# Patient Record
Sex: FEMALE | Race: WHITE | NOT HISPANIC OR LATINO | Employment: OTHER | URBAN - NONMETROPOLITAN AREA
[De-identification: names, ages, dates, MRNs, and addresses within clinical notes are randomized per-mention and may not be internally consistent; named-entity substitution may affect disease eponyms.]

---

## 2020-12-02 RX ORDER — LEVOTHYROXINE AND LIOTHYRONINE 9.5; 2.25 UG/1; UG/1
60 TABLET ORAL DAILY
COMMUNITY
End: 2021-04-06 | Stop reason: ALTCHOICE

## 2020-12-02 RX ORDER — FLUCONAZOLE 150 MG/1
150 TABLET ORAL ONCE
COMMUNITY
End: 2020-12-14

## 2020-12-02 RX ORDER — PREDNISONE 10 MG/1
10 TABLET ORAL TAKE AS DIRECTED
COMMUNITY
End: 2020-12-14

## 2020-12-02 RX ORDER — LEVOTHYROXINE AND LIOTHYRONINE 38; 9 UG/1; UG/1
60 TABLET ORAL DAILY
COMMUNITY

## 2020-12-02 RX ORDER — AMOXICILLIN 500 MG/1
1000 CAPSULE ORAL 3 TIMES DAILY
COMMUNITY
End: 2020-12-14

## 2020-12-02 RX ORDER — ESTRADIOL 0.5 MG/1
0.5 TABLET ORAL DAILY
COMMUNITY

## 2020-12-02 RX ORDER — CITALOPRAM 20 MG/1
20 TABLET ORAL DAILY
COMMUNITY
End: 2020-12-14

## 2020-12-02 RX ORDER — TRAZODONE HYDROCHLORIDE 100 MG/1
200 TABLET ORAL NIGHTLY
COMMUNITY
End: 2022-03-31

## 2020-12-02 RX ORDER — LORATADINE 10 MG/1
10 TABLET ORAL DAILY
COMMUNITY

## 2020-12-02 RX ORDER — NAPROXEN 500 MG/1
500 TABLET ORAL 2 TIMES DAILY WITH MEALS
COMMUNITY
End: 2020-12-14

## 2020-12-02 RX ORDER — BENZONATATE 100 MG/1
100 CAPSULE ORAL 3 TIMES DAILY PRN
COMMUNITY
End: 2020-12-14

## 2020-12-02 RX ORDER — ESOMEPRAZOLE MAGNESIUM 40 MG/1
40 CAPSULE, DELAYED RELEASE ORAL DAILY
COMMUNITY
End: 2020-12-14

## 2020-12-02 RX ORDER — CEPHALEXIN 500 MG/1
500 CAPSULE ORAL 3 TIMES DAILY
COMMUNITY
End: 2020-12-14

## 2020-12-02 RX ORDER — LOSARTAN POTASSIUM 100 MG/1
100 TABLET ORAL DAILY
COMMUNITY
End: 2020-12-14

## 2020-12-02 RX ORDER — FERROUS SULFATE 325(65) MG
325 TABLET ORAL
COMMUNITY
End: 2020-12-14

## 2020-12-02 RX ORDER — AZITHROMYCIN 250 MG/1
250 TABLET, FILM COATED ORAL TAKE AS DIRECTED
COMMUNITY
End: 2020-12-14

## 2020-12-02 RX ORDER — GLIPIZIDE 5 MG/1
5 TABLET, FILM COATED, EXTENDED RELEASE ORAL DAILY
COMMUNITY
End: 2020-12-14

## 2020-12-02 RX ORDER — GABAPENTIN 400 MG/1
400 CAPSULE ORAL 4 TIMES DAILY
COMMUNITY
End: 2023-01-13 | Stop reason: HOSPADM

## 2020-12-02 RX ORDER — VALACYCLOVIR HYDROCHLORIDE 1 G/1
1000 TABLET, FILM COATED ORAL 3 TIMES DAILY
COMMUNITY
End: 2020-12-14

## 2020-12-02 RX ORDER — MECLIZINE HYDROCHLORIDE 25 MG/1
TABLET ORAL
COMMUNITY
End: 2020-12-14

## 2020-12-02 RX ORDER — HYDROCHLOROTHIAZIDE 25 MG/1
25 TABLET ORAL DAILY
COMMUNITY

## 2020-12-02 RX ORDER — DOXYCYCLINE 100 MG/1
100 TABLET ORAL 2 TIMES DAILY
COMMUNITY
End: 2020-12-14

## 2020-12-14 ENCOUNTER — TELEPHONE (OUTPATIENT)
Dept: GASTROENTEROLOGY | Facility: CLINIC | Age: 70
End: 2020-12-14

## 2020-12-14 ENCOUNTER — RESULTS ENCOUNTER (OUTPATIENT)
Dept: GASTROENTEROLOGY | Facility: CLINIC | Age: 70
End: 2020-12-14

## 2020-12-14 ENCOUNTER — LAB (OUTPATIENT)
Dept: LAB | Facility: HOSPITAL | Age: 70
End: 2020-12-14

## 2020-12-14 ENCOUNTER — OFFICE VISIT (OUTPATIENT)
Dept: GASTROENTEROLOGY | Facility: CLINIC | Age: 70
End: 2020-12-14

## 2020-12-14 VITALS
DIASTOLIC BLOOD PRESSURE: 88 MMHG | RESPIRATION RATE: 18 BRPM | HEART RATE: 81 BPM | BODY MASS INDEX: 36.37 KG/M2 | WEIGHT: 213 LBS | OXYGEN SATURATION: 98 % | SYSTOLIC BLOOD PRESSURE: 130 MMHG | TEMPERATURE: 96.8 F | HEIGHT: 64 IN

## 2020-12-14 DIAGNOSIS — R19.7 DIARRHEA, UNSPECIFIED TYPE: Primary | ICD-10-CM

## 2020-12-14 DIAGNOSIS — R10.30 LOWER ABDOMINAL PAIN: Chronic | ICD-10-CM

## 2020-12-14 DIAGNOSIS — R13.14 PHARYNGOESOPHAGEAL DYSPHAGIA: Chronic | ICD-10-CM

## 2020-12-14 DIAGNOSIS — K21.9 GASTROESOPHAGEAL REFLUX DISEASE, UNSPECIFIED WHETHER ESOPHAGITIS PRESENT: Chronic | ICD-10-CM

## 2020-12-14 DIAGNOSIS — Z01.812 PRE-PROCEDURE LAB EXAM: Primary | ICD-10-CM

## 2020-12-14 DIAGNOSIS — R19.7 DIARRHEA, UNSPECIFIED TYPE: Chronic | ICD-10-CM

## 2020-12-14 DIAGNOSIS — R19.7 DIARRHEA, UNSPECIFIED TYPE: Primary | Chronic | ICD-10-CM

## 2020-12-14 DIAGNOSIS — Z86.010 PERSONAL HISTORY OF COLONIC POLYPS: Chronic | ICD-10-CM

## 2020-12-14 DIAGNOSIS — R63.4 WEIGHT LOSS: Chronic | ICD-10-CM

## 2020-12-14 PROBLEM — Z86.0100 PERSONAL HISTORY OF COLONIC POLYPS: Chronic | Status: ACTIVE | Noted: 2020-12-14

## 2020-12-14 PROBLEM — G47.33 OBSTRUCTIVE APNEA: Status: ACTIVE | Noted: 2020-12-14

## 2020-12-14 PROBLEM — E11.42 DIABETIC PERIPHERAL NEUROPATHY ASSOCIATED WITH TYPE 2 DIABETES MELLITUS (HCC): Status: ACTIVE | Noted: 2020-12-14

## 2020-12-14 PROBLEM — R41.3 AMNESIA: Status: ACTIVE | Noted: 2020-12-14

## 2020-12-14 LAB
ALBUMIN SERPL-MCNC: 4.6 G/DL (ref 3.5–5.2)
ALBUMIN/GLOB SERPL: 1.6 G/DL
ALP SERPL-CCNC: 54 U/L (ref 39–117)
ALT SERPL W P-5'-P-CCNC: 16 U/L (ref 1–33)
ANION GAP SERPL CALCULATED.3IONS-SCNC: 10 MMOL/L (ref 5–15)
AST SERPL-CCNC: 21 U/L (ref 1–32)
BILIRUB SERPL-MCNC: 0.2 MG/DL (ref 0–1.2)
BUN SERPL-MCNC: 12 MG/DL (ref 8–23)
BUN/CREAT SERPL: 14.6 (ref 7–25)
CALCIUM SPEC-SCNC: 10 MG/DL (ref 8.6–10.5)
CHLORIDE SERPL-SCNC: 98 MMOL/L (ref 98–107)
CO2 SERPL-SCNC: 30 MMOL/L (ref 22–29)
CREAT SERPL-MCNC: 0.82 MG/DL (ref 0.57–1)
DEPRECATED RDW RBC AUTO: 38.8 FL (ref 37–54)
ERYTHROCYTE [DISTWIDTH] IN BLOOD BY AUTOMATED COUNT: 12.5 % (ref 12.3–15.4)
GFR SERPL CREATININE-BSD FRML MDRD: 69 ML/MIN/1.73
GLOBULIN UR ELPH-MCNC: 2.8 GM/DL
GLUCOSE SERPL-MCNC: 107 MG/DL (ref 65–99)
HCT VFR BLD AUTO: 35.4 % (ref 34–46.6)
HGB BLD-MCNC: 12 G/DL (ref 12–15.9)
IGA1 MFR SER: 49 MG/DL (ref 70–400)
MCH RBC QN AUTO: 29.2 PG (ref 26.6–33)
MCHC RBC AUTO-ENTMCNC: 33.9 G/DL (ref 31.5–35.7)
MCV RBC AUTO: 86.1 FL (ref 79–97)
PLATELET # BLD AUTO: 338 10*3/MM3 (ref 140–450)
PMV BLD AUTO: 9.2 FL (ref 6–12)
POTASSIUM SERPL-SCNC: 4.1 MMOL/L (ref 3.5–5.2)
PROT SERPL-MCNC: 7.4 G/DL (ref 6–8.5)
RBC # BLD AUTO: 4.11 10*6/MM3 (ref 3.77–5.28)
SODIUM SERPL-SCNC: 138 MMOL/L (ref 136–145)
WBC # BLD AUTO: 5.62 10*3/MM3 (ref 3.4–10.8)

## 2020-12-14 PROCEDURE — 99204 OFFICE O/P NEW MOD 45 MIN: CPT | Performed by: NURSE PRACTITIONER

## 2020-12-14 PROCEDURE — 82784 ASSAY IGA/IGD/IGG/IGM EACH: CPT

## 2020-12-14 PROCEDURE — 36415 COLL VENOUS BLD VENIPUNCTURE: CPT

## 2020-12-14 PROCEDURE — 83516 IMMUNOASSAY NONANTIBODY: CPT

## 2020-12-14 PROCEDURE — 85027 COMPLETE CBC AUTOMATED: CPT

## 2020-12-14 PROCEDURE — 80053 COMPREHEN METABOLIC PANEL: CPT

## 2020-12-14 RX ORDER — DICYCLOMINE HCL 20 MG
20 TABLET ORAL 3 TIMES DAILY PRN
Qty: 30 TABLET | Refills: 1 | Status: SHIPPED | OUTPATIENT
Start: 2020-12-14 | End: 2021-04-06

## 2020-12-14 RX ORDER — SODIUM, POTASSIUM,MAG SULFATES 17.5-3.13G
SOLUTION, RECONSTITUTED, ORAL ORAL
Qty: 2 BOTTLE | Refills: 0 | Status: SHIPPED | OUTPATIENT
Start: 2020-12-14 | End: 2021-01-15 | Stop reason: HOSPADM

## 2020-12-14 RX ORDER — SODIUM CHLORIDE 9 MG/ML
70 INJECTION, SOLUTION INTRAVENOUS CONTINUOUS PRN
Status: CANCELLED | OUTPATIENT
Start: 2021-01-15

## 2020-12-14 RX ORDER — FAMOTIDINE 20 MG/1
20 TABLET, FILM COATED ORAL 2 TIMES DAILY
Qty: 60 TABLET | Refills: 5 | Status: SHIPPED | OUTPATIENT
Start: 2020-12-14 | End: 2022-03-31 | Stop reason: ALTCHOICE

## 2020-12-14 RX ORDER — FAMOTIDINE 20 MG/1
20 TABLET, FILM COATED ORAL NIGHTLY PRN
COMMUNITY
End: 2020-12-14 | Stop reason: DRUGHIGH

## 2020-12-14 RX ORDER — OMEPRAZOLE 20 MG/1
20 TABLET, DELAYED RELEASE ORAL DAILY
COMMUNITY
End: 2020-12-14 | Stop reason: ALTCHOICE

## 2020-12-14 NOTE — PATIENT INSTRUCTIONS
1. Antireflux measures: Avoid fried, fatty foods, alcohol, chocolate, coffee, tea,  soft drinks, peppermint and spearmint, spicy foods, tomatoes and tomato based foods, onion based foods, and smoking. Other antireflux measures include weight reduction if overweight, avoiding tight clothing around the abdomen, elevating the head of the bed 6 inches with blocks under the head board, and don't drink or eat before going to bed and avoid lying down immediately after meals.  2. Pepcid 20 mg 1 po twice a day.  3. Dietary instructions.  The patient should eat relatively soft diet, and should eat in upright position and chew well.  The patient should drink water after 2-3 bites and take medications with liberal amounts of water. Furthermore after eating and taking medications, the patient should remain in upright position for 5-10 minutes.  4. Labs  5. Stool studies  6. Low FODMAP diet  7. Bentyl 20 mg 1 po 3 times per day as needed for lower abdominal pain and diarrhea.   7. Possible EGD in the future if no improvement in swallowing.  8. Colonoscopy: Description of the procedure, risks, benefits, alternatives and options, including nonoperative options, were discussed with the patient in detail. The patient understands and wishes to proceed.  9. COVID-19 testing prior to procedure. The patient will need to self-quarantine after testing until the procedure. Instructions given to patient.

## 2020-12-14 NOTE — TELEPHONE ENCOUNTER
Patient called today. She states that the Suprep was going to be $47 and she can't afford that. She would like something different called in.

## 2020-12-14 NOTE — PROGRESS NOTES
"     New Patient Consult      Date: 2020   Patient Name: Harley Rodriges  MRN: [id number]  : 1950     Primary Care Provider: Bam German PA    Chief Complaint   Patient presents with   • Diarrhea     History of Present Illness: Harley Rodriges is a 70 y.o. female who is here today to establish care with Gastroenterology for diarrhea.     The patient has a history of diarrhea for many yers.  Since 2020, diarrhea worsened. She has 3-5 loose to watery bowel movements per day. The patient has not traveled, taken antibiotics or been around anyone ill. No rectal bleeding. She has lower abdominal pain associated with bowel movements. No history of nausea or vomiting.     She has lost about 12 pounds in the past 2 months unintentionally. She has changed her eating habits due to diarrhea. No loss of appetite. She has a history of heartburn that is well controlled with Pepcid at night. She has occasional difficulty swallowing solids and liquids. It feels food and drinks will become \"stuck\" in the back of her throat that do not want to go down about once per month.      The patient's last colonoscopy and EGD were in 2016 by Dr. Cason in [place], [place]. She had a few polyps removed, pathology unknown. There is no family history of GI malignancy or IBD.     Subjective      Past Medical History:   Diagnosis Date   • Colon polyp    • Diabetes mellitus (CMS/HCC)    • Diabetic polyneuropathy (CMS/HCC)    • Disease of thyroid gland    • Esophagitis    • Gastritis    • History of colon polyps    • Hyperlipidemia    • Hypertension    • Iron deficiency      Past Surgical History:   Procedure Laterality Date   • COLONOSCOPY  2016   • COLONOSCOPY W/ BIOPSIES  2016    Dr. Cason   • ENDOSCOPY  2016    Dr. Cason   • UPPER GASTROINTESTINAL ENDOSCOPY  2016     Family History   Problem Relation Age of Onset   • Heart disease Mother    • Skin cancer Mother    • Heart disease Father    • Arthritis Father    • " Polymyalgia rheumatica Father    • Skin cancer Father    • Colon cancer Neg Hx    • Inflammatory bowel disease Neg Hx      Social History     Socioeconomic History   • Marital status:      Spouse name: Not on file   • Number of children: Not on file   • Years of education: Not on file   • Highest education level: Not on file   Tobacco Use   • Smoking status: Never Smoker   • Smokeless tobacco: Never Used   Substance and Sexual Activity   • Alcohol use: Never     Frequency: Never   • Drug use: Never   • Sexual activity: Defer       Current Outpatient Medications:   •  estradiol (ESTRACE) 0.5 MG tablet, Take 0.5 mg by mouth Daily., Disp: , Rfl:   •  GABAPENTIN PO, Take 400 mg by mouth., Disp: , Rfl:   •  hydroCHLOROthiazide (HYDRODIURIL) 25 MG tablet, Take 25 mg by mouth Daily., Disp: , Rfl:   •  loratadine (CLARITIN) 10 MG tablet, Take 10 mg by mouth Daily., Disp: , Rfl:   •  metFORMIN (GLUCOPHAGE) 1000 MG tablet, Take 1,000 mg by mouth 2 (Two) Times a Day With Meals., Disp: , Rfl:   •  thyroid (ARMOUR) 15 MG tablet, Take 15 mg by mouth Daily. Total 60 mg per day, Disp: , Rfl:   •  Thyroid 60 MG PO tablet, Take 60 mg by mouth Daily. Total 75 mg per day, Disp: , Rfl:   •  traZODone (DESYREL) 100 MG tablet, Take 200 mg by mouth Every Night., Disp: , Rfl:   •  dicyclomine (Bentyl) 20 MG tablet, Take 1 tablet by mouth 3 (Three) Times a Day As Needed (lower abdominal pain and diarrhea)., Disp: 30 tablet, Rfl: 1  •  famotidine (PEPCID) 20 MG tablet, Take 1 tablet by mouth 2 (Two) Times a Day., Disp: 60 tablet, Rfl: 5  •  sodium-potassium-magnesium sulfates (Suprep Bowel Prep Kit) 17.5-3.13-1.6 GM/177ML solution oral solution, Use as directed for colonoscopy prep. Patient has instructions., Disp: 2 bottle, Rfl: 0    Allergies   Allergen Reactions   • Prednisone Irritability        Review of Systems   Constitutional: Positive for unexpected weight loss. Negative for appetite change.   HENT: Negative for trouble  swallowing.    Eyes: Negative for blurred vision.   Respiratory: Negative for choking and chest tightness.    Cardiovascular: Negative for leg swelling.   Gastrointestinal: Positive for abdominal pain, diarrhea and GERD. Negative for abdominal distention, anal bleeding, blood in stool, constipation, nausea, rectal pain, vomiting and indigestion.   Endocrine: Negative for polyphagia.   Genitourinary: Negative for hematuria.   Musculoskeletal: Negative for arthralgias and myalgias.   Skin: Negative for rash.   Allergic/Immunologic: Negative for food allergies.   Neurological: Negative for dizziness, syncope and confusion.   Hematological: Does not bruise/bleed easily.   Psychiatric/Behavioral: Negative for depressed mood.      The following portions of the patient's history were reviewed and updated as appropriate: allergies, current medications, past family history, past medical history, past social history, past surgical history and problem list.    Objective     Physical Exam  Vitals signs and nursing note reviewed.   Constitutional:       General: She is awake. She is not in acute distress.     Appearance: Normal appearance. She is well-developed. She is not diaphoretic.   HENT:      Head: Normocephalic and atraumatic.      Right Ear: Hearing and external ear normal.      Left Ear: Hearing and external ear normal.      Nose: Nose normal.      Mouth/Throat:      Lips: Pink.      Mouth: Mucous membranes are not pale, not dry and not cyanotic. No oral lesions.      Pharynx: No oropharyngeal exudate.   Eyes:      General: Lids are normal.         Right eye: No discharge.         Left eye: No discharge.      Conjunctiva/sclera: Conjunctivae normal.   Neck:      Musculoskeletal: Neck supple. No edema.      Thyroid: No thyroid mass or thyromegaly.      Vascular: No JVD.      Trachea: Trachea normal.   Cardiovascular:      Rate and Rhythm: Normal rate and regular rhythm.      Heart sounds: Normal heart sounds and S2  "normal. No murmur. No friction rub. No gallop. No S3 sounds.    Pulmonary:      Effort: Pulmonary effort is normal. No respiratory distress.      Breath sounds: Normal breath sounds.   Chest:      Chest wall: No tenderness.   Abdominal:      General: Bowel sounds are normal. There is no distension.      Palpations: Abdomen is not rigid. There is no hepatomegaly, splenomegaly or mass.      Tenderness: There is no abdominal tenderness. There is no guarding or rebound.      Hernia: No hernia is present.   Musculoskeletal: Normal range of motion.   Lymphadenopathy:      Cervical: No cervical adenopathy.      Upper Body:      Left upper body: No supraclavicular adenopathy.   Skin:     General: Skin is warm and dry.      Coloration: Skin is not pale.      Findings: No rash.      Nails: There is no clubbing.     Neurological:      Mental Status: She is alert and oriented to person, place, and time.      Cranial Nerves: No cranial nerve deficit.      Sensory: No sensory deficit.   Psychiatric:         Mood and Affect: Mood normal.         Speech: Speech normal.         Behavior: Behavior is cooperative.       Vital Signs:   Vitals:    12/14/20 0820   BP: 130/88   Pulse: 81   Resp: 18   Temp: 96.8 °F (36 °C)   TempSrc: Temporal   SpO2: 98%   Weight: 96.6 kg (213 lb)   Height: 162.6 cm (64\")     Results Review:   I have reviewed the patient's new clinical and imaging results.    No visits with results within 90 Day(s) from this visit.   Latest known visit with results is:   No results found for any previous visit.      No radiology results for the last 90 days.     Dated 10/13/2020 albumin 4.8 total bilirubin 0.2 alkaline phosphatase 54 AST 15 ALT 13 hemoglobin 12.7 hematocrit 40.1 platelet count 314 lipase 31, TSH 1.1, magnesium 1.36, SARS COV2 total antibody: negative    Assessment / Plan      1. Diarrhea, unspecified type  2. Lower abdominal pain  3. Weight loss  Longstanding history of diarrhea with lower abdominal pain " that has gradually worsened since October 2020.  The patient has 3-5 loose to watery bowel movements per day.  She has not traveled, taken antibiotics or been around anyone ill.  No history of rectal bleeding.  She has lost about 12 pounds in the past 2 months unintentionally.  Most recent labs with TSH normal.  Basic labs, celiac panel  Stool studies  Colonoscopy to rule out IBD.    - CBC (No Diff); Future  - Comprehensive Metabolic Panel; Future  - IgA; Future  - Tissue Transglutaminase, IgA; Future  - Gastrointestinal Panel, PCR - Stool, Per Rectum; Future  - Clostridium Difficile Toxin - Stool, Per Rectum; Future  - Calprotectin, Fecal - Stool, Per Rectum; Future  - Case Request; Standing  - Implement Anesthesia Orders Day of Procedure; Standing  - Obtain Informed Consent; Standing  - Verify Bowel Prep Was Successful; Standing  - Oxygen Therapy- Nasal Cannula; 2 LPM; Titrate for SPO2: equal to or greater than, 90%; Standing  - POC Glucose Once; Standing  - sodium chloride 0.9 % infusion  - Case Request  - dicyclomine (Bentyl) 20 MG tablet; Take 1 tablet by mouth 3 (Three) Times a Day As Needed (lower abdominal pain and diarrhea).  Dispense: 30 tablet; Refill: 1  - sodium-potassium-magnesium sulfates (Suprep Bowel Prep Kit) 17.5-3.13-1.6 GM/177ML solution oral solution; Use as directed for colonoscopy prep. Patient has instructions.  Dispense: 2 bottle; Refill: 0    4. Personal history of colonic polyps  Last colonoscopy was in 2016, pathology unknown.   Colonoscopy for surveillance.    5. Gastroesophageal reflux disease, unspecified whether esophagitis present  Longstanding history of reflux, reasonable control with Pepcid.  Anti-reflux measures.  Pepcid 20 mg twice a day.    - famotidine (PEPCID) 20 MG tablet; Take 1 tablet by mouth 2 (Two) Times a Day.  Dispense: 60 tablet; Refill: 5    6. Pharyngoesophageal dysphagia  Long standing history of difficulty swallowing solids and liquids that occurs about once per  month. Symptoms are not progressive. EGD in 2016 normal per patient.  Will monitor for now. EGD in the future if worsening of symptoms.    Patient Instructions   1. Antireflux measures: Avoid fried, fatty foods, alcohol, chocolate, coffee, tea,  soft drinks, peppermint and spearmint, spicy foods, tomatoes and tomato based foods, onion based foods, and smoking. Other antireflux measures include weight reduction if overweight, avoiding tight clothing around the abdomen, elevating the head of the bed 6 inches with blocks under the head board, and don't drink or eat before going to bed and avoid lying down immediately after meals.  2. Pepcid 20 mg 1 po twice a day.  3. Dietary instructions.  The patient should eat relatively soft diet, and should eat in upright position and chew well.  The patient should drink water after 2-3 bites and take medications with liberal amounts of water. Furthermore after eating and taking medications, the patient should remain in upright position for 5-10 minutes.  4. Labs  5. Stool studies  6. Low FODMAP diet  7. Bentyl 20 mg 1 po 3 times per day as needed for lower abdominal pain and diarrhea.   7. Possible EGD in the future if no improvement in swallowing.  8. Colonoscopy: Description of the procedure, risks, benefits, alternatives and options, including nonoperative options, were discussed with the patient in detail. The patient understands and wishes to proceed.  9. COVID-19 testing prior to procedure. The patient will need to self-quarantine after testing until the procedure. Instructions given to patient.     Tito Migule, APRN  12/14/2020    Please note that portions of this note may have been completed with a voice recognition program. Efforts were made to edit the dictations, but occasionally words are mistranscribed.

## 2020-12-14 NOTE — PROGRESS NOTES
New Patient Consult      Date: 2020   Patient Name: Harley Rodriges  MRN: [id number]  : 1950     Referring Physician: Bam German PA    Chief Complaint   Patient presents with   • Diarrhea     History of Present Illness: Harley Rodriges is a 70 y.o. female who is here today to establish care with Gastroenterology for diarrhea.             Subjective      Past Medical History:   Diagnosis Date   • Diabetes mellitus (CMS/HCC)    • Diabetic polyneuropathy (CMS/HCC)    • Disease of thyroid gland    • Esophagitis    • Gastritis    • History of colon polyps    • Hyperlipidemia    • Hypertension    • Iron deficiency      Past Surgical History:   Procedure Laterality Date   • COLONOSCOPY W/ BIOPSIES      Dr. Cason   • ENDOSCOPY      Dr. Cason     Family History   Problem Relation Age of Onset   • Heart disease Mother    • Skin cancer Mother    • Heart disease Father    • Arthritis Father    • Polymyalgia rheumatica Father    • Skin cancer Father    • Colon cancer Neg Hx      Social History     Socioeconomic History   • Marital status:      Spouse name: Not on file   • Number of children: Not on file   • Years of education: Not on file   • Highest education level: Not on file   Tobacco Use   • Smoking status: Never Smoker   • Smokeless tobacco: Never Used   Substance and Sexual Activity   • Alcohol use: Never     Frequency: Never   • Drug use: Never   • Sexual activity: Defer       Current Outpatient Medications:   •  estradiol (ESTRACE) 0.5 MG tablet, Take 0.5 mg by mouth Daily., Disp: , Rfl:   •  GABAPENTIN PO, Take 400 mg by mouth., Disp: , Rfl:   •  hydroCHLOROthiazide (HYDRODIURIL) 25 MG tablet, Take 25 mg by mouth Daily., Disp: , Rfl:   •  loratadine (CLARITIN) 10 MG tablet, Take 10 mg by mouth Daily., Disp: , Rfl:   •  metFORMIN (GLUCOPHAGE) 1000 MG tablet, Take 1,000 mg by mouth 2 (Two) Times a Day With Meals., Disp: , Rfl:   •  omeprazole OTC (Acid Reducer) 20 MG EC tablet,  "Take 20 mg by mouth Daily., Disp: , Rfl:   •  thyroid (ARMOUR) 15 MG tablet, Take 15 mg by mouth Daily. Total 60 mg per day, Disp: , Rfl:   •  Thyroid 60 MG PO tablet, Take 60 mg by mouth Daily. Total 75 mg per day, Disp: , Rfl:   •  traZODone (DESYREL) 100 MG tablet, Take 200 mg by mouth Every Night., Disp: , Rfl:     No Known Allergies    Review of Systems:   Review of Systems   Constitutional: Positive for unexpected weight loss. Negative for appetite change.   HENT: Positive for trouble swallowing.    Eyes: Negative for blurred vision.   Respiratory: Negative for choking and chest tightness.    Cardiovascular: Negative for leg swelling.   Gastrointestinal: Positive for diarrhea and GERD. Negative for abdominal distention, abdominal pain, anal bleeding, blood in stool, constipation, nausea, rectal pain, vomiting and indigestion.   Endocrine: Negative for polyphagia.   Genitourinary: Negative for hematuria.   Musculoskeletal: Negative for arthralgias and myalgias.   Skin: Negative for rash.   Allergic/Immunologic: Negative for food allergies.   Neurological: Negative for dizziness, syncope and confusion.   Hematological: Does not bruise/bleed easily.   Psychiatric/Behavioral: Negative for depressed mood.     The following portions of the patient's history were reviewed and updated as appropriate: allergies, current medications, past family history, past medical history, past social history, past surgical history and problem list.    Objective     Physical Exam:  Vital Signs:   Vitals:    12/14/20 0820   BP: 130/88   Pulse: 81   Resp: 18   Temp: 96.8 °F (36 °C)   TempSrc: Temporal   SpO2: 98%   Weight: 96.6 kg (213 lb)   Height: 162.6 cm (64\")     Physical Exam  Vitals signs and nursing note reviewed.   Constitutional:       General: She is awake. She is not in acute distress.     Appearance: Normal appearance. She is well-developed. She is not diaphoretic.   HENT:      Head: Normocephalic and atraumatic.      " Right Ear: Hearing and external ear normal.      Left Ear: Hearing and external ear normal.      Nose: Nose normal.      Mouth/Throat:      Lips: Pink.      Mouth: Mucous membranes are not pale, not dry and not cyanotic. No oral lesions.      Pharynx: No oropharyngeal exudate.   Eyes:      General: Lids are normal.         Right eye: No discharge.         Left eye: No discharge.      Conjunctiva/sclera: Conjunctivae normal.   Neck:      Musculoskeletal: Neck supple. No edema.      Thyroid: No thyroid mass or thyromegaly.      Vascular: No JVD.      Trachea: Trachea normal.   Cardiovascular:      Rate and Rhythm: Normal rate and regular rhythm.      Heart sounds: Normal heart sounds and S2 normal. No murmur. No friction rub. No gallop. No S3 sounds.    Pulmonary:      Effort: Pulmonary effort is normal. No respiratory distress.      Breath sounds: Normal breath sounds.   Chest:      Chest wall: No tenderness.   Abdominal:      General: Bowel sounds are normal. There is no distension.      Palpations: Abdomen is not rigid. There is no hepatomegaly, splenomegaly or mass.      Tenderness: There is no abdominal tenderness. There is no guarding or rebound.      Hernia: No hernia is present.   Musculoskeletal: Normal range of motion.   Lymphadenopathy:      Cervical: No cervical adenopathy.      Upper Body:      Left upper body: No supraclavicular adenopathy.   Skin:     General: Skin is warm and dry.      Coloration: Skin is not pale.      Findings: No rash.      Nails: There is no clubbing.     Neurological:      Mental Status: She is alert and oriented to person, place, and time.      Cranial Nerves: No cranial nerve deficit.      Sensory: No sensory deficit.   Psychiatric:         Mood and Affect: Mood normal.         Speech: Speech normal.         Behavior: Behavior is cooperative.       Results Review:   I have reviewed the patient's new clinical and imaging results and agree with the interpretation.     No visits  with results within 90 Day(s) from this visit.   Latest known visit with results is:   No results found for any previous visit.      No radiology results for the last 90 days.    Dated 10/13/2020 albumin 4.8 total bilirubin 0.2 alkaline phosphatase 54 AST 15 ALT 13 hemoglobin 12.7 hematocrit 40.1 platelet count 314 lipase 31, TSH 1.1, magnesium 1.36, SARS COV2 total antibody: negative    Assessment / Plan      Assessment & Plan:  1. Diarrhea, unspecified type  ***    2. Weight loss  ***    3. Gastroesophageal reflux disease, unspecified whether esophagitis present  ***          Yuridia Vinson MD  [facility]  12/14/2020  08:36 EST    Please note that portions of this note may have been completed with a voice recognition program.

## 2020-12-15 LAB — TTG IGA SER-ACNC: <2 U/ML (ref 0–3)

## 2020-12-15 RX ORDER — POLYETHYLENE GLYCOL 1450
POWDER (GRAM) MISCELLANEOUS
Qty: 238 G | Refills: 0 | Status: SHIPPED | OUTPATIENT
Start: 2020-12-15 | End: 2021-01-15 | Stop reason: HOSPADM

## 2020-12-15 RX ORDER — BISACODYL 5 MG/1
TABLET, DELAYED RELEASE ORAL
Qty: 4 TABLET | Refills: 0 | Status: SHIPPED | OUTPATIENT
Start: 2020-12-15 | End: 2021-01-15 | Stop reason: HOSPADM

## 2021-01-04 LAB
ADV 40+41 DNA STL QL NAA+NON-PROBE: NOT DETECTED
ASTRO TYP 1-8 RNA STL QL NAA+NON-PROBE: NOT DETECTED
C CAYETANENSIS DNA STL QL NAA+NON-PROBE: NOT DETECTED
CAMPY SP DNA.DIARRHEA STL QL NAA+PROBE: NOT DETECTED
CRYPTOSP STL CULT: NOT DETECTED
E HISTOLYT AG STL-ACNC: NOT DETECTED
EAEC PAA PLAS AGGR+AATA ST NAA+NON-PRB: NOT DETECTED
EC STX1 + STX2 GENES STL NAA+PROBE: NOT DETECTED
EPEC EAE GENE STL QL NAA+NON-PROBE: NOT DETECTED
ETEC LTA+ST1A+ST1B TOX ST NAA+NON-PROBE: NOT DETECTED
G LAMBLIA DNA SPEC QL NAA+PROBE: NOT DETECTED
NOROVIRUS GI+II RNA STL QL NAA+NON-PROBE: NOT DETECTED
P SHIGELLOIDES DNA STL QL NAA+PROBE: NOT DETECTED
RV RNA STL NAA+PROBE: NOT DETECTED
SALMONELLA DNA SPEC QL NAA+PROBE: NOT DETECTED
SAPO I+II+IV+V RNA STL QL NAA+NON-PROBE: NOT DETECTED
SHIGELLA SP+EIEC IPAH STL QL NAA+PROBE: NOT DETECTED
V CHOLERAE DNA SPEC QL NAA+PROBE: NOT DETECTED
VIBRIO DNA SPEC NAA+PROBE: NOT DETECTED
YERSINIA STL CULT: NOT DETECTED

## 2021-01-04 PROCEDURE — 0097U HC BIOFIRE FILMARRAY GI PANEL: CPT | Performed by: NURSE PRACTITIONER

## 2021-01-04 PROCEDURE — 83993 ASSAY FOR CALPROTECTIN FECAL: CPT | Performed by: NURSE PRACTITIONER

## 2021-01-06 LAB — CALPROTECTIN STL-MCNT: 32 UG/G (ref 0–120)

## 2021-01-12 NOTE — PRE-PROCEDURE INSTRUCTIONS

## 2021-01-13 ENCOUNTER — LAB (OUTPATIENT)
Dept: LAB | Facility: HOSPITAL | Age: 71
End: 2021-01-13

## 2021-01-13 DIAGNOSIS — Z01.812 PRE-PROCEDURE LAB EXAM: ICD-10-CM

## 2021-01-13 LAB — SARS-COV-2 RNA RESP QL NAA+PROBE: NOT DETECTED

## 2021-01-13 PROCEDURE — U0004 COV-19 TEST NON-CDC HGH THRU: HCPCS

## 2021-01-13 PROCEDURE — C9803 HOPD COVID-19 SPEC COLLECT: HCPCS

## 2021-01-15 ENCOUNTER — ANESTHESIA (OUTPATIENT)
Dept: GASTROENTEROLOGY | Facility: HOSPITAL | Age: 71
End: 2021-01-15

## 2021-01-15 ENCOUNTER — ANESTHESIA EVENT (OUTPATIENT)
Dept: GASTROENTEROLOGY | Facility: HOSPITAL | Age: 71
End: 2021-01-15

## 2021-01-15 ENCOUNTER — HOSPITAL ENCOUNTER (OUTPATIENT)
Facility: HOSPITAL | Age: 71
Setting detail: HOSPITAL OUTPATIENT SURGERY
Discharge: HOME | End: 2021-01-15
Attending: INTERNAL MEDICINE | Admitting: INTERNAL MEDICINE

## 2021-01-15 VITALS
WEIGHT: 203 LBS | BODY MASS INDEX: 34.66 KG/M2 | RESPIRATION RATE: 16 BRPM | DIASTOLIC BLOOD PRESSURE: 80 MMHG | SYSTOLIC BLOOD PRESSURE: 141 MMHG | TEMPERATURE: 97.2 F | HEART RATE: 55 BPM | HEIGHT: 64 IN | OXYGEN SATURATION: 99 %

## 2021-01-15 DIAGNOSIS — R19.7 DIARRHEA, UNSPECIFIED TYPE: ICD-10-CM

## 2021-01-15 LAB — GLUCOSE BLDC GLUCOMTR-MCNC: 130 MG/DL (ref 70–130)

## 2021-01-15 PROCEDURE — 25010000002 PROPOFOL 200 MG/20ML EMULSION: Performed by: NURSE ANESTHETIST, CERTIFIED REGISTERED

## 2021-01-15 PROCEDURE — 45385 COLONOSCOPY W/LESION REMOVAL: CPT | Performed by: INTERNAL MEDICINE

## 2021-01-15 PROCEDURE — 82962 GLUCOSE BLOOD TEST: CPT

## 2021-01-15 PROCEDURE — 45380 COLONOSCOPY AND BIOPSY: CPT | Performed by: INTERNAL MEDICINE

## 2021-01-15 RX ORDER — LIDOCAINE HYDROCHLORIDE 20 MG/ML
INJECTION, SOLUTION INTRAVENOUS AS NEEDED
Status: DISCONTINUED | OUTPATIENT
Start: 2021-01-15 | End: 2021-01-15 | Stop reason: SURG

## 2021-01-15 RX ORDER — PROPOFOL 10 MG/ML
INJECTION, EMULSION INTRAVENOUS AS NEEDED
Status: DISCONTINUED | OUTPATIENT
Start: 2021-01-15 | End: 2021-01-15 | Stop reason: SURG

## 2021-01-15 RX ORDER — SODIUM CHLORIDE 9 MG/ML
70 INJECTION, SOLUTION INTRAVENOUS CONTINUOUS PRN
Status: DISCONTINUED | OUTPATIENT
Start: 2021-01-15 | End: 2021-01-15 | Stop reason: HOSPADM

## 2021-01-15 RX ADMIN — PROPOFOL 50 MG: 10 INJECTION, EMULSION INTRAVENOUS at 12:45

## 2021-01-15 RX ADMIN — PROPOFOL 50 MG: 10 INJECTION, EMULSION INTRAVENOUS at 12:52

## 2021-01-15 RX ADMIN — PROPOFOL 50 MG: 10 INJECTION, EMULSION INTRAVENOUS at 12:37

## 2021-01-15 RX ADMIN — PROPOFOL 50 MG: 10 INJECTION, EMULSION INTRAVENOUS at 12:32

## 2021-01-15 RX ADMIN — SODIUM CHLORIDE 70 ML/HR: 9 INJECTION, SOLUTION INTRAVENOUS at 10:19

## 2021-01-15 RX ADMIN — LIDOCAINE HYDROCHLORIDE 60 MG: 20 INJECTION, SOLUTION INTRAVENOUS at 12:32

## 2021-01-15 RX ADMIN — PROPOFOL 50 MG: 10 INJECTION, EMULSION INTRAVENOUS at 12:55

## 2021-01-15 RX ADMIN — PROPOFOL 50 MG: 10 INJECTION, EMULSION INTRAVENOUS at 12:49

## 2021-01-15 RX ADMIN — PROPOFOL 50 MG: 10 INJECTION, EMULSION INTRAVENOUS at 12:40

## 2021-01-15 NOTE — H&P
[facility]  HISTORY AND PHYSICAL    Patient Name: Harley Rodriges  : 1950  MRN: [id number]    Chief Complaint:   For surveillance colonoscopy    History Of Presenting Illness:    History of colon polyp 2016  Diarrhea,   Wt loss  Lower abdominal pain    Past Medical History:   Diagnosis Date   • Colon polyp 2016   • Diabetes mellitus (CMS/HCC)    • Diabetic polyneuropathy (CMS/HCC)    • Disease of thyroid gland    • Esophagitis    • Fibromyalgia    • Gastritis    • History of colon polyps    • Hyperlipidemia    • Hypertension    • Iron deficiency    • Lyme disease        Past Surgical History:   Procedure Laterality Date   • APPENDECTOMY         • BREAST LUMPECTOMY WITH SENTINEL NODE BIOPSY AND AXILLARY NODE DISSECTION         • CARDIAC CATHETERIZATION         • COLONOSCOPY     • COLONOSCOPY W/ BIOPSIES      Dr. Cason   • ENDOSCOPY      Dr. Cason   • HYSTERECTOMY         • TONSILLECTOMY       at age 10   • UPPER GASTROINTESTINAL ENDOSCOPY         Social History     Socioeconomic History   • Marital status:      Spouse name: Not on file   • Number of children: Not on file   • Years of education: Not on file   • Highest education level: Not on file   Tobacco Use   • Smoking status: Never Smoker   • Smokeless tobacco: Never Used   Substance and Sexual Activity   • Alcohol use: Never     Frequency: Never   • Drug use: Never   • Sexual activity: Defer       Family History   Problem Relation Age of Onset   • Heart disease Mother    • Skin cancer Mother    • Heart disease Father    • Arthritis Father    • Polymyalgia rheumatica Father    • Skin cancer Father    • Colon cancer Neg Hx    • Inflammatory bowel disease Neg Hx        Prior to Admission Medications:  Medications Prior to Admission   Medication Sig Dispense Refill Last Dose   • bisacodyl (DULCOLAX) 5 MG EC tablet Take as directed for colon prep 4 tablet 0 2021 at Unknown time   • dicyclomine  (Bentyl) 20 MG tablet Take 1 tablet by mouth 3 (Three) Times a Day As Needed (lower abdominal pain and diarrhea). (Patient taking differently: Take 20 mg by mouth 3 (Three) Times a Day As Needed (lower abdominal pain and diarrhea). Takes as needed for abdominal pain) 30 tablet 1 1/14/2021 at Unknown time   • estradiol (ESTRACE) 0.5 MG tablet Take 0.5 mg by mouth Daily.   1/14/2021 at Unknown time   • famotidine (PEPCID) 20 MG tablet Take 1 tablet by mouth 2 (Two) Times a Day. 60 tablet 5 1/15/2021 at Unknown time   • GABAPENTIN PO Take 400 mg by mouth 4 (Four) Times a Day.   1/15/2021 at Unknown time   • hydroCHLOROthiazide (HYDRODIURIL) 25 MG tablet Take 25 mg by mouth Daily.   1/15/2021 at Unknown time   • loratadine (CLARITIN) 10 MG tablet Take 10 mg by mouth Daily.   1/14/2021 at Unknown time   • metFORMIN (GLUCOPHAGE) 1000 MG tablet Take 1,000 mg by mouth 2 (Two) Times a Day With Meals.   1/14/2021 at Unknown time   • Polyethylene Glycol powder Take as directed for colon prep. 238 g 0 1/14/2021 at Unknown time   • sodium-potassium-magnesium sulfates (Suprep Bowel Prep Kit) 17.5-3.13-1.6 GM/177ML solution oral solution Use as directed for colonoscopy prep. Patient has instructions. 2 bottle 0 1/14/2021 at Unknown time   • thyroid (ARMOUR) 15 MG tablet Take 60 mg by mouth Daily. Total 60 mg per day   1/15/2021 at Unknown time   • traZODone (DESYREL) 100 MG tablet Take 200 mg by mouth Every Night.   1/14/2021 at Unknown time   • Thyroid 60 MG PO tablet Take 60 mg by mouth Daily. Total 75 mg per day          Allergies:  Allergies   Allergen Reactions   • Prednisone Irritability        Vitals: Temp:  [97.8 °F (36.6 °C)] 97.8 °F (36.6 °C)  Heart Rate:  [70] 70  Resp:  [18] 18  BP: (155)/(97) 155/97    Review Of Systems:  Constitutional:  Negative for chills, fever, and unexpected weight change.  Respiratory:  Negative for cough, chest tightness, shortness of breath, and wheezing.  Cardiovascular:  Negative for chest  pain, palpitations, and leg swelling.  Gastrointestinal:  Negative for abdominal distention, abdominal pain, Nausea, vomiting.  Neurological:  Negative for Weakness, numbness, and headaches.     Physical Exam:    General Appearance:  Alert, cooperative, in no acute distress.   Lungs:   Clear to auscultation, respirations regular, even and                 unlabored.   Heart:  Regular rhythm and normal rate.   Abdomen:   Normal bowel sounds, no masses, no organomegaly. Soft, non-tender, non-distended   Neurologic: Alert and oriented x 3. Moves all four limbs equally       Plan: COLONOSCOPY (N/A)     Yuridia Vinson MD  1/15/2021

## 2021-01-15 NOTE — ANESTHESIA PREPROCEDURE EVALUATION
Anesthesia Evaluation     Patient summary reviewed and Nursing notes reviewed   no history of anesthetic complications:  NPO Solid Status: > 8 hours  NPO Liquid Status: > 8 hours           Airway   Mallampati: II  TM distance: >3 FB  Neck ROM: full  no difficulty expected  Dental - normal exam     Pulmonary - normal exam   (+) sleep apnea,   Cardiovascular - normal exam    Rhythm: regular  Rate: normal    (+) hypertension less than 2 medications, hyperlipidemia,       Neuro/Psych  (+) numbness,     GI/Hepatic/Renal/Endo    (+)  GERD,  diabetes mellitus type 2,     Musculoskeletal (-) negative ROS    Abdominal    Substance History - negative use     OB/GYN negative ob/gyn ROS         Other - negative ROS                       Anesthesia Plan    ASA 3     MAC   (Pt told that intravenous sedation will be used as the primary anesthetic along with local anesthesia if necessary. Every effort will be made to make sure the patient is comfortable.     The patient was told they may or may not have recall for the procedure. It was further explained that if the MAC was not adequate that a general anesthetic with either an LMA or endotracheal tube would be required.     Will proceed with the plan of care.)  intravenous induction     Anesthetic plan, all risks, benefits, and alternatives have been provided, discussed and informed consent has been obtained with: patient.

## 2021-01-15 NOTE — DISCHARGE INSTRUCTIONS
- Discharge patient to home (ambulatory).   - High fiber diet.   - Continue present medications.   - Avoid NSAID/ASA for 3 days  - Await pathology results.   - Repeat colonoscopy in 7 years for surveillance pending biopsy report.   - Return to GI office in 8 weeks.

## 2021-01-15 NOTE — ANESTHESIA POSTPROCEDURE EVALUATION
Patient: Harley Rodriges    Procedure Summary     Date: 01/15/21 Room / Location: [facility] ENDOSCOPY 2 / [facility] ENDOSCOPY    Anesthesia Start: 1225 Anesthesia Stop: 1303    Procedure: COLONOSCOPY WITH BIOSPIES AND POLYPECTOMY (N/A Anus) Diagnosis:       Diarrhea, unspecified type      (Diarrhea, unspecified type [R19.7])    Surgeon: Yuridia Vinson MD Provider: Tee Mantilla CRNA    Anesthesia Type: MAC ASA Status: 3          Anesthesia Type: MAC    Vitals  No vitals data found for the desired time range.          Post Anesthesia Care and Evaluation    Patient location during evaluation: bedside  Patient participation: complete - patient participated  Level of consciousness: awake and alert  Pain score: 0  Pain management: adequate  Airway patency: patent  Anesthetic complications: No anesthetic complications  PONV Status: none  Cardiovascular status: acceptable  Respiratory status: acceptable  Hydration status: acceptable

## 2021-01-21 LAB
LAB AP CASE REPORT: NORMAL
PATH REPORT.FINAL DX SPEC: NORMAL

## 2021-04-06 ENCOUNTER — OFFICE VISIT (OUTPATIENT)
Dept: GASTROENTEROLOGY | Facility: CLINIC | Age: 71
End: 2021-04-06

## 2021-04-06 VITALS
HEIGHT: 64 IN | DIASTOLIC BLOOD PRESSURE: 84 MMHG | HEART RATE: 68 BPM | TEMPERATURE: 98.4 F | SYSTOLIC BLOOD PRESSURE: 150 MMHG | WEIGHT: 204 LBS | RESPIRATION RATE: 16 BRPM | BODY MASS INDEX: 34.83 KG/M2

## 2021-04-06 DIAGNOSIS — K58.0 IRRITABLE BOWEL SYNDROME WITH DIARRHEA: Primary | Chronic | ICD-10-CM

## 2021-04-06 DIAGNOSIS — K21.9 GASTROESOPHAGEAL REFLUX DISEASE, UNSPECIFIED WHETHER ESOPHAGITIS PRESENT: Chronic | ICD-10-CM

## 2021-04-06 DIAGNOSIS — R63.4 WEIGHT LOSS: ICD-10-CM

## 2021-04-06 DIAGNOSIS — R13.14 PHARYNGOESOPHAGEAL DYSPHAGIA: Chronic | ICD-10-CM

## 2021-04-06 DIAGNOSIS — D12.6 ADENOMATOUS POLYP OF COLON, UNSPECIFIED PART OF COLON: ICD-10-CM

## 2021-04-06 PROBLEM — R19.7 DIARRHEA: Chronic | Status: RESOLVED | Noted: 2020-12-14 | Resolved: 2021-04-06

## 2021-04-06 PROBLEM — R10.30 LOWER ABDOMINAL PAIN: Chronic | Status: RESOLVED | Noted: 2020-12-14 | Resolved: 2021-04-06

## 2021-04-06 PROCEDURE — 99214 OFFICE O/P EST MOD 30 MIN: CPT | Performed by: NURSE PRACTITIONER

## 2021-04-06 RX ORDER — DICYCLOMINE HCL 20 MG
20 TABLET ORAL 3 TIMES DAILY PRN
COMMUNITY
End: 2022-03-31

## 2021-04-06 NOTE — PATIENT INSTRUCTIONS
Antireflux measures: Avoid fried, fatty foods, alcohol, chocolate, coffee, tea,  soft drinks, peppermint and spearmint, spicy foods, tomatoes and tomato based foods, onion based foods, and smoking. Other antireflux measures include weight reduction if overweight, avoiding tight clothing around the abdomen, elevating the head of the bed 6 inches with blocks under the head board, and don't drink or eat before going to bed and avoid lying down immediately after meals.  Pepcid 20 mg 1 po twice a day.  Bentyl 20 mg 1 po 3 times per day as needed for diarrhea.  Low FODMAP diet   Colonoscopy for surveillance in 7 years, 2028.  Follow up: The patient will call back    Low-FODMAP Eating Plan    FODMAPs (fermentable oligosaccharides, disaccharides, monosaccharides, and polyols) are sugars that are hard for some people to digest. A low-FODMAP eating plan may help some people who have bowel (intestinal) diseases to manage their symptoms.  This meal plan can be complicated to follow. Work with a diet and nutrition specialist (dietitian) to make a low-FODMAP eating plan that is right for you. A dietitian can make sure that you get enough nutrition from this diet.  What are tips for following this plan?  Reading food labels  · Check labels for hidden FODMAPs such as:  ? High-fructose syrup.  ? Honey.  ? Agave.  ? Natural fruit flavors.  ? Onion or garlic powder.  · Choose low-FODMAP foods that contain 3-4 grams of fiber per serving.  · Check food labels for serving sizes. Eat only one serving at a time to make sure FODMAP levels stay low.  Meal planning  · Follow a low-FODMAP eating plan for up to 6 weeks, or as told by your health care provider or dietitian.  · To follow the eating plan:  1. Eliminate high-FODMAP foods from your diet completely.  2. Gradually reintroduce high-FODMAP foods into your diet one at a time. Most people should wait a few days after introducing one high-FODMAP food before they introduce the next  high-FODMAP food. Your dietitian can recommend how quickly you may reintroduce foods.  3. Keep a daily record of what you eat and drink, and make note of any symptoms that you have after eating.  4. Review your daily record with a dietitian regularly. Your dietitian can help you identify which foods you can eat and which foods you should avoid.  General tips  · Drink enough fluid each day to keep your urine pale yellow.  · Avoid processed foods. These often have added sugar and may be high in FODMAPs.  · Avoid most dairy products, whole grains, and sweeteners.  · Work with a dietitian to make sure you get enough fiber in your diet.  Recommended foods  Grains  · Gluten-free grains, such as rice, oats, buckwheat, quinoa, corn, polenta, and millet. Gluten-free pasta, bread, or cereal. Rice noodles. Corn tortillas.  Vegetables  · Eggplant, zucchini, cucumber, peppers, green beans, [place] sprouts, bean sprouts, lettuce, arugula, kale, Swiss chard, spinach, zeferino greens, bok ramon, summer squash, potato, and tomato. Limited amounts of corn, carrot, and sweet potato. Green parts of scallions.  Fruits  · Bananas, oranges, vladimir, limes, blueberries, raspberries, strawberries, grapes, cantaloupe, honeydew melon, kiwi, papaya, passion fruit, and pineapple. Limited amounts of dried cranberries, banana chips, and shredded coconut.  Dairy  · Lactose-free milk, yogurt, and kefir. Lactose-free cottage cheese and ice cream. Non-dairy milks, such as almond, coconut, hemp, and rice milk. Yogurts made of non-dairy milks. Limited amounts of goat cheese, brie, mozzarella, parmesan, swiss, and other hard cheeses.  Meats and other protein foods  · Unseasoned beef, pork, poultry, or fish. Eggs. Porras. Tofu (firm) and tempeh. Limited amounts of nuts and seeds, such as almonds, walnuts, brazil nuts, pecans, peanuts, pumpkin seeds, bertin seeds, and sunflower seeds.  Fats and oils  · Butter-free spreads. Vegetable oils, such as olive,  "canola, and sunflower oil.  Seasoning and other foods  · Artificial sweeteners with names that do not end in \"ol\" such as aspartame, saccharine, and stevia. Maple syrup, white table sugar, raw sugar, brown sugar, and molasses. Fresh basil, coriander, parsley, rosemary, and thyme.  Beverages  · Water and mineral water. Sugar-sweetened soft drinks. Small amounts of orange juice or cranberry juice. Black and green tea. Most dry marcus. Coffee.  This may not be a complete list of low-FODMAP foods. Talk with your dietitian for more information.  Foods to avoid  Grains  · Wheat, including kamut, durum, and semolina. Barley and bulgur. Couscous. Wheat-based cereals. Wheat noodles, bread, crackers, and pastries.  Vegetables  · Chicory root, artichoke, asparagus, cabbage, snow peas, sugar snap peas, mushrooms, and cauliflower. Onions, garlic, leeks, and the white part of scallions.  Fruits  · Fresh, dried, and juiced forms of apple, pear, watermelon, peach, plum, cherries, apricots, blackberries, boysenberries, figs, nectarines, and lorie. Avocado.  Dairy  · Milk, yogurt, ice cream, and soft cheese. Cream and sour cream. Milk-based sauces. Custard.  Meats and other protein foods  · Fried or fatty meat. Sausage. Cashews and pistachios. Soybeans, baked beans, black beans, chickpeas, kidney beans, luana beans, navy beans, lentils, and split peas.  Seasoning and other foods  · Any sugar-free gum or candy. Foods that contain artificial sweeteners such as sorbitol, mannitol, isomalt, or xylitol. Foods that contain honey, high-fructose corn syrup, or agave. Bouillon, vegetable stock, beef stock, and chicken stock. Garlic and onion powder. Condiments made with onion, such as hummus, chutney, pickles, relish, salad dressing, and salsa. Tomato paste.  Beverages  · Chicory-based drinks. Coffee substitutes. Chamomile tea. Fennel tea. Sweet or fortified marcus such as port or jose. Diet soft drinks made with isomalt, mannitol, maltitol, " sorbitol, or xylitol. Apple, pear, and lorie juice. Juices with high-fructose corn syrup.  This may not be a complete list of high-FODMAP foods. Talk with your dietitian to discuss what dietary choices are best for you.   Summary  · A low-FODMAP eating plan is a short-term diet that eliminates FODMAPs from your diet to help ease symptoms of certain bowel diseases.  · The eating plan usually lasts up to 6 weeks. After that, high-FODMAP foods are restarted gradually, one at a time, so you can find out which may be causing symptoms.  · A low-FODMAP eating plan can be complicated. It is best to work with a dietitian who has experience with this type of plan.  This information is not intended to replace advice given to you by your health care provider. Make sure you discuss any questions you have with your health care provider.  Document Revised: 11/30/2018 Document Reviewed: 08/14/2018  [employer] Patient Education © 2021 [employer]

## 2021-04-06 NOTE — PROGRESS NOTES
"     Follow Up Note     Date: 2021   Patient Name: Harley Rodriges  MRN: [id number]  : 1950     Primary Care Provider: Bam German PA     Chief Complaint:    Chief Complaint   Patient presents with   • Follow-up     History of present illness:   2021  Harley Rodriges is a 70 y.o. female who is here today for follow up after colonoscopy.     She has been feeling better. Diarrhea and lower abdominal pain have improved. She has changed her diet and avoided sugars, which has helped. No rectal bleeding. She is intentionally trying to lose weight at this time and has lost 9 pounds since 2020 by changing her diet. Heartburn is well controlled.  She has not had any further episodes of difficulty swallowing.    Interval History:  2020  The patient has a history of diarrhea for many yers.  Since 2020, diarrhea worsened. She has 3-5 loose to watery bowel movements per day. The patient has not traveled, taken antibiotics or been around anyone ill. No rectal bleeding. She has lower abdominal pain associated with bowel movements. No history of nausea or vomiting.      She has lost about 12 pounds in the past 2 months unintentionally. She has changed her eating habits due to diarrhea. No loss of appetite. She has a history of heartburn that is well controlled with Pepcid at night. She has occasional difficulty swallowing solids and liquids. It feels food and drinks will become \"stuck\" in the back of her throat that do not want to go down about once per month.       The patient's last colonoscopy and EGD were in 2016 by Dr. Cason in [place], [place]. She had a few polyps removed, pathology unknown. There is no family history of GI malignancy or IBD.     Subjective      Past Medical History:   Diagnosis Date   • Colon polyp    • Diabetes mellitus (CMS/HCC)    • Diabetic polyneuropathy (CMS/HCC)    • Disease of thyroid gland    • Esophagitis    • Fibromyalgia    • Gastritis    • History " of colon polyps    • Hyperlipidemia    • Hypertension    • Iron deficiency    • Lyme disease      Past Surgical History:   Procedure Laterality Date   • APPENDECTOMY      1980   • BREAST LUMPECTOMY WITH SENTINEL NODE BIOPSY AND AXILLARY NODE DISSECTION      1981   • CARDIAC CATHETERIZATION      2015   • COLONOSCOPY  2016   • COLONOSCOPY N/A 1/15/2021    Procedure: COLONOSCOPY WITH BIOSPIES AND POLYPECTOMY;  Surgeon: Yuridia Vinson MD;  Location: [facility] ENDOSCOPY;  Service: Gastroenterology;  Laterality: N/A;   • COLONOSCOPY W/ BIOPSIES  2016    Dr. Cason   • ENDOSCOPY  2016    Dr. Cason   • HYSTERECTOMY      1980   • TONSILLECTOMY      1956 at age 10   • UPPER GASTROINTESTINAL ENDOSCOPY  2016     Family History   Problem Relation Age of Onset   • Heart disease Mother    • Skin cancer Mother    • Heart disease Father    • Arthritis Father    • Polymyalgia rheumatica Father    • Skin cancer Father    • Colon cancer Neg Hx    • Inflammatory bowel disease Neg Hx      Social History     Socioeconomic History   • Marital status:      Spouse name: Not on file   • Number of children: Not on file   • Years of education: Not on file   • Highest education level: Not on file   Tobacco Use   • Smoking status: Never Smoker   • Smokeless tobacco: Never Used   Vaping Use   • Vaping Use: Never used   Substance and Sexual Activity   • Alcohol use: Never   • Drug use: Never   • Sexual activity: Defer       Current Outpatient Medications:   •  dicyclomine (BENTYL) 20 MG tablet, Take 20 mg by mouth 3 (Three) Times a Day As Needed., Disp: , Rfl:   •  estradiol (ESTRACE) 0.5 MG tablet, Take 0.5 mg by mouth Daily., Disp: , Rfl:   •  famotidine (PEPCID) 20 MG tablet, Take 1 tablet by mouth 2 (Two) Times a Day., Disp: 60 tablet, Rfl: 5  •  GABAPENTIN PO, Take 400 mg by mouth 4 (Four) Times a Day., Disp: , Rfl:   •  hydroCHLOROthiazide (HYDRODIURIL) 25 MG tablet, Take 25 mg by mouth Daily., Disp: , Rfl:   •  loratadine  (CLARITIN) 10 MG tablet, Take 10 mg by mouth Daily., Disp: , Rfl:   •  metFORMIN (GLUCOPHAGE) 1000 MG tablet, Take 1,000 mg by mouth 2 (Two) Times a Day With Meals., Disp: , Rfl:   •  Thyroid 60 MG PO tablet, Take 60 mg by mouth Daily. Total 75 mg per day, Disp: , Rfl:   •  traZODone (DESYREL) 100 MG tablet, Take 200 mg by mouth Every Night., Disp: , Rfl:      Allergies   Allergen Reactions   • Prednisone Irritability     The following portions of the patient's history were reviewed and updated as appropriate: allergies, current medications, past family history, past medical history, past social history, past surgical history and problem list.  Objective     Physical Exam  Vitals and nursing note reviewed.   Constitutional:       General: She is awake. She is not in acute distress.     Appearance: Normal appearance. She is well-developed. She is not diaphoretic.   HENT:      Head: Normocephalic and atraumatic.      Right Ear: Hearing and external ear normal.      Left Ear: Hearing and external ear normal.      Nose: Nose normal.      Mouth/Throat:      Mouth: Mucous membranes are not pale, not dry and not cyanotic.   Eyes:      General: Lids are normal.         Right eye: No discharge.         Left eye: No discharge.      Conjunctiva/sclera: Conjunctivae normal.   Neck:      Thyroid: No thyroid mass or thyromegaly.      Vascular: No JVD.      Trachea: Trachea normal.   Cardiovascular:      Rate and Rhythm: Normal rate and regular rhythm.      Heart sounds: Normal heart sounds and S2 normal. No murmur heard.   No friction rub. No gallop. No S3 sounds.    Pulmonary:      Effort: Pulmonary effort is normal. No respiratory distress.      Breath sounds: Normal breath sounds.   Chest:      Chest wall: No tenderness.   Abdominal:      General: Bowel sounds are normal. There is no distension.      Palpations: Abdomen is not rigid. There is no hepatomegaly, splenomegaly or mass.      Tenderness: There is no abdominal  "tenderness. There is no guarding or rebound.      Hernia: No hernia is present.   Musculoskeletal:         General: Normal range of motion.      Cervical back: Neck supple. No edema.      Right lower leg: Edema present.      Left lower leg: Edema present.   Lymphadenopathy:      Cervical: No cervical adenopathy.      Upper Body:      Left upper body: No supraclavicular adenopathy.   Skin:     General: Skin is warm and dry.      Coloration: Skin is not pale.      Findings: No rash.      Nails: There is no clubbing.   Neurological:      Mental Status: She is alert and oriented to person, place, and time.      Cranial Nerves: No cranial nerve deficit.      Sensory: No sensory deficit.   Psychiatric:         Mood and Affect: Mood normal.         Speech: Speech normal.         Behavior: Behavior is cooperative.       Vitals:    04/06/21 1636   BP: 150/84   Pulse: 68   Resp: 16   Temp: 98.4 °F (36.9 °C)   Weight: 92.5 kg (204 lb)   Height: 162.6 cm (64\")     Results Review:   I reviewed the patient's new clinical results.    Admission on 01/15/2021, Discharged on 01/15/2021   Component Date Value Ref Range Status   • Glucose 01/15/2021 130  70 - 130 mg/dL Final    Serial Number: [id number]:  [id number]   • Case Report 01/15/2021    Final                    Value:Surgical Pathology Report                         Case: [id number]                                  Authorizing Provider:  Yuridia Vinson MD  Collected:           01/15/2021 12:43 PM          Ordering Location:     [facility]    Received:            01/15/2021 02:14 PM                                 [facility]                                                                    Pathologist:           Dann Holloway MD                                                            Specimens:   1) - Small Intestine, Ileum, terminal ileum biopsy                                                  2) - Large Intestine, random biopsy: right, transverse " and left colon                               3) - Large Intestine, Right / Ascending Colon                                                       4) - Large Intestine, Left / Descending Colon                                                       5) - Large Intestine, Rectum                                                              • Final Diagnosis 01/15/2021    Final                    Value:This result contains rich text formatting which cannot be displayed here.   Lab on 01/13/2021   Component Date Value Ref Range Status   • SARS-CoV-2 BEATA 01/13/2021 Not Detected  Not Detected Final      No radiology results for the last 90 days.     Colonoscopy dated 1/15/2021 3 polyps removed.  Diverticulosis in sigmoid colon.  Nonbleeding internal hemorrhoids.  Minimal rectal mucosal prolapse noted.  Examined portion of the ileum normal.  Biopsies taken.  Terminal ileum biopsy unremarkable.  Random colon biopsy unremarkable.  Ascending and descending colon polyp biopsies tubular adenoma, no dysplasia.  Rectal polyp biopsy with mucosal tag, no dysplasia.    Assessment / Plan      1. Irritable bowel syndrome with diarrhea  2. Weight loss  Diarrhea has significantly improved.  She has not had further episodes of diarrhea or lower abdominal pain.  She has changed her diet and has been avoiding sugars. She has lost about 9 pounds since her last visit in December 2020 intentionally by changing her diet.  Stool studies negative.  TSH normal.  Celiac panel negative.  Colonoscopy with random colon biopsies unremarkable, no evidence of ileitis or colitis.  Low FODMAP diet.  Bentyl 20 mg 1 po 3 times per day as needed for lower abdominal pain and diarrhea.   May take Imodium 2 mg 1/2-1 caplet 1-2 times per day as needed for diarrhea.  CTAP in the future if abdominal pain returns to rule out solid organ pathology.    12/14/2020  Longstanding history of diarrhea with lower abdominal pain that has gradually worsened since October 2020.   The patient has 3-5 loose to watery bowel movements per day.  She has not traveled, taken antibiotics or been around anyone ill.  No history of rectal bleeding.  She has lost about 12 pounds in the past 2 months unintentionally.  Most recent labs with TSH normal.  Basic labs, celiac panel  Stool studies  Colonoscopy to rule out IBD.    3. Gastroesophageal reflux disease, unspecified whether esophagitis present  4. Pharyngoesophageal dysphagia  Reflux well controlled with Pepcid. Continue same. She has not had further episodes of difficulty swallowing.  Anti-reflux measures.  Will monitor for now as she is not having further episodes of difficulty swallowing. May need EGD in the future if symptoms return to rule out esophageal stricture.    5. Adenomatous polyp of colon, unspecified part of colon  Polyps removed, tubular adenoma without dysplasia.  Colonoscopy for surveillance in 7 years, 2028.     Patient Instructions   Antireflux measures: Avoid fried, fatty foods, alcohol, chocolate, coffee, tea,  soft drinks, peppermint and spearmint, spicy foods, tomatoes and tomato based foods, onion based foods, and smoking. Other antireflux measures include weight reduction if overweight, avoiding tight clothing around the abdomen, elevating the head of the bed 6 inches with blocks under the head board, and don't drink or eat before going to bed and avoid lying down immediately after meals.  Pepcid 20 mg 1 po twice a day.  Bentyl 20 mg 1 po 3 times per day as needed for diarrhea.  Low FODMAP diet   Colonoscopy for surveillance in 7 years, 2028.  Follow up: The patient will call back    Low-FODMAP Eating Plan    FODMAPs (fermentable oligosaccharides, disaccharides, monosaccharides, and polyols) are sugars that are hard for some people to digest. A low-FODMAP eating plan may help some people who have bowel (intestinal) diseases to manage their symptoms.  This meal plan can be complicated to follow. Work with a diet and  nutrition specialist (dietitian) to make a low-FODMAP eating plan that is right for you. A dietitian can make sure that you get enough nutrition from this diet.  What are tips for following this plan?  Reading food labels  · Check labels for hidden FODMAPs such as:  ? High-fructose syrup.  ? Honey.  ? Agave.  ? Natural fruit flavors.  ? Onion or garlic powder.  · Choose low-FODMAP foods that contain 3-4 grams of fiber per serving.  · Check food labels for serving sizes. Eat only one serving at a time to make sure FODMAP levels stay low.  Meal planning  · Follow a low-FODMAP eating plan for up to 6 weeks, or as told by your health care provider or dietitian.  · To follow the eating plan:  1. Eliminate high-FODMAP foods from your diet completely.  2. Gradually reintroduce high-FODMAP foods into your diet one at a time. Most people should wait a few days after introducing one high-FODMAP food before they introduce the next high-FODMAP food. Your dietitian can recommend how quickly you may reintroduce foods.  3. Keep a daily record of what you eat and drink, and make note of any symptoms that you have after eating.  4. Review your daily record with a dietitian regularly. Your dietitian can help you identify which foods you can eat and which foods you should avoid.  General tips  · Drink enough fluid each day to keep your urine pale yellow.  · Avoid processed foods. These often have added sugar and may be high in FODMAPs.  · Avoid most dairy products, whole grains, and sweeteners.  · Work with a dietitian to make sure you get enough fiber in your diet.  Recommended foods  Grains  · Gluten-free grains, such as rice, oats, buckwheat, quinoa, corn, polenta, and millet. Gluten-free pasta, bread, or cereal. Rice noodles. Corn tortillas.  Vegetables  · Eggplant, zucchini, cucumber, peppers, green beans, Tilden sprouts, bean sprouts, lettuce, arugula, kale, Swiss chard, spinach, zeferino greens, bok ramon, summer squash,  "potato, and tomato. Limited amounts of corn, carrot, and sweet potato. Green parts of scallions.  Fruits  · Bananas, oranges, vladimir, limes, blueberries, raspberries, strawberries, grapes, cantaloupe, honeydew melon, kiwi, papaya, passion fruit, and pineapple. Limited amounts of dried cranberries, banana chips, and shredded coconut.  Dairy  · Lactose-free milk, yogurt, and kefir. Lactose-free cottage cheese and ice cream. Non-dairy milks, such as almond, coconut, hemp, and rice milk. Yogurts made of non-dairy milks. Limited amounts of goat cheese, brie, mozzarella, parmesan, swiss, and other hard cheeses.  Meats and other protein foods  · Unseasoned beef, pork, poultry, or fish. Eggs. Porras. Tofu (firm) and tempeh. Limited amounts of nuts and seeds, such as almonds, walnuts, brazil nuts, pecans, peanuts, pumpkin seeds, bertin seeds, and sunflower seeds.  Fats and oils  · Butter-free spreads. Vegetable oils, such as olive, canola, and sunflower oil.  Seasoning and other foods  · Artificial sweeteners with names that do not end in \"ol\" such as aspartame, saccharine, and stevia. Maple syrup, white table sugar, raw sugar, brown sugar, and molasses. Fresh basil, coriander, parsley, rosemary, and thyme.  Beverages  · Water and mineral water. Sugar-sweetened soft drinks. Small amounts of orange juice or cranberry juice. Black and green tea. Most dry marcus. Coffee.  This may not be a complete list of low-FODMAP foods. Talk with your dietitian for more information.  Foods to avoid  Grains  · Wheat, including kamut, durum, and semolina. Barley and bulgur. Couscous. Wheat-based cereals. Wheat noodles, bread, crackers, and pastries.  Vegetables  · Chicory root, artichoke, asparagus, cabbage, snow peas, sugar snap peas, mushrooms, and cauliflower. Onions, garlic, leeks, and the white part of scallions.  Fruits  · Fresh, dried, and juiced forms of apple, pear, watermelon, peach, plum, cherries, apricots, blackberries, " boysenberries, figs, nectarines, and lorie. Avocado.  Dairy  · Milk, yogurt, ice cream, and soft cheese. Cream and sour cream. Milk-based sauces. Custard.  Meats and other protein foods  · Fried or fatty meat. Sausage. Cashews and pistachios. Soybeans, baked beans, black beans, chickpeas, kidney beans, luana beans, navy beans, lentils, and split peas.  Seasoning and other foods  · Any sugar-free gum or candy. Foods that contain artificial sweeteners such as sorbitol, mannitol, isomalt, or xylitol. Foods that contain honey, high-fructose corn syrup, or agave. Bouillon, vegetable stock, beef stock, and chicken stock. Garlic and onion powder. Condiments made with onion, such as hummus, chutney, pickles, relish, salad dressing, and salsa. Tomato paste.  Beverages  · Chicory-based drinks. Coffee substitutes. Chamomile tea. Fennel tea. Sweet or fortified marcus such as port or jose. Diet soft drinks made with isomalt, mannitol, maltitol, sorbitol, or xylitol. Apple, pear, and lorie juice. Juices with high-fructose corn syrup.  This may not be a complete list of high-FODMAP foods. Talk with your dietitian to discuss what dietary choices are best for you.   Summary  · A low-FODMAP eating plan is a short-term diet that eliminates FODMAPs from your diet to help ease symptoms of certain bowel diseases.  · The eating plan usually lasts up to 6 weeks. After that, high-FODMAP foods are restarted gradually, one at a time, so you can find out which may be causing symptoms.  · A low-FODMAP eating plan can be complicated. It is best to work with a dietitian who has experience with this type of plan.  This information is not intended to replace advice given to you by your health care provider. Make sure you discuss any questions you have with your health care provider.  Document Revised: 11/30/2018 Document Reviewed: 08/14/2018  [employer] Patient Education © 2021 [employer]    Tito Miguel, [place]  4/6/2021    Please note that  portions of this note may have been completed with a voice recognition program. Efforts were made to edit the dictations, but occasionally words are mistranscribed.

## 2022-03-31 ENCOUNTER — LAB (OUTPATIENT)
Dept: LAB | Facility: HOSPITAL | Age: 72
End: 2022-03-31

## 2022-03-31 ENCOUNTER — OFFICE VISIT (OUTPATIENT)
Dept: GASTROENTEROLOGY | Facility: CLINIC | Age: 72
End: 2022-03-31

## 2022-03-31 VITALS
RESPIRATION RATE: 18 BRPM | HEART RATE: 85 BPM | OXYGEN SATURATION: 98 % | WEIGHT: 191 LBS | HEIGHT: 64 IN | BODY MASS INDEX: 32.61 KG/M2 | SYSTOLIC BLOOD PRESSURE: 124 MMHG | DIASTOLIC BLOOD PRESSURE: 72 MMHG | TEMPERATURE: 97.6 F

## 2022-03-31 DIAGNOSIS — R19.7 DIARRHEA, UNSPECIFIED TYPE: ICD-10-CM

## 2022-03-31 DIAGNOSIS — K21.9 GASTROESOPHAGEAL REFLUX DISEASE, UNSPECIFIED WHETHER ESOPHAGITIS PRESENT: Chronic | ICD-10-CM

## 2022-03-31 DIAGNOSIS — E11.42 DIABETIC PERIPHERAL NEUROPATHY ASSOCIATED WITH TYPE 2 DIABETES MELLITUS: ICD-10-CM

## 2022-03-31 DIAGNOSIS — D64.9 ANEMIA, UNSPECIFIED TYPE: Chronic | ICD-10-CM

## 2022-03-31 DIAGNOSIS — R10.30 LOWER ABDOMINAL PAIN: ICD-10-CM

## 2022-03-31 DIAGNOSIS — K58.0 IRRITABLE BOWEL SYNDROME WITH DIARRHEA: Primary | Chronic | ICD-10-CM

## 2022-03-31 DIAGNOSIS — D64.9 ANEMIA, UNSPECIFIED TYPE: ICD-10-CM

## 2022-03-31 DIAGNOSIS — R13.19 ESOPHAGEAL DYSPHAGIA: ICD-10-CM

## 2022-03-31 DIAGNOSIS — R63.4 WEIGHT LOSS: ICD-10-CM

## 2022-03-31 DIAGNOSIS — Z86.010 PERSONAL HISTORY OF COLONIC POLYPS: ICD-10-CM

## 2022-03-31 LAB
DEPRECATED RDW RBC AUTO: 42.5 FL (ref 37–54)
ERYTHROCYTE [DISTWIDTH] IN BLOOD BY AUTOMATED COUNT: 13.2 % (ref 12.3–15.4)
HCT VFR BLD AUTO: 37.5 % (ref 34–46.6)
HGB BLD-MCNC: 12.4 G/DL (ref 12–15.9)
MCH RBC QN AUTO: 29.2 PG (ref 26.6–33)
MCHC RBC AUTO-ENTMCNC: 33.1 G/DL (ref 31.5–35.7)
MCV RBC AUTO: 88.2 FL (ref 79–97)
PLATELET # BLD AUTO: 313 10*3/MM3 (ref 140–450)
PMV BLD AUTO: 8.8 FL (ref 6–12)
RBC # BLD AUTO: 4.25 10*6/MM3 (ref 3.77–5.28)
VIT B12 BLD-MCNC: 1218 PG/ML (ref 211–946)
WBC NRBC COR # BLD: 6.49 10*3/MM3 (ref 3.4–10.8)

## 2022-03-31 PROCEDURE — 85027 COMPLETE CBC AUTOMATED: CPT

## 2022-03-31 PROCEDURE — 82607 VITAMIN B-12: CPT

## 2022-03-31 PROCEDURE — 80053 COMPREHEN METABOLIC PANEL: CPT

## 2022-03-31 PROCEDURE — 99214 OFFICE O/P EST MOD 30 MIN: CPT | Performed by: NURSE PRACTITIONER

## 2022-03-31 PROCEDURE — 36415 COLL VENOUS BLD VENIPUNCTURE: CPT

## 2022-03-31 PROCEDURE — 83540 ASSAY OF IRON: CPT

## 2022-03-31 RX ORDER — SODIUM CHLORIDE 9 MG/ML
70 INJECTION, SOLUTION INTRAVENOUS CONTINUOUS PRN
Status: CANCELLED | OUTPATIENT
Start: 2022-03-31

## 2022-03-31 RX ORDER — OMEPRAZOLE 40 MG/1
CAPSULE, DELAYED RELEASE ORAL
Qty: 30 CAPSULE | Refills: 3 | Status: SHIPPED | OUTPATIENT
Start: 2022-03-31 | End: 2022-07-07 | Stop reason: SDUPTHER

## 2022-03-31 RX ORDER — DICYCLOMINE HCL 20 MG
20 TABLET ORAL 2 TIMES DAILY
COMMUNITY
End: 2022-03-31 | Stop reason: SDUPTHER

## 2022-03-31 RX ORDER — LOSARTAN POTASSIUM 25 MG/1
25 TABLET ORAL DAILY
COMMUNITY

## 2022-03-31 RX ORDER — ZINC GLUCONATE 50 MG
1 TABLET ORAL DAILY
COMMUNITY

## 2022-03-31 RX ORDER — DICYCLOMINE HCL 20 MG
20 TABLET ORAL 2 TIMES DAILY
Qty: 30 TABLET | Refills: 1 | Status: SHIPPED | OUTPATIENT
Start: 2022-03-31 | End: 2022-07-07 | Stop reason: SDUPTHER

## 2022-03-31 RX ORDER — NIACIN 500 MG/1
500 TABLET ORAL DAILY
COMMUNITY

## 2022-03-31 RX ORDER — ACETAMINOPHEN 500 MG
500 TABLET ORAL EVERY 6 HOURS PRN
COMMUNITY

## 2022-03-31 RX ORDER — MULTIVIT,TX WITH IRON,MINERALS
27 TABLET, EXTENDED RELEASE ORAL 2 TIMES DAILY
COMMUNITY
End: 2022-07-07

## 2022-03-31 RX ORDER — MULTIPLE VITAMINS W/ MINERALS TAB 9MG-400MCG
1 TAB ORAL DAILY
COMMUNITY
End: 2022-07-07

## 2022-03-31 NOTE — PROGRESS NOTES
Follow Up Note     Date: 2022   Patient Name: Harley Rodriges  MRN: [id number]  : 1950     Primary Care Provider: Bam German PA     Chief Complaint   Patient presents with   • Follow-up   • Diarrhea     History of present illness:   3/31/2022  Harley Rodriges is a 71 y.o. female who is here today for follow up for diarrhea.    She has a history of diarrhea for many years, but it worsened over the past 3-4 months. She is having 4-5 episodes per day. Stools are loose to watery. She is taking magnesium, but not sure if diarrhea was occurring prior to diarrhea worsening. She has not traveled or been around anyone ill. She has not taken any antibiotics. She has abdominal pain associated with bowel movements. She does drink green tea every morning.    She has been having worsening of acid reflux. She is taking Pepcid, TUMs and Pepto but it is not helping. She continues to have difficulty swallowing that has not improved.     Interval History:  2021  Harley Rodriges is a 70 y.o. female who is here today for follow up after colonoscopy.  She has been feeling better. Diarrhea and lower abdominal pain have improved. She has changed her diet and avoided sugars, which has helped. No rectal bleeding. She is intentionally trying to lose weight at this time and has lost 9 pounds since 2020 by changing her diet. Heartburn is well controlled.  She has not had any further episodes of difficulty swallowing.     2020  The patient has a history of diarrhea for many years.  Since 2020, diarrhea worsened. She has 3-5 loose to watery bowel movements per day. The patient has not traveled, taken antibiotics or been around anyone ill. No rectal bleeding. She has lower abdominal pain associated with bowel movements. No history of nausea or vomiting.      She has lost about 12 pounds in the past 2 months unintentionally. She has changed her eating habits due to diarrhea. No loss of appetite.  "She has a history of heartburn that is well controlled with Pepcid at night. She has occasional difficulty swallowing solids and liquids. It feels food and drinks will become \"stuck\" in the back of her throat that do not want to go down about once per month.       The patient's last colonoscopy and EGD were in 2016 by Dr. Cason in [place], [place]. She had a few polyps removed, pathology unknown. There is no family history of GI malignancy or IBD.     Subjective      Past Medical History:   Diagnosis Date   • Colon polyp 2016   • Diabetes mellitus (HCC)    • Diabetic polyneuropathy (HCC)    • Disease of thyroid gland    • Esophagitis    • Fibromyalgia    • Gastritis    • History of colon polyps    • Hyperlipidemia    • Hypertension    • Iron deficiency    • Lyme disease      Past Surgical History:   Procedure Laterality Date   • APPENDECTOMY      1980   • BREAST LUMPECTOMY WITH SENTINEL NODE BIOPSY AND AXILLARY NODE DISSECTION      1981   • CARDIAC CATHETERIZATION      2015   • COLONOSCOPY  2016   • COLONOSCOPY N/A 1/15/2021    Procedure: COLONOSCOPY WITH BIOSPIES AND POLYPECTOMY;  Surgeon: Yuridia Vinson MD;  Location: [facility] ENDOSCOPY;  Service: Gastroenterology;  Laterality: N/A;   • COLONOSCOPY W/ BIOPSIES  2016    Dr. Cason   • ENDOSCOPY  2016    Dr. Cason   • HYSTERECTOMY      1980   • TONSILLECTOMY      1956 at age 10   • UPPER GASTROINTESTINAL ENDOSCOPY  2016     Family History   Problem Relation Age of Onset   • Heart disease Mother    • Skin cancer Mother    • Heart disease Father    • Arthritis Father    • Polymyalgia rheumatica Father    • Skin cancer Father    • Colon cancer Neg Hx    • Inflammatory bowel disease Neg Hx      Social History     Socioeconomic History   • Marital status:    Tobacco Use   • Smoking status: Never Smoker   • Smokeless tobacco: Never Used   Vaping Use   • Vaping Use: Never used   Substance and Sexual Activity   • Alcohol use: Never   • Drug use: Never   • " Sexual activity: Defer       Current Outpatient Medications:   •  acetaminophen (TYLENOL) 500 MG tablet, Take 500 mg by mouth Every 6 (Six) Hours As Needed for Mild Pain ., Disp: , Rfl:   •  dicyclomine (BENTYL) 20 MG tablet, Take 1 tablet by mouth 2 (Two) Times a Day., Disp: 30 tablet, Rfl: 1  •  estradiol (ESTRACE) 0.5 MG tablet, Take 0.5 mg by mouth Daily., Disp: , Rfl:   •  gabapentin (NEURONTIN) 400 MG capsule, Take 400 mg by mouth 4 (Four) Times a Day., Disp: , Rfl:   •  hydroCHLOROthiazide (HYDRODIURIL) 25 MG tablet, Take 25 mg by mouth Daily., Disp: , Rfl:   •  loratadine (CLARITIN) 10 MG tablet, Take 10 mg by mouth Daily., Disp: , Rfl:   •  losartan (COZAAR) 25 MG tablet, Take 25 mg by mouth Daily., Disp: , Rfl:   •  magnesium gluconate 250 MG tablet tablet, Take 27 mg by mouth 2 (Two) Times a Day., Disp: , Rfl:   •  metFORMIN (GLUCOPHAGE) 1000 MG tablet, Take 1,000 mg by mouth 2 (Two) Times a Day With Meals., Disp: , Rfl:   •  Multiple Vitamins-Minerals (ONE-A-DAY WOMENS 50+ PO), Take  by mouth., Disp: , Rfl:   •  multivitamin with minerals (HAIR SKIN AND NAILS FORMULA PO), Take 1 tablet by mouth Daily., Disp: , Rfl:   •  Niacin, Antihyperlipidemic, 500 MG tablet, Take 500 mg by mouth Daily., Disp: , Rfl:   •  Thyroid 60 MG PO tablet, Take 60 mg by mouth Daily. Total 75 mg per day, Disp: , Rfl:   •  Zinc 50 MG tablet, Take 1 tablet by mouth Daily., Disp: , Rfl:   •  omeprazole (priLOSEC) 40 MG capsule, 1 po daily in the am 30 minutes before breakfast, Disp: 30 capsule, Rfl: 3     Allergies   Allergen Reactions   • Prednisone Irritability     The following portions of the patient's history were reviewed and updated as appropriate: allergies, current medications, past family history, past medical history, past social history, past surgical history and problem list.  Objective     Physical Exam  Vitals and nursing note reviewed.   Constitutional:       General: She is not in acute distress.     Appearance:  "Normal appearance. She is well-developed.   HENT:      Head: Normocephalic and atraumatic.      Mouth/Throat:      Mouth: Mucous membranes are not pale, not dry and not cyanotic.   Eyes:      General: Lids are normal.   Neck:      Trachea: Trachea normal.   Cardiovascular:      Rate and Rhythm: Normal rate.   Pulmonary:      Effort: Pulmonary effort is normal. No respiratory distress.      Breath sounds: Normal breath sounds.   Abdominal:      General: Bowel sounds are normal.      Palpations: Abdomen is soft. There is no mass.      Tenderness: There is abdominal tenderness (mild) in the right lower quadrant, suprapubic area and left lower quadrant.      Hernia: No hernia is present.   Skin:     General: Skin is warm and dry.   Neurological:      Mental Status: She is alert and oriented to person, place, and time.   Psychiatric:         Mood and Affect: Mood normal.         Speech: Speech normal.         Behavior: Behavior normal. Behavior is cooperative.       Vitals:    03/31/22 1435   BP: 124/72   Pulse: 85   Resp: 18   Temp: 97.6 °F (36.4 °C)   SpO2: 98%   Weight: 86.6 kg (191 lb)   Height: 162.6 cm (64\")     Body mass index is 32.79 kg/m².     Results Review:   I reviewed the patient's new clinical results.    No visits with results within 90 Day(s) from this visit.   Latest known visit with results is:   Admission on 01/15/2021, Discharged on 01/15/2021   Component Date Value Ref Range Status   • Glucose 01/15/2021 130  70 - 130 mg/dL Final    Serial Number: [id number]:  [id number]   • Case Report 01/15/2021    Final                    Value:Surgical Pathology Report                         Case: [id number]                                  Authorizing Provider:  Yuridia Vinson MD  Collected:           01/15/2021 12:43 PM          Ordering Location:     [facility]    Received:            01/15/2021 02:14 PM                                 [facility]                                        "                             Pathologist:           Dann Holloway MD                                                            Specimens:   1) - Small Intestine, Ileum, terminal ileum biopsy                                                  2) - Large Intestine, random biopsy: right, transverse and left colon                               3) - Large Intestine, Right / Ascending Colon                                                       4) - Large Intestine, Left / Descending Colon                                                       5) - Large Intestine, Rectum                                                              • Final Diagnosis 01/15/2021    Final                    Value:This result contains rich text formatting which cannot be displayed here.      No radiology results for the last 90 days.     Dated 2/10/2022  Hgb 11.5, Hct 34.4, Platelets 328, TSH 0.12, Magnesium 1.56, Ferritin 296    Colonoscopy dated 1/15/2021 per Dr. Vinson  - 3 polyps removed.    - Diverticulosis in sigmoid colon.    - Nonbleeding internal hemorrhoids.    - Minimal rectal mucosal prolapse noted.    - Examined portion of the ileum normal.  Biopsies taken.    - Terminal ileum biopsy unremarkable.  Random colon biopsy unremarkable.  Ascending and descending colon polyp biopsies tubular adenoma, no dysplasia.  Rectal polyp biopsy with mucosal tag, no dysplasia.    Assessment / Plan      1. Diarrhea, unspecified  2. Lower abdominal pain, unspecified  3. Irritable bowel syndrome with diarrhea  4. Weight loss  Diarrhea has worsened over the past few months. She is having 4-5 episodes of loose to watery stools daily. She has associated lower abdoiminal pain associated with diarrhea. She has lost about 13 pounds in the past year that is unintentional.  Denies rectal bleeding. She has been taking Magnesium caplets daily for magnesium level that was mildly decreased. She is also on Metformin 1000 mg twice a day. Magnesium and Metformin  can contribute to her diarrhea. TSH normal.   CT Abdomen and pelvis to rule out solid organ pathology.  Labs  Stool studies  Low FODMAP diet - avoid all dairy.   Imodium 2 mg 1/2-1 caplet 1-2 times per day as needed for diarrhea.   Bentyl 20 mg 1 po 3 times per day as needed for abdominal pain and diarrhea.     4/6/2021  Diarrhea has significantly improved.  She has not had further episodes of diarrhea or lower abdominal pain.  She has changed her diet and has been avoiding sugars. She has lost about 9 pounds since her last visit in December 2020 intentionally by changing her diet.  Stool studies negative.  TSH normal.  Celiac panel negative.  Colonoscopy with random colon biopsies unremarkable, no evidence of ileitis or colitis.  CTAP in the future if abdominal pain returns to rule out solid organ pathology.     12/14/2020  Longstanding history of diarrhea with lower abdominal pain that has gradually worsened since October 2020.  The patient has 3-5 loose to watery bowel movements per day.  She has not traveled, taken antibiotics or been around anyone ill.  No history of rectal bleeding.  She has lost about 12 pounds in the past 2 months unintentionally.  Most recent labs with TSH normal.  Basic labs, celiac panel  Stool studies  Colonoscopy to rule out IBD.     5. Gastroesophageal reflux disease, unspecified whether esophagitis present  6. Pharyngoesophageal dysphagia  Reflux is worsening and not controlled with Pepcid 20 mg daily. She is also taking TUMs and Pepto, but it does not help. Difficulty swallowing is not improving and occurs several days per week.   Omeprazole 40 mg 1 po daily.  Stop Pepcid.   Advised to eat a softer diet, chew food very well and take sips of water between bites.   EGD to rule out esophageal abnormality.     4/6/2021  Reflux well controlled with Pepcid. Continue same. She has not had further episodes of difficulty swallowing.  Anti-reflux measures.  Will monitor for now as she is not  having further episodes of difficulty swallowing. May need EGD in the future if symptoms return to rule out esophageal stricture.     7. Anemia, unspecified  She has a history of anemia since she was a small child. She has been taking iron supplements and recently cut back per PCP. Denies GI bleeding. She has not been a smoker.  Colonoscopy with polyps, otherwise unremarkable.  Labs  CTAP to rule out solid organ pathology.   EGD to rule out upper GI cause for anemia.    8. Personal history of colon polyps  Polyps removed, tubular adenoma without dysplasia.  Colonoscopy for surveillance in 7 years, 2028.     Patient Instructions   1. Antireflux measures: Avoid fried, fatty foods, alcohol, chocolate, coffee, tea,  soft drinks, peppermint and spearmint, spicy foods, tomatoes and tomato based foods, onion based foods, and smoking.   2. Other antireflux measures include weight reduction if overweight, avoiding tight clothing around the abdomen, elevating the head of the bed 6 inches with blocks under the head board, and don't drink or eat before going to bed and avoid lying down immediately after meals.   3. Omeprazole 40 mg 1 po daily 30 minutes before breakfast.  4. Stop Pepcid.   5. Bentyl 20 mg 1 po 3 times per day as needed for lower abdominal pain and diarrhea.  6. Avoid green tea.   7. Advised to take thyroid medication first, wait 30 minutes, then take Omeprazole, wait 30 minutes, then take other medications.   8. Advised to eat a softer diet, chew food very well and take sips of water between bites.   9. Low FODMAP diet - avoid all dairy.   10. Labs  11. Stool studies  12. CT Abdomen and pelvis.  13. Upper endoscopy-EGD: The indications, technique, alternatives and potential risk and complications were discussed with the patient including but not limited to bleeding, perforations, missing lesions and anesthetic complications. The patient understands and wishes to proceed with the procedure and has given their  verbal consent. Written patient education information was given to the patient.   14. The patient will call if they have further questions before procedure.   15. COVID-19 testing prior to procedure. The patient will need to self-quarantine after testing until the procedure. Instructions given to patient.       Low-FODMAP Eating Plan    FODMAP stands for fermentable oligosaccharides, disaccharides, monosaccharides, and polyols. These are sugars that are hard for some people to digest. A low-FODMAP eating plan may help some people who have irritable bowel syndrome (IBS) and certain other bowel (intestinal) diseases to manage their symptoms.  This meal plan can be complicated to follow. Work with a diet and nutrition specialist (dietitian) to make a low-FODMAP eating plan that is right for you. A dietitian can help make sure that you get enough nutrition from this diet.  What are tips for following this plan?  Reading food labels  1. Check labels for hidden FODMAPs such as:  ? High-fructose syrup.  ? Honey.  ? Agave.  ? Natural fruit flavors.  ? Onion or garlic powder.  2. Choose low-FODMAP foods that contain 3-4 grams of fiber per serving.  3. Check food labels for serving sizes. Eat only one serving at a time to make sure FODMAP levels stay low.  Shopping  · Shop with a list of foods that are recommended on this diet and make a meal plan.  Meal planning  1. Follow a low-FODMAP eating plan for up to 6 weeks, or as told by your health care provider or dietitian.  2. To follow the eating plan:  1. Eliminate high-FODMAP foods from your diet completely. Choose only low-FODMAP foods to eat. You will do this for 2-6 weeks.  2. Gradually reintroduce high-FODMAP foods into your diet one at a time. Most people should wait a few days before introducing the next new high-FODMAP food into their meal plan. Your dietitian can recommend how quickly you may reintroduce foods.  3. Keep a daily record of what and how much you eat and  "drink. Make note of any symptoms that you have after eating.  4. Review your daily record with a dietitian regularly to identify which foods you can eat and which foods you should avoid.  General tips  · Drink enough fluid each day to keep your urine pale yellow.  · Avoid processed foods. These often have added sugar and may be high in FODMAPs.  · Avoid most dairy products, whole grains, and sweeteners.  · Work with a dietitian to make sure you get enough fiber in your diet.  · Avoid high FODMAP foods at meals to manage symptoms.    Recommended foods  Fruits  Bananas, oranges, tangerines, vladimir, limes, blueberries, raspberries, strawberries, grapes, cantaloupe, honeydew melon, kiwi, papaya, passion fruit, and pineapple. Limited amounts of dried cranberries, banana chips, and shredded coconut.  Vegetables  Eggplant, zucchini, cucumber, peppers, green beans, bean sprouts, lettuce, arugula, kale, Swiss chard, spinach, zefreino greens, bok ramon, summer squash, potato, and tomato. Limited amounts of corn, carrot, and sweet potato. Green parts of scallions.  Grains  Gluten-free grains, such as rice, oats, buckwheat, quinoa, corn, polenta, and millet. Gluten-free pasta, bread, or cereal. Rice noodles. Corn tortillas.  Meats and other proteins  Unseasoned beef, pork, poultry, or fish. Eggs. Porras. Tofu (firm) and tempeh. Limited amounts of nuts and seeds, such as almonds, walnuts, brazil nuts, pecans, peanuts, nut butters, pumpkin seeds, bertin seeds, and sunflower seeds.  Dairy  Lactose-free milk, yogurt, and kefir. Lactose-free cottage cheese and ice cream. Non-dairy milks, such as almond, coconut, hemp, and rice milk. Non-dairy yogurt. Limited amounts of goat cheese, brie, mozzarella, parmesan, swiss, and other hard cheeses.  Fats and oils  Butter-free spreads. Vegetable oils, such as olive, canola, and sunflower oil.  Seasoning and other foods  Artificial sweeteners with names that do not end in \"ol,\" such as aspartame, " saccharine, and stevia. Maple syrup, white table sugar, raw sugar, brown sugar, and molasses. Mayonnaise, soy sauce, and tamari. Fresh basil, coriander, parsley, rosemary, and thyme.  Beverages  Water and mineral water. Sugar-sweetened soft drinks. Small amounts of orange juice or cranberry juice. Black and green tea. Most dry marcus. Coffee.  The items listed above may not be a complete list of foods and beverages you can eat. Contact a dietitian for more information.    Foods to avoid  Fruits  Fresh, dried, and juiced forms of apple, pear, watermelon, peach, plum, cherries, apricots, blackberries, boysenberries, figs, nectarines, and lorie. Avocado.  Vegetables  Chicory root, artichoke, asparagus, cabbage, snow peas, Arlee sprouts, broccoli, sugar snap peas, mushrooms, celery, and cauliflower. Onions, garlic, leeks, and the white part of scallions.  Grains  Wheat, including kamut, durum, and semolina. Barley and bulgur. Couscous. Wheat-based cereals. Wheat noodles, bread, crackers, and pastries.  Meats and other proteins  Fried or fatty meat. Sausage. Cashews and pistachios. Soybeans, baked beans, black beans, chickpeas, kidney beans, luana beans, navy beans, lentils, black-eyed peas, and split peas.  Dairy  Milk, yogurt, ice cream, and soft cheese. Cream and sour cream. Milk-based sauces. Custard. Buttermilk. Soy milk.  Seasoning and other foods  Any sugar-free gum or candy. Foods that contain artificial sweeteners such as sorbitol, mannitol, isomalt, or xylitol. Foods that contain honey, high-fructose corn syrup, or agave. Bouillon, vegetable stock, beef stock, and chicken stock. Garlic and onion powder. Condiments made with onion, such as hummus, chutney, pickles, relish, salad dressing, and salsa. Tomato paste.  Beverages  Chicory-based drinks. Coffee substitutes. Chamomile tea. Fennel tea. Sweet or fortified marcus such as port or jose. Diet soft drinks made with isomalt, mannitol, maltitol, sorbitol, or  xylitol. Apple, pear, and lorie juice. Juices with high-fructose corn syrup.  The items listed above may not be a complete list of foods and beverages you should avoid. Contact a dietitian for more information.  Summary  · FODMAP stands for fermentable oligosaccharides, disaccharides, monosaccharides, and polyols. These are sugars that are hard for some people to digest.  · A low-FODMAP eating plan is a short-term diet that helps to ease symptoms of certain bowel diseases.  · The eating plan usually lasts up to 6 weeks. After that, high-FODMAP foods are reintroduced gradually and one at a time. This can help you find out which foods may be causing symptoms.  · A low-FODMAP eating plan can be complicated. It is best to work with a dietitian who has experience with this type of plan.  This information is not intended to replace advice given to you by your health care provider. Make sure you discuss any questions you have with your health care provider.  Document Revised: 05/06/2021 Document Reviewed: 05/06/2021  [employer] Patient Education © 2021 [employer]    Heartburn  Heartburn is a type of pain or discomfort that can happen in the throat or chest. It is often described as a burning pain. It may also cause a bad, acid-like taste in the mouth. Heartburn may feel worse when you lie down or bend over, and it is often worse at night. Heartburn may be caused by stomach contents that move back up into the esophagus (reflux).  Follow these instructions at home:  Eating and drinking    1. Avoid certain foods and drinks as told by your health care provider. This may include:  ? Coffee and tea, with or without caffeine.  ? Drinks that contain alcohol.  ? Energy drinks and sports drinks.  ? Carbonated drinks or sodas.  ? Chocolate and cocoa.  ? Peppermint and mint flavorings.  ? Garlic and onions.  ? Horseradish.  ? Spicy and acidic foods, including peppers, chili powder, haq powder, vinegar, hot sauces, and barbecue  sauce.  ? Citrus fruit juices and citrus fruits, such as oranges, vladimir, and limes.  ? Tomato-based foods, such as red sauce, chili, salsa, and pizza with red sauce.  ? Fried and fatty foods, such as donuts, french fries, potato chips, and high-fat dressings.  ? High-fat meats, such as hot dogs and fatty cuts of red and white meats, such as rib eye steak, sausage, ham, and ramirez.  ? High-fat dairy items, such as whole milk, butter, and cream cheese.  2. Eat small, frequent meals instead of large meals.  3. Avoid drinking large amounts of liquid with your meals.  4. Avoid eating meals during the 2-3 hours before bedtime.  5. Avoid lying down right after you eat.  6. Do not exercise right after you eat.    Lifestyle         · If you are overweight, reduce your weight to an amount that is healthy for you. Ask your health care provider for guidance about a safe weight loss goal.  · Do not use any products that contain nicotine or tobacco. These products include cigarettes, chewing tobacco, and vaping devices, such as e-cigarettes. These can make symptoms worse. If you need help quitting, ask your health care provider.  · Wear loose-fitting clothing. Do not wear anything tight around your waist that causes pressure on your abdomen.  · Raise (elevate) the head of your bed about 6 inches (15 cm) when you sleep. You can use a wedge to do this.  · Try to reduce your stress, such as with yoga or meditation. If you need help reducing stress, ask your health care provider.  Medicines  · Take over-the-counter and prescription medicines only as told by your health care provider.  · Do not take aspirin or NSAIDs, such as ibuprofen, unless your health care provider told you to do so.  · Stop medicines only as told by your health care provider. If you stop taking some medicines too quickly, your symptoms may get worse.  General instructions  · Pay attention to any changes in your symptoms.  · Keep all follow-up visits. This is  important.  Contact a health care provider if:  · You have new symptoms.  · You have unexplained weight loss.  · You have difficulty swallowing, or it hurts to swallow.  · You have wheezing or a persistent cough.  · Your symptoms do not improve with treatment.  · You have frequent heartburn for more than 2 weeks.  Get help right away if:  · You suddenly have pain in your arms, neck, jaw, teeth, or back.  · You suddenly feel sweaty, dizzy, or light-headed.  · You have chest pain or shortness of breath.  · You vomit and your vomit looks like blood or coffee grounds.  · Your stool is bloody or black.  These symptoms may represent a serious problem that is an emergency. Do not wait to see if the symptoms will go away. Get medical help right away. Call your local emergency services (911 in the [place] Do not drive yourself to the hospital.  Summary  · Heartburn is a type of pain or discomfort that can happen in the throat or chest. It is often described as a burning pain. It may also cause a bad, acid-like taste in the mouth.  · Avoid certain foods and drinks as told by your health care provider.  · Take over-the-counter and prescription medicines only as told by your health care provider. Do not take aspirin or NSAIDs, such as ibuprofen, unless your health care provider told you to do so.  · Contact a health care provider if your symptoms do not improve or they get worse.  This information is not intended to replace advice given to you by your health care provider. Make sure you discuss any questions you have with your health care provider.  Document Revised: 06/23/2021 Document Reviewed: 06/23/2021  Elsevier Patient Education © 2021 [employer]    Tito Miguel, APRN  3/31/2022    Please note that portions of this note may have been completed with a voice recognition program. Efforts were made to edit the dictations, but occasionally words are mistranscribed.

## 2022-03-31 NOTE — PATIENT INSTRUCTIONS
Antireflux measures: Avoid fried, fatty foods, alcohol, chocolate, coffee, tea,  soft drinks, peppermint and spearmint, spicy foods, tomatoes and tomato based foods, onion based foods, and smoking.   Other antireflux measures include weight reduction if overweight, avoiding tight clothing around the abdomen, elevating the head of the bed 6 inches with blocks under the head board, and don't drink or eat before going to bed and avoid lying down immediately after meals.   Omeprazole 40 mg 1 po daily 30 minutes before breakfast.  Stop Pepcid.   Bentyl 20 mg 1 po 3 times per day as needed for lower abdominal pain and diarrhea.  Avoid green tea.   Advised to take thyroid medication first, wait 30 minutes, then take Omeprazole, wait 30 minutes, then take other medications.   Advised to eat a softer diet, chew food very well and take sips of water between bites.   Low FODMAP diet - avoid all dairy.   Labs  Stool studies  CT Abdomen and pelvis.  Upper endoscopy-EGD: The indications, technique, alternatives and potential risk and complications were discussed with the patient including but not limited to bleeding, perforations, missing lesions and anesthetic complications. The patient understands and wishes to proceed with the procedure and has given their verbal consent. Written patient education information was given to the patient.   The patient will call if they have further questions before procedure.   COVID-19 testing prior to procedure. The patient will need to self-quarantine after testing until the procedure. Instructions given to patient.       Low-FODMAP Eating Plan    FODMAP stands for fermentable oligosaccharides, disaccharides, monosaccharides, and polyols. These are sugars that are hard for some people to digest. A low-FODMAP eating plan may help some people who have irritable bowel syndrome (IBS) and certain other bowel (intestinal) diseases to manage their symptoms.  This meal plan can be complicated to  follow. Work with a diet and nutrition specialist (dietitian) to make a low-FODMAP eating plan that is right for you. A dietitian can help make sure that you get enough nutrition from this diet.  What are tips for following this plan?  Reading food labels  Check labels for hidden FODMAPs such as:  High-fructose syrup.  Honey.  Agave.  Natural fruit flavors.  Onion or garlic powder.  Choose low-FODMAP foods that contain 3-4 grams of fiber per serving.  Check food labels for serving sizes. Eat only one serving at a time to make sure FODMAP levels stay low.  Shopping  Shop with a list of foods that are recommended on this diet and make a meal plan.  Meal planning  Follow a low-FODMAP eating plan for up to 6 weeks, or as told by your health care provider or dietitian.  To follow the eating plan:  Eliminate high-FODMAP foods from your diet completely. Choose only low-FODMAP foods to eat. You will do this for 2-6 weeks.  Gradually reintroduce high-FODMAP foods into your diet one at a time. Most people should wait a few days before introducing the next new high-FODMAP food into their meal plan. Your dietitian can recommend how quickly you may reintroduce foods.  Keep a daily record of what and how much you eat and drink. Make note of any symptoms that you have after eating.  Review your daily record with a dietitian regularly to identify which foods you can eat and which foods you should avoid.  General tips  Drink enough fluid each day to keep your urine pale yellow.  Avoid processed foods. These often have added sugar and may be high in FODMAPs.  Avoid most dairy products, whole grains, and sweeteners.  Work with a dietitian to make sure you get enough fiber in your diet.  Avoid high FODMAP foods at meals to manage symptoms.    Recommended foods  Fruits  Bananas, oranges, tangerines, vladimir, limes, blueberries, raspberries, strawberries, grapes, cantaloupe, honeydew melon, kiwi, papaya, passion fruit, and pineapple.  "Limited amounts of dried cranberries, banana chips, and shredded coconut.  Vegetables  Eggplant, zucchini, cucumber, peppers, green beans, bean sprouts, lettuce, arugula, kale, Swiss chard, spinach, zeferino greens, bok ramon, summer squash, potato, and tomato. Limited amounts of corn, carrot, and sweet potato. Green parts of scallions.  Grains  Gluten-free grains, such as rice, oats, buckwheat, quinoa, corn, polenta, and millet. Gluten-free pasta, bread, or cereal. Rice noodles. Corn tortillas.  Meats and other proteins  Unseasoned beef, pork, poultry, or fish. Eggs. Porras. Tofu (firm) and tempeh. Limited amounts of nuts and seeds, such as almonds, walnuts, brazil nuts, pecans, peanuts, nut butters, pumpkin seeds, bertin seeds, and sunflower seeds.  Dairy  Lactose-free milk, yogurt, and kefir. Lactose-free cottage cheese and ice cream. Non-dairy milks, such as almond, coconut, hemp, and rice milk. Non-dairy yogurt. Limited amounts of goat cheese, brie, mozzarella, parmesan, swiss, and other hard cheeses.  Fats and oils  Butter-free spreads. Vegetable oils, such as olive, canola, and sunflower oil.  Seasoning and other foods  Artificial sweeteners with names that do not end in \"ol,\" such as aspartame, saccharine, and stevia. Maple syrup, white table sugar, raw sugar, brown sugar, and molasses. Mayonnaise, soy sauce, and tamari. Fresh basil, coriander, parsley, rosemary, and thyme.  Beverages  Water and mineral water. Sugar-sweetened soft drinks. Small amounts of orange juice or cranberry juice. Black and green tea. Most dry marcus. Coffee.  The items listed above may not be a complete list of foods and beverages you can eat. Contact a dietitian for more information.    Foods to avoid  Fruits  Fresh, dried, and juiced forms of apple, pear, watermelon, peach, plum, cherries, apricots, blackberries, boysenberries, figs, nectarines, and lorie. Avocado.  Vegetables  Chicory root, artichoke, asparagus, cabbage, snow peas, " [place] sprouts, broccoli, sugar snap peas, mushrooms, celery, and cauliflower. Onions, garlic, leeks, and the white part of scallions.  Grains  Wheat, including kamut, durum, and semolina. Barley and bulgur. Couscous. Wheat-based cereals. Wheat noodles, bread, crackers, and pastries.  Meats and other proteins  Fried or fatty meat. Sausage. Cashews and pistachios. Soybeans, baked beans, black beans, chickpeas, kidney beans, luaan beans, navy beans, lentils, black-eyed peas, and split peas.  Dairy  Milk, yogurt, ice cream, and soft cheese. Cream and sour cream. Milk-based sauces. Custard. Buttermilk. Soy milk.  Seasoning and other foods  Any sugar-free gum or candy. Foods that contain artificial sweeteners such as sorbitol, mannitol, isomalt, or xylitol. Foods that contain honey, high-fructose corn syrup, or agave. Bouillon, vegetable stock, beef stock, and chicken stock. Garlic and onion powder. Condiments made with onion, such as hummus, chutney, pickles, relish, salad dressing, and salsa. Tomato paste.  Beverages  Chicory-based drinks. Coffee substitutes. Chamomile tea. Fennel tea. Sweet or fortified marcus such as port or jose. Diet soft drinks made with isomalt, mannitol, maltitol, sorbitol, or xylitol. Apple, pear, and lorie juice. Juices with high-fructose corn syrup.  The items listed above may not be a complete list of foods and beverages you should avoid. Contact a dietitian for more information.  Summary  FODMAP stands for fermentable oligosaccharides, disaccharides, monosaccharides, and polyols. These are sugars that are hard for some people to digest.  A low-FODMAP eating plan is a short-term diet that helps to ease symptoms of certain bowel diseases.  The eating plan usually lasts up to 6 weeks. After that, high-FODMAP foods are reintroduced gradually and one at a time. This can help you find out which foods may be causing symptoms.  A low-FODMAP eating plan can be complicated. It is best to work with  a dietitian who has experience with this type of plan.  This information is not intended to replace advice given to you by your health care provider. Make sure you discuss any questions you have with your health care provider.  Document Revised: 05/06/2021 Document Reviewed: 05/06/2021  [employer] Patient Education © 2021 [employer]    Heartburn  Heartburn is a type of pain or discomfort that can happen in the throat or chest. It is often described as a burning pain. It may also cause a bad, acid-like taste in the mouth. Heartburn may feel worse when you lie down or bend over, and it is often worse at night. Heartburn may be caused by stomach contents that move back up into the esophagus (reflux).  Follow these instructions at home:  Eating and drinking    Avoid certain foods and drinks as told by your health care provider. This may include:  Coffee and tea, with or without caffeine.  Drinks that contain alcohol.  Energy drinks and sports drinks.  Carbonated drinks or sodas.  Chocolate and cocoa.  Peppermint and mint flavorings.  Garlic and onions.  Horseradish.  Spicy and acidic foods, including peppers, chili powder, haq powder, vinegar, hot sauces, and barbecue sauce.  Citrus fruit juices and citrus fruits, such as oranges, vladimir, and limes.  Tomato-based foods, such as red sauce, chili, salsa, and pizza with red sauce.  Fried and fatty foods, such as donuts, french fries, potato chips, and high-fat dressings.  High-fat meats, such as hot dogs and fatty cuts of red and white meats, such as rib eye steak, sausage, ham, and ramirez.  High-fat dairy items, such as whole milk, butter, and cream cheese.  Eat small, frequent meals instead of large meals.  Avoid drinking large amounts of liquid with your meals.  Avoid eating meals during the 2-3 hours before bedtime.  Avoid lying down right after you eat.  Do not exercise right after you eat.    Lifestyle         If you are overweight, reduce your weight to an amount  that is healthy for you. Ask your health care provider for guidance about a safe weight loss goal.  Do not use any products that contain nicotine or tobacco. These products include cigarettes, chewing tobacco, and vaping devices, such as e-cigarettes. These can make symptoms worse. If you need help quitting, ask your health care provider.  Wear loose-fitting clothing. Do not wear anything tight around your waist that causes pressure on your abdomen.  Raise (elevate) the head of your bed about 6 inches (15 cm) when you sleep. You can use a wedge to do this.  Try to reduce your stress, such as with yoga or meditation. If you need help reducing stress, ask your health care provider.  Medicines  Take over-the-counter and prescription medicines only as told by your health care provider.  Do not take aspirin or NSAIDs, such as ibuprofen, unless your health care provider told you to do so.  Stop medicines only as told by your health care provider. If you stop taking some medicines too quickly, your symptoms may get worse.  General instructions  Pay attention to any changes in your symptoms.  Keep all follow-up visits. This is important.  Contact a health care provider if:  You have new symptoms.  You have unexplained weight loss.  You have difficulty swallowing, or it hurts to swallow.  You have wheezing or a persistent cough.  Your symptoms do not improve with treatment.  You have frequent heartburn for more than 2 weeks.  Get help right away if:  You suddenly have pain in your arms, neck, jaw, teeth, or back.  You suddenly feel sweaty, dizzy, or light-headed.  You have chest pain or shortness of breath.  You vomit and your vomit looks like blood or coffee grounds.  Your stool is bloody or black.  These symptoms may represent a serious problem that is an emergency. Do not wait to see if the symptoms will go away. Get medical help right away. Call your local emergency services (911 in the [place] Do not drive yourself to  the hospital.  Summary  Heartburn is a type of pain or discomfort that can happen in the throat or chest. It is often described as a burning pain. It may also cause a bad, acid-like taste in the mouth.  Avoid certain foods and drinks as told by your health care provider.  Take over-the-counter and prescription medicines only as told by your health care provider. Do not take aspirin or NSAIDs, such as ibuprofen, unless your health care provider told you to do so.  Contact a health care provider if your symptoms do not improve or they get worse.  This information is not intended to replace advice given to you by your health care provider. Make sure you discuss any questions you have with your health care provider.  Document Revised: 06/23/2021 Document Reviewed: 06/23/2021  Elsevier Patient Education © 2021 [employer]

## 2022-04-01 LAB
ALBUMIN SERPL-MCNC: 4.7 G/DL (ref 3.5–5.2)
ALBUMIN/GLOB SERPL: 1.7 G/DL
ALP SERPL-CCNC: 52 U/L (ref 39–117)
ALT SERPL W P-5'-P-CCNC: 21 U/L (ref 1–33)
ANION GAP SERPL CALCULATED.3IONS-SCNC: 14 MMOL/L (ref 5–15)
AST SERPL-CCNC: 23 U/L (ref 1–32)
BILIRUB SERPL-MCNC: 0.2 MG/DL (ref 0–1.2)
BUN SERPL-MCNC: 21 MG/DL (ref 8–23)
BUN/CREAT SERPL: 23.1 (ref 7–25)
CALCIUM SPEC-SCNC: 10.6 MG/DL (ref 8.6–10.5)
CHLORIDE SERPL-SCNC: 100 MMOL/L (ref 98–107)
CO2 SERPL-SCNC: 25 MMOL/L (ref 22–29)
CREAT SERPL-MCNC: 0.91 MG/DL (ref 0.57–1)
EGFRCR SERPLBLD CKD-EPI 2021: 66.8 ML/MIN/1.73
GLOBULIN UR ELPH-MCNC: 2.7 GM/DL
GLUCOSE SERPL-MCNC: 99 MG/DL (ref 65–99)
IRON 24H UR-MRATE: 61 MCG/DL (ref 37–145)
POTASSIUM SERPL-SCNC: 4 MMOL/L (ref 3.5–5.2)
PROT SERPL-MCNC: 7.4 G/DL (ref 6–8.5)
SODIUM SERPL-SCNC: 139 MMOL/L (ref 136–145)

## 2022-04-04 PROCEDURE — 82653 EL-1 FECAL QUANTITATIVE: CPT | Performed by: NURSE PRACTITIONER

## 2022-04-04 PROCEDURE — 83993 ASSAY FOR CALPROTECTIN FECAL: CPT | Performed by: NURSE PRACTITIONER

## 2022-04-04 PROCEDURE — 87493 C DIFF AMPLIFIED PROBE: CPT | Performed by: NURSE PRACTITIONER

## 2022-04-13 ENCOUNTER — HOSPITAL ENCOUNTER (OUTPATIENT)
Dept: CT IMAGING | Facility: HOSPITAL | Age: 72
Discharge: HOME | End: 2022-04-13
Admitting: NURSE PRACTITIONER

## 2022-04-13 DIAGNOSIS — R63.4 WEIGHT LOSS: ICD-10-CM

## 2022-04-13 DIAGNOSIS — R19.7 DIARRHEA, UNSPECIFIED TYPE: ICD-10-CM

## 2022-04-13 DIAGNOSIS — D64.9 ANEMIA, UNSPECIFIED TYPE: Chronic | ICD-10-CM

## 2022-04-13 PROCEDURE — 25010000002 IOPAMIDOL 61 % SOLUTION: Performed by: NURSE PRACTITIONER

## 2022-04-13 PROCEDURE — 74177 CT ABD & PELVIS W/CONTRAST: CPT

## 2022-04-13 RX ADMIN — IOPAMIDOL 100 ML: 612 INJECTION, SOLUTION INTRAVENOUS at 15:08

## 2022-04-29 PROBLEM — D64.9 ANEMIA: Status: ACTIVE | Noted: 2022-04-29

## 2022-05-11 NOTE — PRE-PROCEDURE INSTRUCTIONS
PAT phone history completed with pt for upcoming procedure on 5/12/22 with Dr. Vinson.      PAT PASS GIVEN/REVIEWED WITH PT.  VERBALIZED UNDERSTANDING OF THE FOLLOWING:  DO NOT EAT, DRINK, SMOKE, USE SMOKELESS TOBACCO OR CHEW GUM AFTER MIDNIGHT THE NIGHT BEFORE SURGERY.  THIS ALSO INCLUDES HARD CANDIES AND MINTS.    DO NOT SHAVE THE AREA TO BE OPERATED ON AT LEAST 48 HOURS PRIOR TO THE PROCEDURE.  DO NOT WEAR MAKE UP OR NAIL POLISH.  DO NOT LEAVE IN ANY PIERCING OR WEAR JEWELRY THE DAY OF SURGERY.      DO NOT USE ADHESIVES IF YOU WEAR DENTURES.    DO NOT WEAR EYE CONTACTS; BRING IN YOUR GLASSES.    ONLY TAKE MEDICATION THE MORNING OF YOUR PROCEDURE IF INSTRUCTED BY YOUR SURGEON WITH ENOUGH WATER TO SWALLOW THE MEDICATION.  IF YOUR SURGEON DID NOT SPECIFY WHICH MEDICATIONS TO TAKE, YOU WILL NEED TO CALL THEIR OFFICE FOR FURTHER INSTRUCTIONS AND DO AS THEY INSTRUCT.    LEAVE ANYTHING YOU CONSIDER VALUABLE AT HOME.    YOU WILL NEED TO ARRANGE FOR SOMEONE TO DRIVE YOU HOME AFTER SURGERY.  IT IS RECOMMENDED THAT YOU DO NOT DRIVE, WORK, DRINK ALCOHOL OR MAKE MAJOR DECISIONS FOR AT LEAST 24 HOURS AFTER YOUR PROCEDURE IS COMPLETE.      THE DAY OF YOUR PROCEDURE, BRING IN THE FOLLOWING IF APPLICABLE:   PICTURE ID AND INSURANCE/MEDICARE OR MEDICAID CARDS/ANY CO-PAY THAT MAY BE DUE   COPY OF ADVANCED DIRECTIVE/LIVING WILL/POWER OR    CPAP/BIPAP/INHALERS   SKIN PREP SHEET   YOUR PREADMISSION TESTING PASS (IF NOT A PHONE HISTORY)           COVID self-quarantine instructions reviewed with the pt.  Verbalized understanding.

## 2022-05-12 ENCOUNTER — HOSPITAL ENCOUNTER (OUTPATIENT)
Facility: HOSPITAL | Age: 72
Setting detail: HOSPITAL OUTPATIENT SURGERY
Discharge: HOME | End: 2022-05-12
Attending: INTERNAL MEDICINE | Admitting: INTERNAL MEDICINE

## 2022-05-12 ENCOUNTER — ANESTHESIA EVENT (OUTPATIENT)
Dept: GASTROENTEROLOGY | Facility: HOSPITAL | Age: 72
End: 2022-05-12

## 2022-05-12 ENCOUNTER — ANESTHESIA (OUTPATIENT)
Dept: GASTROENTEROLOGY | Facility: HOSPITAL | Age: 72
End: 2022-05-12

## 2022-05-12 VITALS
BODY MASS INDEX: 32.61 KG/M2 | WEIGHT: 191 LBS | HEART RATE: 61 BPM | OXYGEN SATURATION: 97 % | RESPIRATION RATE: 18 BRPM | SYSTOLIC BLOOD PRESSURE: 155 MMHG | HEIGHT: 64 IN | TEMPERATURE: 97 F | DIASTOLIC BLOOD PRESSURE: 94 MMHG

## 2022-05-12 DIAGNOSIS — D64.9 ANEMIA, UNSPECIFIED TYPE: ICD-10-CM

## 2022-05-12 DIAGNOSIS — R13.19 ESOPHAGEAL DYSPHAGIA: ICD-10-CM

## 2022-05-12 DIAGNOSIS — E11.42 DIABETIC PERIPHERAL NEUROPATHY ASSOCIATED WITH TYPE 2 DIABETES MELLITUS: ICD-10-CM

## 2022-05-12 LAB — GLUCOSE BLDC GLUCOMTR-MCNC: 158 MG/DL (ref 70–130)

## 2022-05-12 PROCEDURE — 43239 EGD BIOPSY SINGLE/MULTIPLE: CPT | Performed by: INTERNAL MEDICINE

## 2022-05-12 PROCEDURE — 82962 GLUCOSE BLOOD TEST: CPT

## 2022-05-12 PROCEDURE — 25010000002 PROPOFOL 10 MG/ML EMULSION: Performed by: NURSE ANESTHETIST, CERTIFIED REGISTERED

## 2022-05-12 PROCEDURE — 88305 TISSUE EXAM BY PATHOLOGIST: CPT

## 2022-05-12 RX ORDER — SODIUM CHLORIDE 9 MG/ML
70 INJECTION, SOLUTION INTRAVENOUS CONTINUOUS PRN
Status: DISCONTINUED | OUTPATIENT
Start: 2022-05-12 | End: 2022-05-12 | Stop reason: HOSPADM

## 2022-05-12 RX ORDER — LIDOCAINE HYDROCHLORIDE 20 MG/ML
INJECTION, SOLUTION INTRAVENOUS AS NEEDED
Status: DISCONTINUED | OUTPATIENT
Start: 2022-05-12 | End: 2022-05-12 | Stop reason: SURG

## 2022-05-12 RX ORDER — PROPOFOL 10 MG/ML
VIAL (ML) INTRAVENOUS AS NEEDED
Status: DISCONTINUED | OUTPATIENT
Start: 2022-05-12 | End: 2022-05-12 | Stop reason: SURG

## 2022-05-12 RX ADMIN — PROPOFOL 50 MG: 10 INJECTION, EMULSION INTRAVENOUS at 08:14

## 2022-05-12 RX ADMIN — SODIUM CHLORIDE 70 ML/HR: 9 INJECTION, SOLUTION INTRAVENOUS at 07:46

## 2022-05-12 RX ADMIN — LIDOCAINE HYDROCHLORIDE 40 MG: 20 INJECTION, SOLUTION INTRAVENOUS at 08:07

## 2022-05-12 RX ADMIN — PROPOFOL 100 MG: 10 INJECTION, EMULSION INTRAVENOUS at 08:07

## 2022-05-12 NOTE — ANESTHESIA POSTPROCEDURE EVALUATION
Patient: Harley Rodriges    Procedure Summary     Date: 05/12/22 Room / Location: [facility] ENDOSCOPY 2 / [facility] ENDOSCOPY    Anesthesia Start: 0801 Anesthesia Stop:     Procedure: ESOPHAGOGASTRODUODENOSCOPY WITH BIOPSY (N/A Esophagus) Diagnosis:       Esophageal dysphagia      Anemia, unspecified type      (Esophageal dysphagia [R13.19])      (Anemia, unspecified type [D64.9])    Surgeons: Yuridia Vinson MD Provider: Chad Larios CRNA    Anesthesia Type: MAC ASA Status: 2          Anesthesia Type: MAC    Vitals  HR 60  Sat 98  Resp 18  /78  Temp 97.6        Post Anesthesia Care and Evaluation    Patient location during evaluation: bedside  Patient participation: complete - patient participated  Level of consciousness: awake and alert and sleepy but conscious  Pain score: 0  Pain management: adequate  Airway patency: patent  Anesthetic complications: No anesthetic complications  PONV Status: none  Cardiovascular status: acceptable  Hydration status: acceptable

## 2022-05-12 NOTE — H&P
[facility]  HISTORY AND PHYSICAL    Patient Name: Harley Rodriges  : 1950  MRN: [id number]    Chief Complaint:   For EGD    History Of Presenting Illness:    Anemia  GERD  Dysphagia  Diarrhea    Past Medical History:   Diagnosis Date   • Anxiety and depression    • Bradycardia    • Colon polyp    • COVID-19 vaccine series completed     Moderna x2, plus a booster   • Diabetes mellitus (HCC)    • Diabetic polyneuropathy (HCC)    • Disease of thyroid gland    • Ear piercing    • Elevated cholesterol    • Esophagitis    • Fibromyalgia    • Fibromyalgia    • Gastritis    • GERD (gastroesophageal reflux disease)    • History of colon polyps    • History of nuclear stress test    • Hyperlipidemia    • Hypertension    • Iron deficiency    • Lyme disease    • Problems with swallowing     food and liquids   • Rheumatoid arthritis (HCC)    • Seasonal allergies    • Sleep apnea     no CPAP   • Urinary frequency    • Wears glasses     for reading       Past Surgical History:   Procedure Laterality Date   • APPENDECTOMY         • BREAST LUMPECTOMY WITH SENTINEL NODE BIOPSY AND AXILLARY NODE DISSECTION      -benign   • CARDIAC CATHETERIZATION         • COLONOSCOPY  2016   • COLONOSCOPY N/A 01/15/2021    Procedure: COLONOSCOPY WITH BIOSPIES AND POLYPECTOMY;  Surgeon: Yuridia Vinson MD;  Location: [facility] ENDOSCOPY;  Service: Gastroenterology;  Laterality: N/A;   • COLONOSCOPY W/ BIOPSIES      Dr. Cason   • ENDOSCOPY  2016    Dr. Cason   • HYSTERECTOMY         • TONSILLECTOMY       at age 10   • UPPER GASTROINTESTINAL ENDOSCOPY     • WISDOM TOOTH EXTRACTION         Social History     Socioeconomic History   • Marital status:    Tobacco Use   • Smoking status: Never Smoker   • Smokeless tobacco: Never Used   Vaping Use   • Vaping Use: Never used   Substance and Sexual Activity   • Alcohol use: Never   • Drug use: Never   • Sexual activity: Defer        Family History   Problem Relation Age of Onset   • Heart disease Mother    • Skin cancer Mother    • Heart disease Father    • Arthritis Father    • Polymyalgia rheumatica Father    • Skin cancer Father    • Colon cancer Neg Hx    • Inflammatory bowel disease Neg Hx        Prior to Admission Medications:  Medications Prior to Admission   Medication Sig Dispense Refill Last Dose   • acetaminophen (TYLENOL) 500 MG tablet Take 500 mg by mouth Every 6 (Six) Hours As Needed for Mild Pain .   Past Week at Unknown time   • dicyclomine (BENTYL) 20 MG tablet Take 1 tablet by mouth 2 (Two) Times a Day. (Patient taking differently: Take 20 mg by mouth 2 (Two) Times a Day As Needed.) 30 tablet 1 Patient Taking Differently at Unknown time   • estradiol (ESTRACE) 0.5 MG tablet Take 0.5 mg by mouth Daily.   5/12/2022 at 0530   • gabapentin (NEURONTIN) 400 MG capsule Take 400 mg by mouth 4 (Four) Times a Day.   5/11/2022 at 0800   • hydroCHLOROthiazide (HYDRODIURIL) 25 MG tablet Take 25 mg by mouth Daily.   5/12/2022 at 0530   • loratadine (CLARITIN) 10 MG tablet Take 10 mg by mouth Daily.   5/11/2022 at 0800   • losartan (COZAAR) 25 MG tablet Take 25 mg by mouth Daily.   5/11/2022 at 0800   • metFORMIN (GLUCOPHAGE) 1000 MG tablet Take 500 mg by mouth 2 (Two) Times a Day With Meals.   5/11/2022 at 0800   • Multiple Vitamins-Minerals (ONE-A-DAY WOMENS 50+ PO) Take  by mouth.   5/11/2022 at 0800   • multivitamin with minerals tablet tablet Take 1 tablet by mouth Daily.   5/11/2022 at 0800   • Niacin, Antihyperlipidemic, 500 MG tablet Take 500 mg by mouth Daily.   5/11/2022 at 0800   • omeprazole (priLOSEC) 40 MG capsule 1 po daily in the am 30 minutes before breakfast 30 capsule 3 5/11/2022 at 0800   • PREDNISONE PO Take  by mouth Daily. States has poison ivy   5/11/2022 at 0800   • Thyroid 60 MG PO tablet Take 60 mg by mouth Daily. Total 75 mg per day   5/12/2022 at 0530   • Zinc 50 MG tablet Take 1 tablet by mouth Daily.    5/11/2022 at 0800   • magnesium gluconate 250 MG tablet tablet Take 27 mg by mouth 2 (Two) Times a Day. (Patient not taking: Reported on 5/11/2022)   Not Taking at Unknown time       Allergies:  Allergies   Allergen Reactions   • Prednisone Irritability     States on prednisone now, 5/11/22 for poison ivy        Vitals: Temp:  [97 °F (36.1 °C)] 97 °F (36.1 °C)  Heart Rate:  [66] 66  Resp:  [16] 16  BP: (148)/(109) 148/109    Review Of Systems:  Constitutional:  Negative for chills, fever, and unexpected weight change.  Respiratory:  Negative for cough, chest tightness, shortness of breath, and wheezing.  Cardiovascular:  Negative for chest pain, palpitations, and leg swelling.  Gastrointestinal:  Negative for abdominal distention, abdominal pain, Nausea, vomiting.  Neurological:  Negative for Weakness, numbness, and headaches.     Physical Exam:    General Appearance:  Alert, cooperative, in no acute distress.   Lungs:   Clear to auscultation, respirations regular, even and                 unlabored.   Heart:  Regular rhythm and normal rate.   Abdomen:   Normal bowel sounds, no masses, no organomegaly. Soft, non-tender, non-distended   Neurologic: Alert and oriented x 3. Moves all four limbs equally       Plan: ESOPHAGOGASTRODUODENOSCOPY (N/A)     Yuridia Vinson MD  5/12/2022

## 2022-05-12 NOTE — ANESTHESIA PREPROCEDURE EVALUATION
Anesthesia Evaluation     Patient summary reviewed and Nursing notes reviewed   no history of anesthetic complications:  NPO Solid Status: > 8 hours  NPO Liquid Status: > 8 hours           Airway   Mallampati: II  TM distance: >3 FB  Neck ROM: full  Possible difficult intubation  Dental - normal exam     Pulmonary - normal exam   (+) sleep apnea,   Cardiovascular - normal exam    Rhythm: regular  Rate: normal    (+) hypertension, hyperlipidemia,       Neuro/Psych  (+) numbness, psychiatric history Anxiety and Depression,    GI/Hepatic/Renal/Endo    (+) obesity,  GERD,  diabetes mellitus, thyroid problem     Musculoskeletal     (+) arthralgias, chronic pain,       ROS comment: Fibromyalgia   Abdominal  - normal exam    Abdomen: soft.  Bowel sounds: normal.   Substance History      OB/GYN          Other   arthritis, autoimmune disease rheumatoid arthritis,                      Anesthesia Plan    ASA 2     MAC   (Risks and benefits discussed including risk of aspiration, recall and dental damage. All patient questions answered. Will continue with POC.)  intravenous induction     Anesthetic plan, all risks, benefits, and alternatives have been provided, discussed and informed consent has been obtained with: patient.        CODE STATUS:

## 2022-05-12 NOTE — DISCHARGE INSTRUCTIONS
- Discharge patient to home (ambulatory).   - Low fiber small meals   - lose weight  - Reflux diet  - PPI daily  - Continue present medications.   - Await pathology results.   - Return to my office in 8 weeks.   - Repeat upper endoscopy in 6 months for surveillance.    To assist you in voiding:  Drink plenty of fluids  Listen to running water while attempting to void.    If you are unable to urinate and you have an uncomfortable urge to void or it has been   6 hours since you were discharged, return to the Emergency Room     No pushing, pulling, tugging,  heavy lifting, or strenuous activity.  No major decision making, driving, or drinking alcoholic beverages for 24 hours. ( due to the medications you have  received)  Always use good hand hygiene/washing techniques.  NO driving while taking pain medications.   no

## 2022-05-13 LAB — REF LAB TEST METHOD: NORMAL

## 2022-07-07 ENCOUNTER — OFFICE VISIT (OUTPATIENT)
Dept: GASTROENTEROLOGY | Facility: CLINIC | Age: 72
End: 2022-07-07

## 2022-07-07 VITALS
TEMPERATURE: 97.8 F | HEART RATE: 90 BPM | BODY MASS INDEX: 35.51 KG/M2 | HEIGHT: 64 IN | DIASTOLIC BLOOD PRESSURE: 94 MMHG | WEIGHT: 208 LBS | SYSTOLIC BLOOD PRESSURE: 159 MMHG

## 2022-07-07 DIAGNOSIS — R19.7 DIARRHEA, UNSPECIFIED TYPE: Chronic | ICD-10-CM

## 2022-07-07 DIAGNOSIS — R10.30 LOWER ABDOMINAL PAIN: Chronic | ICD-10-CM

## 2022-07-07 DIAGNOSIS — K21.00 GASTROESOPHAGEAL REFLUX DISEASE WITH ESOPHAGITIS WITHOUT HEMORRHAGE: Primary | Chronic | ICD-10-CM

## 2022-07-07 DIAGNOSIS — K26.9 DUODENAL ULCER: ICD-10-CM

## 2022-07-07 DIAGNOSIS — K20.80 LOS ANGELES GRADE C ESOPHAGITIS: Chronic | ICD-10-CM

## 2022-07-07 DIAGNOSIS — K31.89 EROSIVE GASTROPATHY: Chronic | ICD-10-CM

## 2022-07-07 DIAGNOSIS — R13.19 ESOPHAGEAL DYSPHAGIA: Chronic | ICD-10-CM

## 2022-07-07 DIAGNOSIS — Z86.010 PERSONAL HISTORY OF COLONIC POLYPS: ICD-10-CM

## 2022-07-07 DIAGNOSIS — E11.42 DIABETIC PERIPHERAL NEUROPATHY ASSOCIATED WITH TYPE 2 DIABETES MELLITUS: ICD-10-CM

## 2022-07-07 DIAGNOSIS — D64.9 ANEMIA, UNSPECIFIED TYPE: Chronic | ICD-10-CM

## 2022-07-07 DIAGNOSIS — K58.0 IRRITABLE BOWEL SYNDROME WITH DIARRHEA: Chronic | ICD-10-CM

## 2022-07-07 DIAGNOSIS — R63.4 WEIGHT LOSS: Chronic | ICD-10-CM

## 2022-07-07 DIAGNOSIS — K22.10 ULCER OF ESOPHAGUS WITHOUT BLEEDING: Chronic | ICD-10-CM

## 2022-07-07 PROCEDURE — 99214 OFFICE O/P EST MOD 30 MIN: CPT | Performed by: NURSE PRACTITIONER

## 2022-07-07 RX ORDER — OMEPRAZOLE 40 MG/1
CAPSULE, DELAYED RELEASE ORAL
Qty: 30 CAPSULE | Refills: 3 | Status: SHIPPED | OUTPATIENT
Start: 2022-07-07 | End: 2023-03-22

## 2022-07-07 RX ORDER — DIPHENHYDRAMINE HYDROCHLORIDE 25 MG/1
CAPSULE ORAL
COMMUNITY
Start: 2022-05-13 | End: 2023-01-13 | Stop reason: HOSPADM

## 2022-07-07 RX ORDER — SODIUM CHLORIDE 9 MG/ML
70 INJECTION, SOLUTION INTRAVENOUS CONTINUOUS PRN
Status: CANCELLED | OUTPATIENT
Start: 2022-07-07

## 2022-07-07 RX ORDER — ORGAN CONCENTRATES 80 MG
CAPSULE ORAL DAILY
COMMUNITY

## 2022-07-07 RX ORDER — DICYCLOMINE HCL 20 MG
20 TABLET ORAL 3 TIMES DAILY PRN
Qty: 30 TABLET | Refills: 1 | Status: SHIPPED | OUTPATIENT
Start: 2022-07-07

## 2022-07-07 NOTE — PROGRESS NOTES
Follow Up Note     Date: 2022   Patient Name: Harley Rodriges  MRN: [id number]  : 1950     Primary Care Provider: Bam Gemran PA     Chief Complaint   Patient presents with   • Follow-up   • Abdominal Pain     History of present illness:   2022  Harley Rodriges is a 72 y.o. female who is here today for follow up after EGD. She has been feeling some better. She has changed her diet and is following FODMAP diet and it has helped. She has found that since her fibromyalgia is worse, she is eating more than usual. Diarrhea comes and goes, she may have an episode of diarrhea 2-3 times per week now.     Interval History:  3/31/2022  Harley Rodriges is a 71 y.o. female who is here today for follow up for diarrhea.  She has a history of diarrhea for many years, but it worsened over the past 3-4 months. She is having 4-5 episodes per day. Stools are loose to watery. She is taking magnesium, but not sure if diarrhea was occurring prior to diarrhea worsening. She has not traveled or been around anyone ill. She has not taken any antibiotics. She has abdominal pain associated with bowel movements. She does drink green tea every morning.     She has been having worsening of acid reflux. She is taking Pepcid, TUMs and Pepto but it is not helping. She continues to have difficulty swallowing that has not improved.     Subjective      Past Medical History:   Diagnosis Date   • Anemia    • Anxiety and depression    • Bradycardia    • Cancer (HCC) 2019    Squamous cell nose   • Colon polyp 2016   • Coronary artery disease 2016    Bradycardia   • COVID-19 vaccine series completed     Moderna x2, plus a booster   • Diabetes mellitus (HCC)    • Diabetic polyneuropathy (HCC)    • Disease of thyroid gland    • Ear piercing    • Elevated cholesterol    • Esophagitis    • Fatty liver    • Fibromyalgia    • Fibromyalgia    • Gastritis    • GERD (gastroesophageal reflux disease)    • History of colon polyps     • History of nuclear stress test 2013   • Hyperlipidemia    • Hypertension    • Iron deficiency    • Irritable bowel syndrome 2015   • Lyme disease 2016   • Problems with swallowing     food and liquids   • Rheumatoid arthritis (HCC)    • Seasonal allergies    • Sleep apnea     no CPAP   • Ulcer 2022    Endoscopy recent   • Urinary frequency    • Wears glasses     for reading     Past Surgical History:   Procedure Laterality Date   • APPENDECTOMY      1980   • BREAST LUMPECTOMY WITH SENTINEL NODE BIOPSY AND AXILLARY NODE DISSECTION      1981-benign   • CARDIAC CATHETERIZATION      2015   • COLONOSCOPY  2016   • COLONOSCOPY N/A 01/15/2021    Procedure: COLONOSCOPY WITH BIOSPIES AND POLYPECTOMY;  Surgeon: Yuridia Vinson MD;  Location: [facility] ENDOSCOPY;  Service: Gastroenterology;  Laterality: N/A;   • COLONOSCOPY W/ BIOPSIES  2016    Dr. Cason   • ENDOSCOPY  2016    Dr. Cason   • ENDOSCOPY N/A 5/12/2022    Procedure: ESOPHAGOGASTRODUODENOSCOPY WITH BIOPSY;  Surgeon: Yuridia Vinson MD;  Location: [facility] ENDOSCOPY;  Service: Gastroenterology;  Laterality: N/A;   • HYSTERECTOMY      1980   • TONSILLECTOMY      1956 at age 10   • UPPER GASTROINTESTINAL ENDOSCOPY  2016   • WISDOM TOOTH EXTRACTION       Family History   Problem Relation Age of Onset   • Heart disease Mother    • Skin cancer Mother    • Heart disease Father    • Arthritis Father    • Polymyalgia rheumatica Father    • Skin cancer Father    • Colon cancer Neg Hx    • Inflammatory bowel disease Neg Hx      Social History     Socioeconomic History   • Marital status:    Tobacco Use   • Smoking status: Never Smoker   • Smokeless tobacco: Never Used   Vaping Use   • Vaping Use: Never used   Substance and Sexual Activity   • Alcohol use: Never   • Drug use: Never   • Sexual activity: Defer       Current Outpatient Medications:   •  acetaminophen (TYLENOL) 500 MG tablet, Take 500 mg by mouth Every 6 (Six) Hours As Needed for Mild Pain .,  Disp: , Rfl:   •  Banophen 25 MG capsule, TAKE 2 CAPSULE BY MOUTH EVERY 6 TO 8 HOURS AS NEEDED FOR ITCHING, Disp: , Rfl:   •  dicyclomine (BENTYL) 20 MG tablet, Take 1 tablet by mouth 3 (Three) Times a Day As Needed (lower abdominal pain and diarrhea)., Disp: 30 tablet, Rfl: 1  •  estradiol (ESTRACE) 0.5 MG tablet, Take 0.5 mg by mouth Daily., Disp: , Rfl:   •  Ferrous Sulfate (IRON PO), Take 1 tablet by mouth Daily., Disp: , Rfl:   •  gabapentin (NEURONTIN) 400 MG capsule, Take 400 mg by mouth 4 (Four) Times a Day., Disp: , Rfl:   •  hydroCHLOROthiazide (HYDRODIURIL) 25 MG tablet, Take 25 mg by mouth Daily., Disp: , Rfl:   •  loratadine (CLARITIN) 10 MG tablet, Take 10 mg by mouth Daily., Disp: , Rfl:   •  losartan (COZAAR) 25 MG tablet, Take 25 mg by mouth Daily., Disp: , Rfl:   •  MAGNESIUM PO, Take  by mouth Daily., Disp: , Rfl:   •  metFORMIN (GLUCOPHAGE) 1000 MG tablet, Take 500 mg by mouth 2 (Two) Times a Day With Meals. 500 mg in am, 1000 mg at night, Disp: , Rfl:   •  Misc Natural Products (Adrenal) capsule, Take  by mouth Daily., Disp: , Rfl:   •  Multiple Vitamins-Minerals (HAIR SKIN NAILS PO), Take 3 tablets by mouth Daily., Disp: , Rfl:   •  Multiple Vitamins-Minerals (ONE-A-DAY WOMENS 50+ PO), Take  by mouth., Disp: , Rfl:   •  Niacin, Antihyperlipidemic, 500 MG tablet, Take 500 mg by mouth Daily., Disp: , Rfl:   •  omeprazole (priLOSEC) 40 MG capsule, 1 po daily in the am 30 minutes before breakfast, Disp: 30 capsule, Rfl: 3  •  Thyroid 60 MG PO tablet, Take 60 mg by mouth Daily. Total 75 mg per day, Disp: , Rfl:   •  Zinc 50 MG tablet, Take 1 tablet by mouth Daily., Disp: , Rfl:      Allergies   Allergen Reactions   • Prednisone Irritability     Can take, but makes me sleepy, irritable, foggy in my head     The following portions of the patient's history were reviewed and updated as appropriate: allergies, current medications, past family history, past medical history, past social history, past  "surgical history and problem list.  Objective     Physical Exam  Vitals and nursing note reviewed.   Constitutional:       General: She is not in acute distress.     Appearance: Normal appearance. She is well-developed.   HENT:      Head: Normocephalic and atraumatic.      Mouth/Throat:      Mouth: Mucous membranes are not pale, not dry and not cyanotic.   Eyes:      General: Lids are normal.   Neck:      Trachea: Trachea normal.   Cardiovascular:      Rate and Rhythm: Normal rate.   Pulmonary:      Effort: Pulmonary effort is normal. No respiratory distress.      Breath sounds: Normal breath sounds.   Abdominal:      General: Bowel sounds are normal.      Palpations: Abdomen is soft. There is no mass.      Tenderness: There is no abdominal tenderness.      Hernia: No hernia is present.   Skin:     General: Skin is warm and dry.   Neurological:      Mental Status: She is alert and oriented to person, place, and time.   Psychiatric:         Mood and Affect: Mood normal.         Speech: Speech normal.         Behavior: Behavior normal. Behavior is cooperative.       Vitals:    22 1509   BP: 159/94   Pulse: 90   Temp: 97.8 °F (36.6 °C)   TempSrc: Infrared   Weight: 94.3 kg (208 lb)   Height: 162.6 cm (64\")     Body mass index is 35.7 kg/m².     Results Review:   I reviewed the patient's new clinical results.    Admission on 2022, Discharged on 2022   Component Date Value Ref Range Status   • Glucose 2022 158 (A) 70 - 130 mg/dL Final    Serial Number: [id number]:  [id number]   • Reference Lab Report 2022    Final                    Value:[facility]  [address] [place] [address]  Phone: [phone] or [phone]  Fax: [phone]  Jai King M.D., Medical Director    PATIENT NAME                           LABORATORY NO.  LINDA RODRIGUEZ                        [id number]  [id number]                         AGE              SEX  SSN    "        CLIENT REF #  [facility]            71      1950  F    [id number]   [id number]    [address]                REQUESTING M.D.     ATTENDING M.D.     COPY TO.  [address]                        [place] [address]                 FIDEL  DATE COLLECTED      DATE RECEIVED      DATE REPORTED  05/12/2022 05/12/2022 05/13/2022    DIAGNOSIS:  A.   SMALL BOWEL, BIOPSY:  Focal superficial mucosal erosion and foveolar metaplasia  Negative for dysplasia or carcinoma    B.   ESOPHAGUS, BIOPSY, DISTAL, MID, AND PROXIMAL:  Unremarkable squamous mucosa  Negative                           for dysplasia, carcinoma, or increased intraepithelial eosinophils    C.   GASTRIC ANTRUM, BIOPSY:  Mild chronic inactive gastritis  No H. pylori organisms identified on routine stains  Negative for intestinal metaplasia, dysplasia, or carcinoma    D.   ESOPHAGUS, BIOPSY, INLET PATCH:  Minute fragment of unremarkable squamous mucosa  Negative for dysplasia, carcinoma, or increased intraepithelial eosinophils    CLINICAL HISTORY:  Esophageal dysphagia, anemia    SPECIMENS RECEIVED:  A.  SMALL BOWEL, BIOPSY  B.  ESOPHAGUS, BIOPSY, DISTAL, MID, AND PROXIMAL  C.  GASTRIC ANTRUM, BIOPSY  D.  ESOPHAGUS, BIOPSY, INLET PATCH                          REVIEWED, DIAGNOSED AND ELECTRONICALLY  SIGNED BY:    Manjinder Horton M.D.,F.C.A.P.  CPT CODES:  88305x4        Comprehensive Metabolic Panel (03/31/2022 16:01)  Iron (03/31/2022 16:01)  Vitamin B12 (03/31/2022 16:01)  Clostridium Difficile Toxin, PCR - Stool, Per Rectum (04/04/2022 17:30)  Calprotectin, Fecal - Stool, Per Rectum (04/04/2022 17:30)  Pancreatic Elastase, Fecal - Stool, Per Rectum (04/04/2022 17:30)    Dated 2/10/2022  Hgb 11.5, Hct 34.4, Platelets 328, TSH 0.12, Magnesium 1.56, Ferritin 296    CT Abdomen Pelvis With Contrast    Result Date: 4/13/2022  No evidence of acute intra-abdominal process.   This  report was signed and finalized on 4/13/2022 3:22 PM by Devon Arvizu M.D..     Colonoscopy dated 1/15/2021 per Dr. Vinson  - 3 polyps removed.    - Diverticulosis in sigmoid colon.    - Nonbleeding internal hemorrhoids.    - Minimal rectal mucosal prolapse noted.    - Examined portion of the ileum normal.  Biopsies taken.    - Terminal ileum biopsy unremarkable.  Random colon biopsy unremarkable.  Ascending and descending colon polyp biopsies tubular adenoma, no dysplasia.  Rectal polyp biopsy with mucosal tag, no dysplasia.    EGD dated 5/12/2022 per Dr. Vinson  - Normal oropharynx.  - Z-line irregular, 36 cm from the incisors.  - LA Grade C reflux esophagitis with no bleeding. Biopsied.  - Esophageal ulcer with no stigmata of recent bleeding.  - Erosive gastropathy with stigmata of recent bleeding. Biopsied. More in favor of NSAID induced gastropathy  - Non-bleeding duodenal ulcers with no stigmata of bleeding.  - Erythematous duodenopathy.  - Normal third portion of the duodenum. Biopsied.  - Ectopic gastric mucosa in the upper third of the esophagus. Biopsied.  A.   SMALL BOWEL, BIOPSY:   Focal superficial mucosal erosion and foveolar metaplasia   Negative for dysplasia or carcinoma    B.   ESOPHAGUS, BIOPSY, DISTAL, MID, AND PROXIMAL:   Unremarkable squamous mucosa   Negative for dysplasia, carcinoma, or increased intraepithelial eosinophils    C.   GASTRIC ANTRUM, BIOPSY:   Mild chronic inactive gastritis   No H. pylori organisms identified on routine stains   Negative for intestinal metaplasia, dysplasia, or carcinoma    D.   ESOPHAGUS, BIOPSY, INLET PATCH:   Minute fragment of unremarkable squamous mucosa   Negative for dysplasia, carcinoma, or increased intraepithelial eosinophils    Assessment / Plan      1. Gastroesophageal reflux disease with esophagitis without hemorrhage  2. Heath grade C esophagitis  3. Ulcer of esophagus without bleeding.   4. Erosive gastropathy  5. Duodenal  ulcer  6. Esophageal dysphagia  Patient has a history of reflux for several years that has been doing better recently.  Difficulty swallowing is a little better.  She is taking omeprazole 40 mg daily with reasonable control.  EGD with LA grade C reflux esophagitis with no bleeding, esophageal ulcer with no stigmata of recent bleeding, and erosive gastropathy with stigmata of recent bleeding.  More in favor of NSAID induced gastropathy.  Nonbleeding duodenal ulcers noted.  Biopsies unremarkable, no H. pylori.  Antireflux measures.  Continue omeprazole 40 mg daily.  Avoid NSAIDs.  EGD in 6 months for surveillance.    - omeprazole (priLOSEC) 40 MG capsule; 1 po daily in the am 30 minutes before breakfast  Dispense: 30 capsule; Refill: 3  - Case Request; Standing  - Case Request    7. Diarrhea, unspecified type  8. Lower abdominal pain  9. Irritable bowel syndrome with diarrhea  10. Weight loss  Patient has a history of lower abdominal pain and diarrhea for several years that comes and goes.  Currently she is having an episode of diarrhea 2-3 times per week, symptoms improved after changing her diet and trying to eat low FODMAP.  No further episodes of weight loss, she has actually gained weight since her last visit according to our office scales..  Denies rectal bleeding.  She does take magnesium caplets daily for history of decreased magnesium level.  She is also on metformin 1000 mg twice a day.  Magnesium and metformin can be contributing to her diarrhea.  TSH normal. Celiac panel negative. CTAP with fatty liver, otherwise unremarkable.  Stool studies with normal fecal calprotectin, normal pancreatic elastase, negative C. Difficile. Likely IBS-D.  Avoid all dairy.   Imodium 2 mg 1/2-1 caplet 1-2 times per day as needed for diarrhea.   Bentyl 20 mg 1 po 3 times per day as needed for abdominal pain and diarrhea.    - dicyclomine (BENTYL) 20 MG tablet; Take 1 tablet by mouth 3 (Three) Times a Day As Needed (lower  abdominal pain and diarrhea).  Dispense: 30 tablet; Refill: 1    11. Anemia, unspecified type  She has a history of anemia off and on since she was a small child.  Denies GI bleeding.  Colonoscopy with polyps removed, otherwise unremarkable.  EGD with esophageal ulcer, erosive gastropathy with stigmata of recent bleeding, and nonbleeding duodenal ulcers.  Recent labs with no anemia, normal iron level.  CTAP with fatty liver, otherwise unremarkable GI tract.   EGD in 6 months for surveillance.    12. Personal history of colonic polyps  Colonoscopy in 2021 with polyps removed, 1 tubular adenoma without dysplasia.  Colonoscopy for surveillance in 2028.    13. Diabetic peripheral neuropathy associated with type 2 diabetes mellitus (HCC)    Patient Instructions   1. Antireflux measures: Avoid fried, fatty foods, alcohol, chocolate, coffee, tea,  soft drinks, peppermint and spearmint, spicy foods, tomatoes and tomato based foods, onion based foods, and smoking.   2. Other antireflux measures include weight reduction if overweight, avoiding tight clothing around the abdomen, elevating the head of the bed 6 inches with blocks under the head board, and don't drink or eat before going to bed and avoid lying down immediately after meals.   3. Omeprazole 40 mg 1 po daily 30 minutes before breakfast.  4. Bentyl 20 mg 1 po 3 times per day as needed for lower abdominal pain and diarrhea.  5. Advised to take thyroid medication first, wait 30 minutes, then take Omeprazole, wait 30 minutes, then take other medications.   6. Advised to eat a softer diet, chew food very well and take sips of water between bites.   7. Avoid all dairy.   8. Avoid all NSAIDs   9. Upper endoscopy-EGD: The indications, technique, alternatives and potential risk and complications were discussed with the patient including but not limited to bleeding, perforations, missing lesions and anesthetic complications. The patient understands and wishes to proceed with  the procedure and has given their verbal consent. Written patient education information was given to the patient.   10. The patient will call if they have further questions before procedure.   11. COVID-19 testing prior to procedure. The patient will need to self-quarantine after testing until the procedure. Instructions given to patient.     Tito Miguel, APRN  7/7/2022    Please note that portions of this note may have been completed with a voice recognition program. Efforts were made to edit the dictations, but occasionally words are mistranscribed.

## 2022-07-07 NOTE — PATIENT INSTRUCTIONS
Antireflux measures: Avoid fried, fatty foods, alcohol, chocolate, coffee, tea,  soft drinks, peppermint and spearmint, spicy foods, tomatoes and tomato based foods, onion based foods, and smoking.   Other antireflux measures include weight reduction if overweight, avoiding tight clothing around the abdomen, elevating the head of the bed 6 inches with blocks under the head board, and don't drink or eat before going to bed and avoid lying down immediately after meals.   Omeprazole 40 mg 1 po daily 30 minutes before breakfast.  Bentyl 20 mg 1 po 3 times per day as needed for lower abdominal pain and diarrhea.  Advised to take thyroid medication first, wait 30 minutes, then take Omeprazole, wait 30 minutes, then take other medications.   Advised to eat a softer diet, chew food very well and take sips of water between bites.   Avoid all dairy.   Avoid all NSAIDs   Upper endoscopy-EGD: The indications, technique, alternatives and potential risk and complications were discussed with the patient including but not limited to bleeding, perforations, missing lesions and anesthetic complications. The patient understands and wishes to proceed with the procedure and has given their verbal consent. Written patient education information was given to the patient.   The patient will call if they have further questions before procedure.   COVID-19 testing prior to procedure. The patient will need to self-quarantine after testing until the procedure. Instructions given to patient.

## 2022-07-08 DIAGNOSIS — Z01.812 PRE-PROCEDURE LAB EXAM: Primary | ICD-10-CM

## 2023-01-06 ENCOUNTER — TELEPHONE (OUTPATIENT)
Dept: GASTROENTEROLOGY | Facility: CLINIC | Age: 73
End: 2023-01-06
Payer: MEDICARE

## 2023-01-13 ENCOUNTER — ANESTHESIA (OUTPATIENT)
Dept: GASTROENTEROLOGY | Facility: HOSPITAL | Age: 73
End: 2023-01-13
Payer: MEDICARE

## 2023-01-13 ENCOUNTER — ANESTHESIA EVENT (OUTPATIENT)
Dept: GASTROENTEROLOGY | Facility: HOSPITAL | Age: 73
End: 2023-01-13
Payer: MEDICARE

## 2023-01-13 ENCOUNTER — HOSPITAL ENCOUNTER (OUTPATIENT)
Facility: HOSPITAL | Age: 73
Setting detail: HOSPITAL OUTPATIENT SURGERY
Discharge: HOME | End: 2023-01-13
Attending: INTERNAL MEDICINE | Admitting: INTERNAL MEDICINE
Payer: MEDICARE

## 2023-01-13 VITALS
SYSTOLIC BLOOD PRESSURE: 148 MMHG | HEART RATE: 59 BPM | DIASTOLIC BLOOD PRESSURE: 87 MMHG | HEIGHT: 64 IN | WEIGHT: 200 LBS | BODY MASS INDEX: 34.15 KG/M2 | TEMPERATURE: 97.6 F | RESPIRATION RATE: 16 BRPM | OXYGEN SATURATION: 96 %

## 2023-01-13 DIAGNOSIS — K20.80 LOS ANGELES GRADE C ESOPHAGITIS: ICD-10-CM

## 2023-01-13 DIAGNOSIS — E11.42 DIABETIC PERIPHERAL NEUROPATHY ASSOCIATED WITH TYPE 2 DIABETES MELLITUS: ICD-10-CM

## 2023-01-13 LAB — GLUCOSE BLDC GLUCOMTR-MCNC: 135 MG/DL (ref 70–130)

## 2023-01-13 PROCEDURE — 82962 GLUCOSE BLOOD TEST: CPT

## 2023-01-13 PROCEDURE — 25010000002 ONDANSETRON PER 1 MG: Performed by: NURSE ANESTHETIST, CERTIFIED REGISTERED

## 2023-01-13 PROCEDURE — 25010000002 PROPOFOL 10 MG/ML EMULSION: Performed by: NURSE ANESTHETIST, CERTIFIED REGISTERED

## 2023-01-13 PROCEDURE — 43239 EGD BIOPSY SINGLE/MULTIPLE: CPT | Performed by: INTERNAL MEDICINE

## 2023-01-13 PROCEDURE — 88305 TISSUE EXAM BY PATHOLOGIST: CPT

## 2023-01-13 RX ORDER — DULOXETIN HYDROCHLORIDE 30 MG/1
30 CAPSULE, DELAYED RELEASE ORAL DAILY
COMMUNITY

## 2023-01-13 RX ORDER — CELECOXIB 200 MG/1
200 CAPSULE ORAL DAILY
COMMUNITY

## 2023-01-13 RX ORDER — PROPOFOL 10 MG/ML
VIAL (ML) INTRAVENOUS AS NEEDED
Status: DISCONTINUED | OUTPATIENT
Start: 2023-01-13 | End: 2023-01-13 | Stop reason: SURG

## 2023-01-13 RX ORDER — SODIUM CHLORIDE 9 MG/ML
70 INJECTION, SOLUTION INTRAVENOUS CONTINUOUS PRN
Status: DISCONTINUED | OUTPATIENT
Start: 2023-01-13 | End: 2023-01-13 | Stop reason: HOSPADM

## 2023-01-13 RX ORDER — ONDANSETRON 2 MG/ML
INJECTION INTRAMUSCULAR; INTRAVENOUS AS NEEDED
Status: DISCONTINUED | OUTPATIENT
Start: 2023-01-13 | End: 2023-01-13 | Stop reason: SURG

## 2023-01-13 RX ORDER — LIDOCAINE HYDROCHLORIDE 20 MG/ML
INJECTION, SOLUTION INTRAVENOUS AS NEEDED
Status: DISCONTINUED | OUTPATIENT
Start: 2023-01-13 | End: 2023-01-13 | Stop reason: SURG

## 2023-01-13 RX ADMIN — PROPOFOL 50 MG: 10 INJECTION, EMULSION INTRAVENOUS at 07:41

## 2023-01-13 RX ADMIN — PROPOFOL 50 MG: 10 INJECTION, EMULSION INTRAVENOUS at 07:38

## 2023-01-13 RX ADMIN — LIDOCAINE HYDROCHLORIDE 40 MG: 20 INJECTION, SOLUTION INTRAVENOUS at 07:37

## 2023-01-13 RX ADMIN — SODIUM CHLORIDE 70 ML/HR: 9 INJECTION, SOLUTION INTRAVENOUS at 06:47

## 2023-01-13 RX ADMIN — ONDANSETRON 4 MG: 2 INJECTION INTRAMUSCULAR; INTRAVENOUS at 07:38

## 2023-01-13 RX ADMIN — PROPOFOL 100 MG: 10 INJECTION, EMULSION INTRAVENOUS at 07:37

## 2023-01-13 NOTE — ANESTHESIA PREPROCEDURE EVALUATION
Anesthesia Evaluation     Patient summary reviewed and Nursing notes reviewed   no history of anesthetic complications:  NPO Solid Status: > 8 hours  NPO Liquid Status: > 8 hours           Airway   Mallampati: II  TM distance: >3 FB  Neck ROM: full  Possible difficult intubation  Dental - normal exam     Pulmonary - normal exam   (+) sleep apnea,   Cardiovascular - normal exam    Rhythm: regular  Rate: normal    (+) hypertension, hyperlipidemia,       Neuro/Psych  (+) numbness, psychiatric history Anxiety and Depression,    GI/Hepatic/Renal/Endo    (+) obesity,  GERD,  diabetes mellitus, thyroid problem     Musculoskeletal     (+) arthralgias, chronic pain,       ROS comment: Fibromyalgia   Abdominal  - normal exam    Abdomen: soft.  Bowel sounds: normal.   Substance History      OB/GYN          Other   arthritis, autoimmune disease rheumatoid arthritis,                        Anesthesia Plan    ASA 2     MAC     (Risks and benefits discussed including risk of aspiration, recall and dental damage. All patient questions answered. Will continue with POC.)  intravenous induction     Anesthetic plan, risks, benefits, and alternatives have been provided, discussed and informed consent has been obtained with: patient.        CODE STATUS:

## 2023-01-13 NOTE — DISCHARGE INSTRUCTIONS
- Discharge patient to home (ambulatory).   - Previous diet  - Continue present medications.   - Await pathology results.   - Return to my office  To assist you in voiding:  Drink plenty of fluids  Listen to running water while attempting to void.    If you are unable to urinate and you have an uncomfortable urge to void or it has been   6 hours since you were discharged, return to the Emergency Room Please follow all post op instructions and follow up appointment time from your physician's office included in your discharge packet.  .  Please follow all post op instructions and follow up appointment time from your physician's office included in your discharge packet.  .  Rest today  No pushing,pulling,tugging,heavy lifting, or strenuous activity   No major decision making,driving,or drinking alcoholic beverages for 24 hours due to the sedation you received  Always use good hand hygiene/washing technique  No driving on pain medication.

## 2023-01-13 NOTE — ANESTHESIA POSTPROCEDURE EVALUATION
Patient: Harley Rodriges    Procedure Summary     Date: 01/13/23 Room / Location: [facility] ENDOSCOPY 1 / [facility] ENDOSCOPY    Anesthesia Start: 0729 Anesthesia Stop: 0743    Procedure: ESOPHAGOGASTRODUODENOSCOPY (Esophagus) Diagnosis:       Hugo grade C esophagitis      (Hugo grade C esophagitis [K20.80])    Surgeons: Yuridia Vinson MD Provider: Chad Larios CRNA    Anesthesia Type: MAC ASA Status: 2          Anesthesia Type: MAC    Vitals  HR 65  Sat 93  Resp 12  /81  Temp 98        Post Anesthesia Care and Evaluation    Patient location during evaluation: bedside  Patient participation: complete - patient participated  Level of consciousness: awake and alert and sleepy but conscious  Pain score: 0  Pain management: adequate    Airway patency: patent  Anesthetic complications: No anesthetic complications  PONV Status: none  Cardiovascular status: acceptable  Respiratory status: acceptable  Hydration status: acceptable

## 2023-01-13 NOTE — H&P
[facility]  HISTORY AND PHYSICAL    Patient Name: Harley Rodriges  : 1950  MRN: [id number]    Chief Complaint:   For EGD    History Of Presenting Illness:    H/ O PUD, Esophageal ulcer esophagitis for surveillance    Past Medical History:   Diagnosis Date   • Anemia    • Anxiety and depression    • Bradycardia    • Cancer (HCC) 2019    Squamous cell nose   • Colon polyp    • Coronary artery disease 2016    Bradycardia   • COVID-19 vaccine series completed     Moderna x2, plus a booster   • Diabetes mellitus (HCC)    • Diabetic polyneuropathy (HCC)    • Disease of thyroid gland    • Ear piercing    • Elevated cholesterol    • Esophagitis    • Fatty liver    • Fibromyalgia    • Fibromyalgia    • Gastritis    • GERD (gastroesophageal reflux disease)    • History of colon polyps    • History of nuclear stress test    • Hyperlipidemia    • Hypertension    • Iron deficiency    • Irritable bowel syndrome    • Lyme disease    • Problems with swallowing     food and liquids   • Rheumatoid arthritis (HCC)    • Seasonal allergies    • Sleep apnea     no CPAP   • Ulcer     Endoscopy recent   • Urinary frequency    • Wears glasses     for reading       Past Surgical History:   Procedure Laterality Date   • APPENDECTOMY         • BREAST LUMPECTOMY WITH SENTINEL NODE BIOPSY AND AXILLARY NODE DISSECTION      -benign   • CARDIAC CATHETERIZATION      2015   • COLONOSCOPY  2016   • COLONOSCOPY N/A 01/15/2021    Procedure: COLONOSCOPY WITH BIOSPIES AND POLYPECTOMY;  Surgeon: Yuridia Vinson MD;  Location: [facility] ENDOSCOPY;  Service: Gastroenterology;  Laterality: N/A;   • COLONOSCOPY W/ BIOPSIES      Dr. Cason   • ENDOSCOPY  2016    Dr. Cason   • ENDOSCOPY N/A 2022    Procedure: ESOPHAGOGASTRODUODENOSCOPY WITH BIOPSY;  Surgeon: Yuridia Vinson MD;  Location: [facility] ENDOSCOPY;  Service: Gastroenterology;  Laterality: N/A;   • HYSTERECTOMY         •  TONSILLECTOMY      1956 at age 10   • UPPER GASTROINTESTINAL ENDOSCOPY  2016   • WISDOM TOOTH EXTRACTION         Social History     Socioeconomic History   • Marital status:    Tobacco Use   • Smoking status: Never   • Smokeless tobacco: Never   Vaping Use   • Vaping Use: Never used   Substance and Sexual Activity   • Alcohol use: Never   • Drug use: Never   • Sexual activity: Defer       Family History   Problem Relation Age of Onset   • Heart disease Mother    • Skin cancer Mother    • Heart disease Father    • Arthritis Father    • Polymyalgia rheumatica Father    • Skin cancer Father    • Colon cancer Neg Hx    • Inflammatory bowel disease Neg Hx        Prior to Admission Medications:  Medications Prior to Admission   Medication Sig Dispense Refill Last Dose   • acetaminophen (TYLENOL) 500 MG tablet Take 500 mg by mouth Every 6 (Six) Hours As Needed for Mild Pain .   1/12/2023   • Banophen 25 MG capsule TAKE 2 CAPSULE BY MOUTH EVERY 6 TO 8 HOURS AS NEEDED FOR ITCHING      • celecoxib (CeleBREX) 200 MG capsule Take 200 mg by mouth Daily.   1/12/2023   • dicyclomine (BENTYL) 20 MG tablet Take 1 tablet by mouth 3 (Three) Times a Day As Needed (lower abdominal pain and diarrhea). 30 tablet 1 Past Week   • DULoxetine (CYMBALTA) 30 MG capsule Take 30 mg by mouth Daily.   1/12/2023   • estradiol (ESTRACE) 0.5 MG tablet Take 0.5 mg by mouth Daily.   1/12/2023   • Ferrous Sulfate (IRON PO) Take 1 tablet by mouth Daily.   1/12/2023   • gabapentin (NEURONTIN) 400 MG capsule Take 400 mg by mouth 4 (Four) Times a Day.      • hydroCHLOROthiazide (HYDRODIURIL) 25 MG tablet Take 25 mg by mouth Daily.   1/12/2023   • loratadine (CLARITIN) 10 MG tablet Take 10 mg by mouth Daily.   1/12/2023   • losartan (COZAAR) 25 MG tablet Take 25 mg by mouth Daily.   1/12/2023   • MAGNESIUM PO Take  by mouth Daily.   1/12/2023   • metFORMIN (GLUCOPHAGE) 1000 MG tablet Take 500 mg by mouth 2 (Two) Times a Day With Meals. 500 mg in am,  1000 mg at night   1/12/2023   • Misc Natural Products (Adrenal) capsule Take  by mouth Daily.   1/12/2023   • Multiple Vitamins-Minerals (HAIR SKIN NAILS PO) Take 3 tablets by mouth Daily.   1/12/2023   • Multiple Vitamins-Minerals (ONE-A-DAY WOMENS 50+ PO) Take  by mouth.   1/12/2023   • Niacin, Antihyperlipidemic, 500 MG tablet Take 500 mg by mouth Daily.   1/12/2023   • omeprazole (priLOSEC) 40 MG capsule 1 po daily in the am 30 minutes before breakfast 30 capsule 3 1/12/2023   • Thyroid 60 MG PO tablet Take 60 mg by mouth Daily. Total 75 mg per day   1/12/2023   • Zinc 50 MG tablet Take 1 tablet by mouth Daily.   1/12/2023       Allergies:  Allergies   Allergen Reactions   • Prednisone Irritability     Can take, but makes me sleepy, irritable, foggy in my head        Vitals: Temp:  [97.5 °F (36.4 °C)] 97.5 °F (36.4 °C)  Heart Rate:  [67] 67  Resp:  [18] 18  BP: (144)/(81) 144/81    Review Of Systems:  Constitutional:  Negative for chills, fever, and unexpected weight change.  Respiratory:  Negative for cough, chest tightness, shortness of breath, and wheezing.  Cardiovascular:  Negative for chest pain, palpitations, and leg swelling.  Gastrointestinal:  Negative for abdominal distention, abdominal pain, Nausea, vomiting.  Neurological:  Negative for Weakness, numbness, and headaches.     Physical Exam:    General Appearance:  Alert, cooperative, in no acute distress.   Lungs:   Clear to auscultation, respirations regular, even and                 unlabored.   Heart:  Regular rhythm and normal rate.   Abdomen:   Normal bowel sounds, no masses, no organomegaly. Soft, non-tender, non-distended   Neurologic: Alert and oriented x 3. Moves all four limbs equally       Plan: ESOPHAGOGASTRODUODENOSCOPY (N/A)     Yuridia Vinson MD  1/13/2023

## 2023-01-16 LAB — REF LAB TEST METHOD: NORMAL

## 2023-03-22 ENCOUNTER — OFFICE VISIT (OUTPATIENT)
Dept: GASTROENTEROLOGY | Facility: CLINIC | Age: 73
End: 2023-03-22
Payer: MEDICARE

## 2023-03-22 VITALS
HEART RATE: 83 BPM | BODY MASS INDEX: 32.95 KG/M2 | WEIGHT: 193 LBS | OXYGEN SATURATION: 96 % | SYSTOLIC BLOOD PRESSURE: 138 MMHG | DIASTOLIC BLOOD PRESSURE: 86 MMHG | HEIGHT: 64 IN

## 2023-03-22 DIAGNOSIS — K21.9 GASTROESOPHAGEAL REFLUX DISEASE WITHOUT ESOPHAGITIS: Chronic | ICD-10-CM

## 2023-03-22 DIAGNOSIS — E66.09 CLASS 1 OBESITY DUE TO EXCESS CALORIES WITH SERIOUS COMORBIDITY AND BODY MASS INDEX (BMI) OF 33.0 TO 33.9 IN ADULT: Chronic | ICD-10-CM

## 2023-03-22 DIAGNOSIS — Z86.010 PERSONAL HISTORY OF COLONIC POLYPS: ICD-10-CM

## 2023-03-22 DIAGNOSIS — D64.9 ANEMIA, UNSPECIFIED TYPE: Primary | Chronic | ICD-10-CM

## 2023-03-22 DIAGNOSIS — K58.0 IRRITABLE BOWEL SYNDROME WITH DIARRHEA: Chronic | ICD-10-CM

## 2023-03-22 DIAGNOSIS — I10 HYPERTENSION, UNSPECIFIED TYPE: ICD-10-CM

## 2023-03-22 DIAGNOSIS — Z11.59 ENCOUNTER FOR SCREENING FOR OTHER VIRAL DISEASES: ICD-10-CM

## 2023-03-22 DIAGNOSIS — K76.0 FATTY (CHANGE OF) LIVER, NOT ELSEWHERE CLASSIFIED: Chronic | ICD-10-CM

## 2023-03-22 DIAGNOSIS — R63.4 WEIGHT LOSS: Chronic | ICD-10-CM

## 2023-03-22 PROBLEM — K26.9 DUODENAL ULCER: Status: RESOLVED | Noted: 2022-07-07 | Resolved: 2023-03-22

## 2023-03-22 PROBLEM — K22.10 ULCER OF ESOPHAGUS WITHOUT BLEEDING: Status: RESOLVED | Noted: 2022-07-07 | Resolved: 2023-03-22

## 2023-03-22 PROBLEM — E66.811 CLASS 1 OBESITY DUE TO EXCESS CALORIES WITH SERIOUS COMORBIDITY AND BODY MASS INDEX (BMI) OF 33.0 TO 33.9 IN ADULT: Chronic | Status: ACTIVE | Noted: 2023-03-22

## 2023-03-22 PROBLEM — K31.89 EROSIVE GASTROPATHY: Status: RESOLVED | Noted: 2022-07-07 | Resolved: 2023-03-22

## 2023-03-22 PROBLEM — R13.19 ESOPHAGEAL DYSPHAGIA: Status: RESOLVED | Noted: 2020-12-14 | Resolved: 2023-03-22

## 2023-03-22 PROBLEM — K20.80 LOS ANGELES GRADE C ESOPHAGITIS: Chronic | Status: RESOLVED | Noted: 2022-07-07 | Resolved: 2023-03-22

## 2023-03-22 PROCEDURE — 1160F RVW MEDS BY RX/DR IN RCRD: CPT | Performed by: NURSE PRACTITIONER

## 2023-03-22 PROCEDURE — 1159F MED LIST DOCD IN RCRD: CPT | Performed by: NURSE PRACTITIONER

## 2023-03-22 PROCEDURE — 99214 OFFICE O/P EST MOD 30 MIN: CPT | Performed by: NURSE PRACTITIONER

## 2023-03-22 RX ORDER — OMEPRAZOLE 40 MG/1
40 CAPSULE, DELAYED RELEASE ORAL DAILY
COMMUNITY

## 2023-03-22 RX ORDER — DIPHENHYDRAMINE HCL 25 MG
25 CAPSULE ORAL EVERY 6 HOURS PRN
COMMUNITY

## 2023-03-22 NOTE — PATIENT INSTRUCTIONS
Antireflux measures: Avoid fried, fatty foods, alcohol, chocolate, coffee, tea,  soft drinks, peppermint and spearmint, spicy foods, tomatoes and tomato based foods, onion based foods, and smoking.   Other antireflux measures include weight reduction if overweight, avoiding tight clothing around the abdomen, elevating the head of the bed 6 inches with blocks under the head board, and don't drink or eat before going to bed and avoid lying down immediately after meals.   Omeprazole 40 mg 1 po daily 30 minutes before breakfast.  Advised to take thyroid medication first, wait 30 minutes, then take Omeprazole, wait 30 minutes, then take other medications.   Low FODMAP diet - Avoid all dairy. May use lactose free/dairy free alternatives.  Bentyl 20 mg 1 po 3 times per day as needed for lower abdominal pain.  May take Imodium 2 mg as needed for diarrhea.  Avoid all NSAIDs, including Ibuprofen, Motrin, Advil, Aleve, Naprosyn, etc.  Colonoscopy for surveillance in 2028.   Labs  CT Abdomen and pelvis  Follow up: 6 months or sooner if needed    Low-FODMAP Eating Plan  FODMAP stands for fermentable oligosaccharides, disaccharides, monosaccharides, and polyols. These are sugars that are hard for some people to digest. A low-FODMAP eating plan may help some people who have irritable bowel syndrome (IBS) and certain other bowel (intestinal) diseases to manage their symptoms.  This meal plan can be complicated to follow. Work with a diet and nutrition specialist (dietitian) to make a low-FODMAP eating plan that is right for you. A dietitian can help make sure that you get enough nutrition from this diet.  What are tips for following this plan?  Reading food labels  Check labels for hidden FODMAPs such as:  High-fructose syrup.  Honey.  Agave.  Natural fruit flavors.  Onion or garlic powder.  Choose low-FODMAP foods that contain 3-4 grams of fiber per serving.  Check food labels for serving sizes. Eat only one serving at a time to  make sure FODMAP levels stay low.  Shopping  Shop with a list of foods that are recommended on this diet and make a meal plan.  Meal planning  Follow a low-FODMAP eating plan for up to 6 weeks, or as told by your health care provider or dietitian.  To follow the eating plan:  Eliminate high-FODMAP foods from your diet completely. Choose only low-FODMAP foods to eat. You will do this for 2-6 weeks.  Gradually reintroduce high-FODMAP foods into your diet one at a time. Most people should wait a few days before introducing the next new high-FODMAP food into their meal plan. Your dietitian can recommend how quickly you may reintroduce foods.  Keep a daily record of what and how much you eat and drink. Make note of any symptoms that you have after eating.  Review your daily record with a dietitian regularly to identify which foods you can eat and which foods you should avoid.  General tips  Drink enough fluid each day to keep your urine pale yellow.  Avoid processed foods. These often have added sugar and may be high in FODMAPs.  Avoid most dairy products, whole grains, and sweeteners.  Work with a dietitian to make sure you get enough fiber in your diet.  Avoid high FODMAP foods at meals to manage symptoms.    Recommended foods  Fruits  Bananas, oranges, tangerines, vladimir, limes, blueberries, raspberries, strawberries, grapes, cantaloupe, honeydew melon, kiwi, papaya, passion fruit, and pineapple. Limited amounts of dried cranberries, banana chips, and shredded coconut.  Vegetables  Eggplant, zucchini, cucumber, peppers, green beans, bean sprouts, lettuce, arugula, kale, Swiss chard, spinach, zeferino greens, bok ramon, summer squash, potato, and tomato. Limited amounts of corn, carrot, and sweet potato. Green parts of scallions.  Grains  Gluten-free grains, such as rice, oats, buckwheat, quinoa, corn, polenta, and millet. Gluten-free pasta, bread, or cereal. Rice noodles. Corn tortillas.  Meats and other  "proteins  Unseasoned beef, pork, poultry, or fish. Eggs. Porras. Tofu (firm) and tempeh. Limited amounts of nuts and seeds, such as almonds, walnuts, brazil nuts, pecans, peanuts, nut butters, pumpkin seeds, bertin seeds, and sunflower seeds.  Dairy  Lactose-free milk, yogurt, and kefir. Lactose-free cottage cheese and ice cream. Non-dairy milks, such as almond, coconut, hemp, and rice milk. Non-dairy yogurt. Limited amounts of goat cheese, brie, mozzarella, parmesan, swiss, and other hard cheeses.  Fats and oils  Butter-free spreads. Vegetable oils, such as olive, canola, and sunflower oil.  Seasoning and other foods  Artificial sweeteners with names that do not end in \"ol,\" such as aspartame, saccharine, and stevia. Maple syrup, white table sugar, raw sugar, brown sugar, and molasses. Mayonnaise, soy sauce, and tamari. Fresh basil, coriander, parsley, rosemary, and thyme.  Beverages  Water and mineral water. Sugar-sweetened soft drinks. Small amounts of orange juice or cranberry juice. Black and green tea. Most dry marcus. Coffee.  The items listed above may not be a complete list of foods and beverages you can eat. Contact a dietitian for more information.    Foods to avoid  Fruits  Fresh, dried, and juiced forms of apple, pear, watermelon, peach, plum, cherries, apricots, blackberries, boysenberries, figs, nectarines, and lorie. Avocado.  Vegetables  Chicory root, artichoke, asparagus, cabbage, snow peas, West sprouts, broccoli, sugar snap peas, mushrooms, celery, and cauliflower. Onions, garlic, leeks, and the white part of scallions.  Grains  Wheat, including kamut, durum, and semolina. Barley and bulgur. Couscous. Wheat-based cereals. Wheat noodles, bread, crackers, and pastries.  Meats and other proteins  Fried or fatty meat. Sausage. Cashews and pistachios. Soybeans, baked beans, black beans, chickpeas, kidney beans, luana beans, navy beans, lentils, black-eyed peas, and split peas.  Dairy  Milk, yogurt, ice " cream, and soft cheese. Cream and sour cream. Milk-based sauces. Custard. Buttermilk. Soy milk.  Seasoning and other foods  Any sugar-free gum or candy. Foods that contain artificial sweeteners such as sorbitol, mannitol, isomalt, or xylitol. Foods that contain honey, high-fructose corn syrup, or agave. Bouillon, vegetable stock, beef stock, and chicken stock. Garlic and onion powder. Condiments made with onion, such as hummus, chutney, pickles, relish, salad dressing, and salsa. Tomato paste.  Beverages  Chicory-based drinks. Coffee substitutes. Chamomile tea. Fennel tea. Sweet or fortified marcus such as port or jose. Diet soft drinks made with isomalt, mannitol, maltitol, sorbitol, or xylitol. Apple, pear, and lorie juice. Juices with high-fructose corn syrup.  The items listed above may not be a complete list of foods and beverages you should avoid. Contact a dietitian for more information.  Summary  FODMAP stands for fermentable oligosaccharides, disaccharides, monosaccharides, and polyols. These are sugars that are hard for some people to digest.  A low-FODMAP eating plan is a short-term diet that helps to ease symptoms of certain bowel diseases.  The eating plan usually lasts up to 6 weeks. After that, high-FODMAP foods are reintroduced gradually and one at a time. This can help you find out which foods may be causing symptoms.  A low-FODMAP eating plan can be complicated. It is best to work with a dietitian who has experience with this type of plan.  This information is not intended to replace advice given to you by your health care provider. Make sure you discuss any questions you have with your health care provider.  Document Revised: 05/06/2021 Document Reviewed: 05/06/2021  [employer] Patient Education © 2022 [employer]

## 2023-03-23 ENCOUNTER — LAB (OUTPATIENT)
Dept: LAB | Facility: HOSPITAL | Age: 73
End: 2023-03-23
Payer: MEDICARE

## 2023-03-23 PROCEDURE — 85610 PROTHROMBIN TIME: CPT | Performed by: NURSE PRACTITIONER

## 2023-03-23 PROCEDURE — 86704 HEP B CORE ANTIBODY TOTAL: CPT | Performed by: NURSE PRACTITIONER

## 2023-03-23 PROCEDURE — 87340 HEPATITIS B SURFACE AG IA: CPT | Performed by: NURSE PRACTITIONER

## 2023-03-23 PROCEDURE — 82247 BILIRUBIN TOTAL: CPT | Performed by: NURSE PRACTITIONER

## 2023-03-23 PROCEDURE — 86708 HEPATITIS A ANTIBODY: CPT | Performed by: NURSE PRACTITIONER

## 2023-03-23 PROCEDURE — 82465 ASSAY BLD/SERUM CHOLESTEROL: CPT | Performed by: NURSE PRACTITIONER

## 2023-03-23 PROCEDURE — 84450 TRANSFERASE (AST) (SGOT): CPT | Performed by: NURSE PRACTITIONER

## 2023-03-23 PROCEDURE — 83540 ASSAY OF IRON: CPT | Performed by: NURSE PRACTITIONER

## 2023-03-23 PROCEDURE — 83010 ASSAY OF HAPTOGLOBIN QUANT: CPT | Performed by: NURSE PRACTITIONER

## 2023-03-23 PROCEDURE — 83883 ASSAY NEPHELOMETRY NOT SPEC: CPT | Performed by: NURSE PRACTITIONER

## 2023-03-23 PROCEDURE — 86317 IMMUNOASSAY INFECTIOUS AGENT: CPT | Performed by: NURSE PRACTITIONER

## 2023-03-23 PROCEDURE — 84478 ASSAY OF TRIGLYCERIDES: CPT | Performed by: NURSE PRACTITIONER

## 2023-03-23 PROCEDURE — 82947 ASSAY GLUCOSE BLOOD QUANT: CPT | Performed by: NURSE PRACTITIONER

## 2023-03-23 PROCEDURE — 86803 HEPATITIS C AB TEST: CPT | Performed by: NURSE PRACTITIONER

## 2023-03-23 PROCEDURE — 82172 ASSAY OF APOLIPOPROTEIN: CPT | Performed by: NURSE PRACTITIONER

## 2023-03-23 PROCEDURE — 85027 COMPLETE CBC AUTOMATED: CPT | Performed by: NURSE PRACTITIONER

## 2023-03-23 PROCEDURE — 82728 ASSAY OF FERRITIN: CPT | Performed by: NURSE PRACTITIONER

## 2023-03-23 PROCEDURE — 84460 ALANINE AMINO (ALT) (SGPT): CPT | Performed by: NURSE PRACTITIONER

## 2023-03-23 PROCEDURE — 82607 VITAMIN B-12: CPT | Performed by: NURSE PRACTITIONER

## 2023-03-23 PROCEDURE — 82977 ASSAY OF GGT: CPT | Performed by: NURSE PRACTITIONER

## 2023-03-23 PROCEDURE — 84466 ASSAY OF TRANSFERRIN: CPT | Performed by: NURSE PRACTITIONER

## 2023-03-27 DIAGNOSIS — D64.9 ANEMIA, UNSPECIFIED TYPE: Primary | ICD-10-CM

## 2023-04-27 ENCOUNTER — LAB (OUTPATIENT)
Dept: LAB | Facility: HOSPITAL | Age: 73
End: 2023-04-27
Payer: MEDICARE

## 2023-04-27 ENCOUNTER — CONSULT (OUTPATIENT)
Dept: ONCOLOGY | Facility: CLINIC | Age: 73
End: 2023-04-27
Payer: MEDICARE

## 2023-04-27 VITALS
HEIGHT: 64 IN | WEIGHT: 185 LBS | SYSTOLIC BLOOD PRESSURE: 120 MMHG | DIASTOLIC BLOOD PRESSURE: 59 MMHG | TEMPERATURE: 97.8 F | HEART RATE: 106 BPM | OXYGEN SATURATION: 98 % | RESPIRATION RATE: 16 BRPM | BODY MASS INDEX: 31.58 KG/M2

## 2023-04-27 DIAGNOSIS — D64.9 ANEMIA, UNSPECIFIED TYPE: ICD-10-CM

## 2023-04-27 DIAGNOSIS — D64.9 ANEMIA, UNSPECIFIED TYPE: Primary | Chronic | ICD-10-CM

## 2023-04-27 LAB
ALBUMIN SERPL-MCNC: 4.6 G/DL (ref 3.5–5.2)
ALBUMIN/GLOB SERPL: 1.8 G/DL
ALP SERPL-CCNC: 43 U/L (ref 39–117)
ALT SERPL W P-5'-P-CCNC: 10 U/L (ref 1–33)
ANION GAP SERPL CALCULATED.3IONS-SCNC: 12 MMOL/L (ref 5–15)
AST SERPL-CCNC: 17 U/L (ref 1–32)
B2 MICROGLOB SERPL-MCNC: 3.4 MG/L (ref 0.8–2.2)
BASOPHILS # BLD AUTO: 0.03 10*3/MM3 (ref 0–0.2)
BASOPHILS NFR BLD AUTO: 0.4 % (ref 0–1.5)
BILIRUB SERPL-MCNC: 0.2 MG/DL (ref 0–1.2)
BUN SERPL-MCNC: 20 MG/DL (ref 8–23)
BUN/CREAT SERPL: 18.5 (ref 7–25)
CALCIUM SPEC-SCNC: 10.1 MG/DL (ref 8.6–10.5)
CHLORIDE SERPL-SCNC: 98 MMOL/L (ref 98–107)
CO2 SERPL-SCNC: 25 MMOL/L (ref 22–29)
CREAT SERPL-MCNC: 1.08 MG/DL (ref 0.57–1)
DEPRECATED RDW RBC AUTO: 39.6 FL (ref 37–54)
EGFRCR SERPLBLD CKD-EPI 2021: 54 ML/MIN/1.73
EOSINOPHIL # BLD AUTO: 0.22 10*3/MM3 (ref 0–0.4)
EOSINOPHIL NFR BLD AUTO: 3.3 % (ref 0.3–6.2)
ERYTHROCYTE [DISTWIDTH] IN BLOOD BY AUTOMATED COUNT: 12.6 % (ref 12.3–15.4)
FOLATE SERPL-MCNC: >20 NG/ML (ref 4.78–24.2)
GLOBULIN UR ELPH-MCNC: 2.6 GM/DL
GLUCOSE SERPL-MCNC: 133 MG/DL (ref 65–99)
HCT VFR BLD AUTO: 34.5 % (ref 34–46.6)
HGB BLD-MCNC: 11.7 G/DL (ref 12–15.9)
IMM GRANULOCYTES # BLD AUTO: 0.01 10*3/MM3 (ref 0–0.05)
IMM GRANULOCYTES NFR BLD AUTO: 0.1 % (ref 0–0.5)
LDH SERPL-CCNC: 129 U/L (ref 135–214)
LYMPHOCYTES # BLD AUTO: 2.78 10*3/MM3 (ref 0.7–3.1)
LYMPHOCYTES NFR BLD AUTO: 41.4 % (ref 19.6–45.3)
MCH RBC QN AUTO: 29.6 PG (ref 26.6–33)
MCHC RBC AUTO-ENTMCNC: 33.9 G/DL (ref 31.5–35.7)
MCV RBC AUTO: 87.3 FL (ref 79–97)
MONOCYTES # BLD AUTO: 0.48 10*3/MM3 (ref 0.1–0.9)
MONOCYTES NFR BLD AUTO: 7.1 % (ref 5–12)
NEUTROPHILS NFR BLD AUTO: 3.2 10*3/MM3 (ref 1.7–7)
NEUTROPHILS NFR BLD AUTO: 47.7 % (ref 42.7–76)
NRBC BLD AUTO-RTO: 0 /100 WBC (ref 0–0.2)
PATHOLOGY REVIEW: YES
PLATELET # BLD AUTO: 295 10*3/MM3 (ref 140–450)
PMV BLD AUTO: 8.8 FL (ref 6–12)
POTASSIUM SERPL-SCNC: 4.2 MMOL/L (ref 3.5–5.2)
PROT SERPL-MCNC: 7.2 G/DL (ref 6–8.5)
RBC # BLD AUTO: 3.95 10*6/MM3 (ref 3.77–5.28)
RETICS # AUTO: 0.05 10*6/MM3 (ref 0.02–0.13)
RETICS/RBC NFR AUTO: 1.3 % (ref 0.7–1.9)
SODIUM SERPL-SCNC: 135 MMOL/L (ref 136–145)
VIT B12 BLD-MCNC: 555 PG/ML (ref 211–946)
WBC NRBC COR # BLD: 6.72 10*3/MM3 (ref 3.4–10.8)

## 2023-04-27 PROCEDURE — 82232 ASSAY OF BETA-2 PROTEIN: CPT

## 2023-04-27 PROCEDURE — 83521 IG LIGHT CHAINS FREE EACH: CPT

## 2023-04-27 PROCEDURE — 85025 COMPLETE CBC W/AUTO DIFF WBC: CPT

## 2023-04-27 PROCEDURE — 82746 ASSAY OF FOLIC ACID SERUM: CPT

## 2023-04-27 PROCEDURE — 83615 LACTATE (LD) (LDH) ENZYME: CPT

## 2023-04-27 PROCEDURE — 99204 OFFICE O/P NEW MOD 45 MIN: CPT | Performed by: INTERNAL MEDICINE

## 2023-04-27 PROCEDURE — 1125F AMNT PAIN NOTED PAIN PRSNT: CPT | Performed by: INTERNAL MEDICINE

## 2023-04-27 PROCEDURE — 82607 VITAMIN B-12: CPT

## 2023-04-27 PROCEDURE — 82784 ASSAY IGA/IGD/IGG/IGM EACH: CPT

## 2023-04-27 PROCEDURE — 85045 AUTOMATED RETICULOCYTE COUNT: CPT

## 2023-04-27 PROCEDURE — 84165 PROTEIN E-PHORESIS SERUM: CPT

## 2023-04-27 PROCEDURE — 86334 IMMUNOFIX E-PHORESIS SERUM: CPT

## 2023-04-27 PROCEDURE — 80053 COMPREHEN METABOLIC PANEL: CPT

## 2023-04-27 PROCEDURE — 36415 COLL VENOUS BLD VENIPUNCTURE: CPT

## 2023-04-27 RX ORDER — LOSARTAN POTASSIUM 50 MG/1
50 TABLET ORAL DAILY
COMMUNITY
Start: 2023-04-12

## 2023-04-27 RX ORDER — FERROUS SULFATE TAB EC 324 MG (65 MG FE EQUIVALENT) 324 (65 FE) MG
324 TABLET DELAYED RESPONSE ORAL
COMMUNITY

## 2023-04-27 NOTE — PROGRESS NOTES
New Patient Office Visit      Date: 2023     Patient Name: Harley Rodriges  MRN: [id number]  : 1950  Referring Physician: Bam German    Chief Complaint: Establish care for anemia    History of Present Illness: Harley Rodriges is a pleasant 72 y.o. female with a past medical history of fibromyalgia, Lyme disease, iron deficiency, type 2 diabetes, hypertension, hypothyroidism who presents today for evaluation of anemia. The patient is accompanied by their friend who contributes to the history of their care.  Patient has been followed by the PCP as well as GI where she had labs checked last month which were notable for mild anemia with a hemoglobin of 11.3.  Iron studies were within normal limits and vitamin B12 was slightly elevated.  Per patient, she takes ferrous sulfate 3 times daily.  Denies any abdominal pain or discomfort.  Does note some dark stools related to the medication.  She has noted over the past 3 months that she is having significantly worsening fatigue, tiredness, and dizzy spells which appear worse when getting up to ambulate.  She has had to cut back on work and has had diminished quality of life as a result of this.    Oncology History:    Oncology/Hematology History    No history exists.       Subjective      Review of Systems:     Constitutional: Negative for fevers, chills, or weight loss  Eyes: Negative for blurred vision or discharge         Ear/Nose/Throat: Negative for difficulty swallowing, sore throat, LAD                                                       Respiratory: Negative for cough, SOA, wheezing                                                                                        Cardiovascular: Negative for chest pain or palpitations                                                                  Gastrointestinal: Negative for nausea, vomiting or diarrhea                                                                     Genitourinary: Negative for dysuria  or hematuria                                                                                           Musculoskeletal: Negative for any joint pains or muscle aches                                                                        Neurologic: Negative for any weakness, headaches, dizziness                                                                         Hematologic: Negative for any easy bleeding or bruising                                                                                   Psychiatric: Negative for anxiety or depression                             Past Medical History:   Past Medical History:   Diagnosis Date   • Anemia 1966   • Anxiety and depression    • Bradycardia    • Cancer 2019    Squamous cell nose   • Colon polyp 2016   • Coronary artery disease 2016    Bradycardia   • COVID-19 vaccine series completed     Moderna x2, plus a booster   • Diabetes mellitus    • Diabetic polyneuropathy    • Disease of thyroid gland    • Duodenal ulcer 7/7/2022   • Ear piercing    • Elevated cholesterol    • Erosive gastropathy 7/7/2022   • Esophagitis    • Fatty liver 2010   • Fibromyalgia    • Fibromyalgia    • Gastritis    • GERD (gastroesophageal reflux disease)    • History of colon polyps    • History of nuclear stress test 2013   • Hyperlipidemia    • Hypertension    • Iron deficiency    • Irritable bowel syndrome 2015   • [place] grade C esophagitis 7/7/2022   • Lyme disease 2016   • Problems with swallowing     food and liquids   • Rheumatoid arthritis    • Seasonal allergies    • Sleep apnea     no CPAP   • Ulcer 2022    Endoscopy recent   • Ulcer of esophagus without bleeding 7/7/2022   • Urinary frequency    • Wears glasses     for reading       Past Surgical History:   Past Surgical History:   Procedure Laterality Date   • APPENDECTOMY      1980   • BREAST LUMPECTOMY WITH SENTINEL NODE BIOPSY AND AXILLARY NODE DISSECTION      1981-benign   • CARDIAC CATHETERIZATION      2015   •  COLONOSCOPY  2016   • COLONOSCOPY N/A 01/15/2021    Procedure: COLONOSCOPY WITH BIOSPIES AND POLYPECTOMY;  Surgeon: Yuridia Vinson MD;  Location: [facility] ENDOSCOPY;  Service: Gastroenterology;  Laterality: N/A;   • COLONOSCOPY W/ BIOPSIES  2016    Dr. Cason   • ENDOSCOPY  2016    Dr. Cason   • ENDOSCOPY N/A 5/12/2022    Procedure: ESOPHAGOGASTRODUODENOSCOPY WITH BIOPSY;  Surgeon: Yuridia Vinson MD;  Location: [facility] ENDOSCOPY;  Service: Gastroenterology;  Laterality: N/A;   • ENDOSCOPY N/A 1/13/2023    Procedure: ESOPHAGOGASTRODUODENOSCOPY, biopsy;  Surgeon: Yuridia Vinson MD;  Location: [facility] ENDOSCOPY;  Service: Gastroenterology;  Laterality: N/A;   • HYSTERECTOMY      1980   • TONSILLECTOMY      1956 at age 10   • UPPER GASTROINTESTINAL ENDOSCOPY  2016   • WISDOM TOOTH EXTRACTION         Family History:   Family History   Problem Relation Age of Onset   • Heart disease Mother    • Skin cancer Mother    • Heart disease Father    • Arthritis Father    • Polymyalgia rheumatica Father    • Skin cancer Father    • Colon cancer Neg Hx    • Inflammatory bowel disease Neg Hx        Social History:   Social History     Socioeconomic History   • Marital status:    Tobacco Use   • Smoking status: Never   • Smokeless tobacco: Never   Vaping Use   • Vaping Use: Never used   Substance and Sexual Activity   • Alcohol use: Never   • Drug use: Never   • Sexual activity: Defer       Medications:     Current Outpatient Medications:   •  acetaminophen (TYLENOL) 500 MG tablet, Take 1 tablet by mouth Every 6 (Six) Hours As Needed for Mild Pain., Disp: , Rfl:   •  celecoxib (CeleBREX) 200 MG capsule, Take 1 capsule by mouth Daily., Disp: , Rfl:   •  dicyclomine (BENTYL) 20 MG tablet, Take 1 tablet by mouth 3 (Three) Times a Day As Needed (lower abdominal pain and diarrhea). (Patient taking differently: Take 1 tablet by mouth As Needed (lower abdominal pain and diarrhea).), Disp: 30 tablet, Rfl: 1  •   "diphenhydrAMINE (BENADRYL) 25 mg capsule, Take 1 capsule by mouth Every 6 (Six) Hours As Needed for Itching., Disp: , Rfl:   •  DULoxetine (CYMBALTA) 30 MG capsule, Take 1 capsule by mouth Daily., Disp: , Rfl:   •  estradiol (ESTRACE) 0.5 MG tablet, Take 1 tablet by mouth Daily., Disp: , Rfl:   •  ferrous sulfate 324 (65 Fe) MG tablet delayed-release EC tablet, Take 1 tablet by mouth Daily With Breakfast., Disp: , Rfl:   •  hydroCHLOROthiazide (HYDRODIURIL) 25 MG tablet, Take 1 tablet by mouth Daily., Disp: , Rfl:   •  losartan (COZAAR) 50 MG tablet, Take 1 tablet by mouth Daily., Disp: , Rfl:   •  MAGNESIUM CITRATE PO, Take 250 mg by mouth., Disp: , Rfl:   •  metFORMIN (GLUCOPHAGE) 1000 MG tablet, Take 500 mg by mouth 2 (Two) Times a Day With Meals. 500 mg in am, 1000 mg at night, Disp: , Rfl:   •  Misc Natural Products (Adrenal) capsule, Take  by mouth Daily., Disp: , Rfl:   •  Multiple Vitamins-Minerals (HAIR SKIN NAILS PO), Take 3 tablets by mouth Daily., Disp: , Rfl:   •  Multiple Vitamins-Minerals (ONE-A-DAY WOMENS 50+ PO), Take  by mouth., Disp: , Rfl:   •  Niacin, Antihyperlipidemic, 500 MG tablet, Take 1 tablet by mouth Daily., Disp: , Rfl:   •  Thyroid 60 MG PO tablet, Take 1 tablet by mouth Daily. Total 75 mg per day, Disp: , Rfl:   •  TRAZODONE HCL PO, Take  by mouth Every Night., Disp: , Rfl:   •  vitamin D3 125 MCG (5000 UT) capsule capsule, Take 1 capsule by mouth Daily., Disp: , Rfl:   •  Zinc 50 MG tablet, Take 1 tablet by mouth Daily., Disp: , Rfl:     Allergies:   Allergies   Allergen Reactions   • Prednisone Irritability     Can take, but makes me sleepy, irritable, foggy in my head       Objective     Physical Exam:  Vital Signs:   Vitals:    04/27/23 1132   BP: 120/59   Pulse: 106   Resp: 16   Temp: 97.8 °F (36.6 °C)   TempSrc: Temporal   SpO2: 98%   Weight: 83.9 kg (185 lb)   Height: 162.6 cm (64\")   PainSc:   2   PainLoc: Hip     Pain Score    04/27/23 1132   PainSc:   2   PainLoc: Hip "     ECOG Performance Status: 2 - Symptomatic, <50% confined to bed    Constitutional: NAD, ECOG 2  Eyes: PERRLA, scleral anicteric  ENT: No LAD, no thyromegaly  Respiratory: CTAB, no wheezing, rales, rhonchi  Cardiovascular: RRR, no murmurs, pulses 2+ bilaterally  Abdomen: soft, NT/ND, no HSM  Musculoskeletal: strength 5/5 bilaterally, no c/c/e  Neurologic: A&O x 3, CN II-XII intact grossly  Psych: mood and affect congruent, no SI or HI    Results Review:   No visits with results within 2 Week(s) from this visit.   Latest known visit with results is:   Results Encounter on 03/22/2023   Component Date Value Ref Range Status   • WBC 03/23/2023 5.14  3.40 - 10.80 10*3/mm3 Final   • RBC 03/23/2023 3.88  3.77 - 5.28 10*6/mm3 Final   • Hemoglobin 03/23/2023 11.3 (L)  12.0 - 15.9 g/dL Final   • Hematocrit 03/23/2023 33.8 (L)  34.0 - 46.6 % Final   • MCV 03/23/2023 87.1  79.0 - 97.0 fL Final   • MCH 03/23/2023 29.1  26.6 - 33.0 pg Final   • MCHC 03/23/2023 33.4  31.5 - 35.7 g/dL Final   • RDW 03/23/2023 12.5  12.3 - 15.4 % Final   • RDW-SD 03/23/2023 39.4  37.0 - 54.0 fl Final   • MPV 03/23/2023 8.7  6.0 - 12.0 fL Final   • Platelets 03/23/2023 312  140 - 450 10*3/mm3 Final   • Iron 03/23/2023 92  37 - 145 mcg/dL Final   • Iron Saturation 03/23/2023 23  20 - 50 % Final   • Transferrin 03/23/2023 271  200 - 360 mg/dL Final   • TIBC 03/23/2023 404  298 - 536 mcg/dL Final   • Ferritin 03/23/2023 317.00 (H)  13.00 - 150.00 ng/mL Final   • Vitamin B-12 03/23/2023 1,315 (H)  211 - 946 pg/mL Final   • Protime 03/23/2023 12.7  12.5 - 14.5 Seconds Final   • INR 03/23/2023 0.93  0.90 - 1.10 Final   • Hep A Total Ab 03/23/2023 Positive (A)  Negative Final   • Hepatitis B Surface Ab Quant 03/23/2023 <3.1 (L)  Immunity>9.9 mIU/mL Final      Status of Immunity                     Anti-HBs Level    ------------------                     --------------  Inconsistent with Immunity                   0.0 - 9.9  Consistent with Immunity                           >9.9   • Hepatitis B Surface Ag 03/23/2023 Non-Reactive  Non-Reactive Final   • Hep B Core Total Ab 03/23/2023 Negative  Negative Final   • Hepatitis C Ab 03/23/2023 Non-Reactive  Non-Reactive Final   • Fibrosis Score (References) 03/23/2023 0.10  0.00 - 0.21 Final   • Fibrosis Stage (Reference) 03/23/2023 Comment   Final                       F0 - No fibrosis   • Steatosis Score (Reference) 03/23/2023 0.65 (H)  0.00 - 0.30 Final   • Steatosis Grade (Reference) 03/23/2023 Comment   Final    S2 - Moderate Steatosis   • RAO Score (Reference) 03/23/2023 0.75 (H)  0.25 Final   • Rao Grade (Reference) 03/23/2023 Comment   Final                           N2 - RAO   • Height: (Reference) 03/23/2023 64  in Final   • Weight: (Reference) 03/23/2023 192  LBS Final   • Alpha 2-Macroglobulins, Qn 03/23/2023 223  110 - 276 mg/dL Final   • Haptoglobin 03/23/2023 131  42 - 346 mg/dL Final   • Apolipoprotein A-1 03/23/2023 171  116 - 209 mg/dL Final   • Total Bilirubin 03/23/2023 0.1  0.0 - 1.2 mg/dL Final   • GGT 03/23/2023 29  0 - 60 IU/L Final   • ALT (SGPT) 03/23/2023 16  0 - 40 IU/L Final   • AST (SGOT) P5P (Reference) 03/23/2023 23  0 - 40 IU/L Final   • Cholesterol, Total (Reference) 03/23/2023 225 (H)  100 - 199 mg/dL Final   • Glucose, Serum (Reference) 03/23/2023 133 (H)  70 - 99 mg/dL Final   • Triglycerides 03/23/2023 225 (H)  0 - 149 mg/dL Final   • Interpretations: (Reference) 03/23/2023 Comment   Final    Comment: Quantitative results of 10 biochemicals in combination with  age, gender, height, and weight, are analyzed using a  computational algorithm to provide a quantitative surrogate  marker (0.0-1.0) of liver fibrosis (Metavir F0-F4), hepatic  steatosis (0.0-1.0, S0-S3), and Non-Alcoholic Steato-  Hepatitis (RAO) (0.0-0.75, N0-N2). The absence of steatosis  (S<0.38) precludes the diagnosis of RAO.  Fibrosis marker:  In a study of 171 Non-Alcoholic Fatty  Liver Disease (NAFLD) patients where  23% had significant  NAFLD fibrosis (Metavir F2-F4) and 11% had cirrhosis by  liver biopsy, a fibrosis result of >0.3 yielded a  sensitivity of 83% and a specificity of 78% for the  detection of significant fibrosis(1).  Steatosis Marker:  In a population of 744 patients (583 HCV,  18 HBV, 69 NAFLD, and 74 alcoholic disease patients), where  36% had significant steatosis (>5%) on a liver biopsy, a  steatosis score >0.5 had a sensitivity of 71% and a  specificity of 72% for identification of                            significant  steatosis(2).  RAO marker:  In a population of 257 NAFLD patients, where  62% had at least some RAO by liver biopsy, a prediction of  RAO had a sensitivity of 88% for identifying RAO and a  specificity of 50%(3).   • Fibrosis Scorin2023 Comment   Final         <=0.21 = Stage F0 - No fibrosis  0.21 - 0.27 = Stage F0 - F1  0.27 - 0.31 = Stage F1 - Portal fibrosis  0.31 - 0.48 = Stage F1 - F2  0.48 - 0.58 = Stage F2 - Bridging fibrosis with few septa  0.58 - 0.72 = Stage F3 - Bridging fibrosis with many septa  0.72 - 0.74 = Stage F3 - F4        >0.74 = Stage F4 - Cirrhosis   • Steatosis Grading (Reference) 2023 Comment   Final          < 0.30 = S0 - No Steatosis  0.30 to 0.38 = S0 - S1  0.38 to 0.48 = S1 - Minimal Steatosis  0.48 to 0.57 = S1 - S2  0.57 to 0.67 = S2 - Moderate Steatosis  0.67 to 0.69 = S2 - S3        > 0.69 = S3 - Marked or Severe Steatosis   • Rao Scoring (Reference) 2023 Comment   Final    0.25 = N0 - Not RAO  0.50 = N1 - Borderline or probable RAO  0.75 = N2 - RAO   • Limitations: (Reference) 2023 Comment   Final    RAO FibroSure is recommended for patients with suspected non-  alcoholic fatty liver disease.  It is not recommended for patients  with other liver diseases.  It is also not recommended in patients  with Gilbert Disease, acute hemolysis, acute viral hepatitis, drug  induced hepatitis, genetic liver disease, autoimmune  hepatitis and/or  extra-hepatic cholestasis.  Any of these clinical situations may lead  to inaccurate quantitative predictions of fibrosis.   • Comment (Reference) 03/23/2023 Comment   Final    Comment: This test was developed and its performance characteristics determined  by [employer].  It has not been cleared or approved by the Food and Drug  Administration.  The FDA has determined that such clearance or  approval is not necessary.  For questions regarding this report please contact  customer service at [phone].  References:  1. Jonathon CENTENO et al. Diagnostic Value of Biochemical Markers     (FibroTest) for the prediction of Liver Fibrosis in     patients with Non-Alcoholic Fatty Liver Disease. [facility] 2006; 6:6.  2. CRYSTAL Barksdale. et al. The Diagnostic Value of Biomarkers     (Steato Test) for the Prediction of Liver Steatosis.     Comparative Hepatol. 2005; 4:10.  3. CRYSTAL Barksdale, Cally Holt, et al. Diagnostic value     of biochemical markers (RAO TEST) for the prediction of     non alcohol steato hepatitis in patients with non-     alcoholic fatty liver disease. [facility]MC Gastroenterology 2006;     6:34 doi:10.1186/0788-603T-8-34.  **Effective June 19, 2023                            RAO FibroSure will be made non-orderable.**    [employer] offers [id number] RAO FibroSure(R) Plus.       No results found.    Assessment / Plan      Assessment/Plan:   1. Anemia, unspecified type (Primary)  -Unclear etiology at this time  -Hemoglobin 11.3 in March 2023  -Iron studies in March 2023 within normal limits.  Patient currently taking oral iron  -We will check labs as below [place] secondary causes  -     CBC & Differential; Future  -     Comprehensive Metabolic Panel; Future  -     Lactate Dehydrogenase; Future  -     Folate; Future  -     Vitamin B12; Future  -     Reticulocytes; Future  -     Peripheral Blood Smear; Future  -     Beta 2 Microglobulin, Serum; Future  -     RODRICK + PE; Future  -      Immunoglobulin Free LT Chains Blood; Future           Follow Up:   Follow-up in 3 weeks     Elmer Pacheco MD  Hematology and Oncology     Please note that portions of this note may have been completed with a voice recognition program. Efforts were made to edit the dictations, but occasionally words are mistranscribed.

## 2023-04-28 LAB
ALBUMIN SERPL ELPH-MCNC: 4 G/DL (ref 2.9–4.4)
ALBUMIN/GLOB SERPL: 1.4 {RATIO} (ref 0.7–1.7)
ALPHA1 GLOB SERPL ELPH-MCNC: 0.2 G/DL (ref 0–0.4)
ALPHA2 GLOB SERPL ELPH-MCNC: 0.8 G/DL (ref 0.4–1)
B-GLOBULIN SERPL ELPH-MCNC: 0.9 G/DL (ref 0.7–1.3)
GAMMA GLOB SERPL ELPH-MCNC: 1.1 G/DL (ref 0.4–1.8)
GLOBULIN SER-MCNC: 3 G/DL (ref 2.2–3.9)
IGA SERPL-MCNC: 45 MG/DL (ref 64–422)
IGG SERPL-MCNC: 1206 MG/DL (ref 586–1602)
IGM SERPL-MCNC: 70 MG/DL (ref 26–217)
INTERPRETATION SERPL IEP-IMP: ABNORMAL
KAPPA LC FREE SER-MCNC: 42.7 MG/L (ref 3.3–19.4)
KAPPA LC FREE/LAMBDA FREE SER: 2.09 {RATIO} (ref 0.26–1.65)
LAB AP CASE REPORT: NORMAL
LABORATORY COMMENT REPORT: ABNORMAL
LAMBDA LC FREE SERPL-MCNC: 20.4 MG/L (ref 5.7–26.3)
M PROTEIN SERPL ELPH-MCNC: 0.7 G/DL
PATH REPORT.FINAL DX SPEC: NORMAL
PATH REPORT.GROSS SPEC: NORMAL
PROT SERPL-MCNC: 7 G/DL (ref 6–8.5)

## 2023-05-09 ENCOUNTER — HOSPITAL ENCOUNTER (OUTPATIENT)
Dept: CT IMAGING | Facility: HOSPITAL | Age: 73
Discharge: HOME | End: 2023-05-09
Admitting: NURSE PRACTITIONER
Payer: MEDICARE

## 2023-05-09 DIAGNOSIS — D64.9 ANEMIA, UNSPECIFIED TYPE: Chronic | ICD-10-CM

## 2023-05-09 PROCEDURE — 25510000001 IOPAMIDOL 61 % SOLUTION: Performed by: NURSE PRACTITIONER

## 2023-05-09 PROCEDURE — 74177 CT ABD & PELVIS W/CONTRAST: CPT

## 2023-05-09 RX ADMIN — IOPAMIDOL 100 ML: 612 INJECTION, SOLUTION INTRAVENOUS at 14:58

## 2023-05-18 ENCOUNTER — OFFICE VISIT (OUTPATIENT)
Dept: ONCOLOGY | Facility: CLINIC | Age: 73
End: 2023-05-18
Payer: MEDICARE

## 2023-05-18 VITALS
SYSTOLIC BLOOD PRESSURE: 137 MMHG | BODY MASS INDEX: 31.1 KG/M2 | OXYGEN SATURATION: 98 % | HEART RATE: 83 BPM | TEMPERATURE: 97.8 F | WEIGHT: 181.2 LBS | DIASTOLIC BLOOD PRESSURE: 75 MMHG | RESPIRATION RATE: 16 BRPM

## 2023-05-18 DIAGNOSIS — D47.2 MGUS (MONOCLONAL GAMMOPATHY OF UNKNOWN SIGNIFICANCE): Primary | ICD-10-CM

## 2023-05-18 PROCEDURE — 99214 OFFICE O/P EST MOD 30 MIN: CPT | Performed by: INTERNAL MEDICINE

## 2023-05-18 PROCEDURE — 1125F AMNT PAIN NOTED PAIN PRSNT: CPT | Performed by: INTERNAL MEDICINE

## 2023-05-18 RX ORDER — PREDNISONE 10 MG/1
20 TABLET ORAL DAILY
COMMUNITY

## 2023-05-18 RX ORDER — DOXYCYCLINE HYCLATE 100 MG/1
100 CAPSULE ORAL 2 TIMES DAILY
COMMUNITY

## 2023-05-18 NOTE — PROGRESS NOTES
Follow Up Office Visit      Date: 2023     Patient Name: Harley Rodriges  MRN: [id number]  : 1950  Referring Physician: Bam German     Chief Complaint:  Follow-up for MGUS     History of Present Illness: Harley Rodriges is a pleasant 72 y.o. female with a past medical history of fibromyalgia, Lyme disease, iron deficiency, type 2 diabetes, hypertension, hypothyroidism who presents today for evaluation of anemia. The patient is accompanied by their friend who contributes to the history of their care.  Patient has been followed by the PCP as well as GI where she had labs checked last month which were notable for mild anemia with a hemoglobin of 11.3.  Iron studies were within normal limits and vitamin B12 was slightly elevated.  Per patient, she takes ferrous sulfate 3 times daily.  Denies any abdominal pain or discomfort.  Does note some dark stools related to the medication.  She has noted over the past 3 months that she is having significantly worsening fatigue, tiredness, and dizzy spells which appear worse when getting up to ambulate.  She has had to cut back on work and has had diminished quality of life as a result of this.    Interval History:  Presents clinic for follow-up.  Still having some slight weakness and fatigue as well as some mild dizziness.  Otherwise no other major complaints.  Remains compliant with her other medications    Oncology History:    Oncology/Hematology History    No history exists.       Subjective      Review of Systems:   Constitutional: Negative for fevers, chills, or weight loss  Eyes: Negative for blurred vision or discharge         Ear/Nose/Throat: Negative for difficulty swallowing, sore throat, LAD                                                       Respiratory: Negative for cough, SOA, wheezing                                                                                        Cardiovascular: Negative for chest pain or palpitations                                                                   Gastrointestinal: Negative for nausea, vomiting or diarrhea                                                                     Genitourinary: Negative for dysuria or hematuria                                                                                           Musculoskeletal: Negative for any joint pains or muscle aches                                                                        Neurologic: Negative for any weakness, headaches, dizziness                                                                         Hematologic: Negative for any easy bleeding or bruising                                                                                   Psychiatric: Negative for anxiety or depression                          Past Medical History/Past Surgical History/ Family History/ Social History: Reviewed by me and unchanged from my previous documentation done on April 2023.     Medications:     Current Outpatient Medications:   •  acetaminophen (TYLENOL) 500 MG tablet, Take 1 tablet by mouth Every 6 (Six) Hours As Needed for Mild Pain., Disp: , Rfl:   •  celecoxib (CeleBREX) 200 MG capsule, Take 1 capsule by mouth Daily., Disp: , Rfl:   •  dicyclomine (BENTYL) 20 MG tablet, Take 1 tablet by mouth 3 (Three) Times a Day As Needed (lower abdominal pain and diarrhea). (Patient taking differently: Take 1 tablet by mouth As Needed (lower abdominal pain and diarrhea).), Disp: 30 tablet, Rfl: 1  •  diphenhydrAMINE (BENADRYL) 25 mg capsule, Take 1 capsule by mouth Every 6 (Six) Hours As Needed for Itching., Disp: , Rfl:   •  doxycycline (VIBRAMYCIN) 100 MG capsule, Take 1 capsule by mouth 2 (Two) Times a Day., Disp: , Rfl:   •  DULoxetine (CYMBALTA) 30 MG capsule, Take 1 capsule by mouth Daily., Disp: , Rfl:   •  estradiol (ESTRACE) 0.5 MG tablet, Take 1 tablet by mouth Daily., Disp: , Rfl:   •  ferrous sulfate 324 (65 Fe) MG tablet delayed-release EC tablet, Take 1  tablet by mouth Daily With Breakfast., Disp: , Rfl:   •  hydroCHLOROthiazide (HYDRODIURIL) 25 MG tablet, Take 1 tablet by mouth Daily., Disp: , Rfl:   •  losartan (COZAAR) 50 MG tablet, Take 1 tablet by mouth Daily., Disp: , Rfl:   •  MAGNESIUM CITRATE PO, Take 250 mg by mouth., Disp: , Rfl:   •  metFORMIN (GLUCOPHAGE) 1000 MG tablet, Take 500 mg by mouth 2 (Two) Times a Day With Meals. 500 mg in am, 1000 mg at night, Disp: , Rfl:   •  Misc Natural Products (Adrenal) capsule, Take  by mouth Daily., Disp: , Rfl:   •  Multiple Vitamins-Minerals (HAIR SKIN NAILS PO), Take 3 tablets by mouth Daily., Disp: , Rfl:   •  Multiple Vitamins-Minerals (ONE-A-DAY WOMENS 50+ PO), Take  by mouth., Disp: , Rfl:   •  Niacin, Antihyperlipidemic, 500 MG tablet, Take 1 tablet by mouth Daily., Disp: , Rfl:   •  predniSONE (DELTASONE) 10 MG tablet, Take 2 tablets by mouth Daily. Dose pack, Disp: , Rfl:   •  Thyroid 60 MG PO tablet, Take 1 tablet by mouth Daily. Total 75 mg per day, Disp: , Rfl:   •  TRAZODONE HCL PO, Take  by mouth Every Night., Disp: , Rfl:   •  vitamin D3 125 MCG (5000 UT) capsule capsule, Take 1 capsule by mouth Daily., Disp: , Rfl:   •  Zinc 50 MG tablet, Take 1 tablet by mouth Daily., Disp: , Rfl:     Allergies:   Allergies   Allergen Reactions   • Prednisone Irritability     Can take, but makes me sleepy, irritable, foggy in my head       Objective     Physical Exam:  Vital Signs:   Vitals:    05/18/23 1128   BP: 137/75   Pulse: 83   Resp: 16   Temp: 97.8 °F (36.6 °C)   SpO2: 98%   Weight: 82.2 kg (181 lb 3.2 oz)   PainSc:   3   PainLoc: Generalized     Pain Score    05/18/23 1128   PainSc:   3   PainLoc: Generalized     ECOG Performance Status: 1 - Symptomatic but completely ambulatory    Constitutional: NAD, ECOG 1   Eyes: PERRLA, scleral anicteric  ENT: No LAD, no thyromegaly  Respiratory: CTAB, no wheezing, rales, rhonchi  Cardiovascular: RRR, no murmurs, pulses 2+ bilaterally  Abdomen: soft, NT/ND, no  HSM  Musculoskeletal: strength 5/5 bilaterally, no c/c/e  Neurologic: A&O x 3, CN II-XII intact grossly    Results Review:   No visits with results within 2 Week(s) from this visit.   Latest known visit with results is:   Lab on 04/27/2023   Component Date Value Ref Range Status   • Glucose 04/27/2023 133 (H)  65 - 99 mg/dL Final   • BUN 04/27/2023 20  8 - 23 mg/dL Final   • Creatinine 04/27/2023 1.08 (H)  0.57 - 1.00 mg/dL Final   • Sodium 04/27/2023 135 (L)  136 - 145 mmol/L Final   • Potassium 04/27/2023 4.2  3.5 - 5.2 mmol/L Final   • Chloride 04/27/2023 98  98 - 107 mmol/L Final   • CO2 04/27/2023 25.0  22.0 - 29.0 mmol/L Final   • Calcium 04/27/2023 10.1  8.6 - 10.5 mg/dL Final   • Total Protein 04/27/2023 7.2  6.0 - 8.5 g/dL Final   • Albumin 04/27/2023 4.6  3.5 - 5.2 g/dL Final   • ALT (SGPT) 04/27/2023 10  1 - 33 U/L Final   • AST (SGOT) 04/27/2023 17  1 - 32 U/L Final   • Alkaline Phosphatase 04/27/2023 43  39 - 117 U/L Final   • Total Bilirubin 04/27/2023 0.2  0.0 - 1.2 mg/dL Final   • Globulin 04/27/2023 2.6  gm/dL Final   • A/G Ratio 04/27/2023 1.8  g/dL Final   • BUN/Creatinine Ratio 04/27/2023 18.5  7.0 - 25.0 Final   • Anion Gap 04/27/2023 12.0  5.0 - 15.0 mmol/L Final   • eGFR 04/27/2023 54.0 (L)  >60.0 mL/min/1.73 Final   • LDH 04/27/2023 129 (L)  135 - 214 U/L Final   • Folate 04/27/2023 >20.00  4.78 - 24.20 ng/mL Final   • Vitamin B-12 04/27/2023 555  211 - 946 pg/mL Final   • Reticulocyte % 04/27/2023 1.30  0.70 - 1.90 % Final   • Reticulocyte Absolute 04/27/2023 0.0514  0.0200 - 0.1300 10*6/mm3 Final   • Pathology Review 04/27/2023 Yes   Final   • Beta-2 Microglobulin 04/27/2023 3.4 (H)  0.8 - 2.2 mg/L Final   • IgG 04/27/2023 1206  586 - 1602 mg/dL Final   • IgA 04/27/2023 45 (L)  64 - 422 mg/dL Final    Result confirmed on concentration.   • IgM 04/27/2023 70  26 - 217 mg/dL Final   • Total Protein 04/27/2023 7.0  6.0 - 8.5 g/dL Final   • Albumin 04/27/2023 4.0  2.9 - 4.4 g/dL Final   •  "Alpha-1-Globulin 04/27/2023 0.2  0.0 - 0.4 g/dL Final   • Alpha-2-Globulin 04/27/2023 0.8  0.4 - 1.0 g/dL Final   • Beta Globulin 04/27/2023 0.9  0.7 - 1.3 g/dL Final   • Gamma Globulin 04/27/2023 1.1  0.4 - 1.8 g/dL Final   • M-Ronnie 04/27/2023 0.7 (H)  Not Observed g/dL Final   • Globulin 04/27/2023 3.0  2.2 - 3.9 g/dL Final   • A/G Ratio 04/27/2023 1.4  0.7 - 1.7 Final   • Immunofixation Reflex, Serum 04/27/2023 Comment (A)   Final    Immunofixation shows IgG monoclonal protein with kappa light chain  specificity.   PLEASE NOTE:   Samples from patients receiving DARZALEX(R) (daratumumab) or   SARCLISA(R)(isatuximab-irfc) treatment can appear as an   \"IgG kappa\" and mask a complete response (CR). If this patient   is receiving these therapies, this RODRICK assay interference   can be removed by ordering test number [id number]-\"Immunofixation,   Daratumumab-Specific, Serum\" or [id number]-\"Immunofixation,   Isatuximab-Specific, Serum\" and submitting a new sample for   testing or by calling the lab to add this test to the current   sample.   • Please note 04/27/2023 Comment   Final    Protein electrophoresis scan will follow via computer, mail, or   delivery.   • Free Light Chain, [place] 04/27/2023 42.7 (H)  3.3 - 19.4 mg/L Final   • Free Lambda Light Chains 04/27/2023 20.4  5.7 - 26.3 mg/L Final   • Kappa/Lambda Ratio 04/27/2023 2.09 (H)  0.26 - 1.65 Final   • WBC 04/27/2023 6.72  3.40 - 10.80 10*3/mm3 Final   • RBC 04/27/2023 3.95  3.77 - 5.28 10*6/mm3 Final   • Hemoglobin 04/27/2023 11.7 (L)  12.0 - 15.9 g/dL Final   • Hematocrit 04/27/2023 34.5  34.0 - 46.6 % Final   • MCV 04/27/2023 87.3  79.0 - 97.0 fL Final   • MCH 04/27/2023 29.6  26.6 - 33.0 pg Final   • MCHC 04/27/2023 33.9  31.5 - 35.7 g/dL Final   • RDW 04/27/2023 12.6  12.3 - 15.4 % Final   • RDW-SD 04/27/2023 39.6  37.0 - 54.0 fl Final   • MPV 04/27/2023 8.8  6.0 - 12.0 fL Final   • Platelets 04/27/2023 295  140 - 450 10*3/mm3 Final   • Neutrophil % " 04/27/2023 47.7  42.7 - 76.0 % Final   • Lymphocyte % 04/27/2023 41.4  19.6 - 45.3 % Final   • Monocyte % 04/27/2023 7.1  5.0 - 12.0 % Final   • Eosinophil % 04/27/2023 3.3  0.3 - 6.2 % Final   • Basophil % 04/27/2023 0.4  0.0 - 1.5 % Final   • Immature Grans % 04/27/2023 0.1  0.0 - 0.5 % Final   • Neutrophils, Absolute 04/27/2023 3.20  1.70 - 7.00 10*3/mm3 Final   • Lymphocytes, Absolute 04/27/2023 2.78  0.70 - 3.10 10*3/mm3 Final   • Monocytes, Absolute 04/27/2023 0.48  0.10 - 0.90 10*3/mm3 Final   • Eosinophils, Absolute 04/27/2023 0.22  0.00 - 0.40 10*3/mm3 Final   • Basophils, Absolute 04/27/2023 0.03  0.00 - 0.20 10*3/mm3 Final   • Immature Grans, Absolute 04/27/2023 0.01  0.00 - 0.05 10*3/mm3 Final   • nRBC 04/27/2023 0.0  0.0 - 0.2 /100 WBC Final   • Final Diagnosis 04/27/2023    Final                    Value:This result contains rich text formatting which cannot be displayed here.   • Gross Description 04/27/2023    Final                    Value:This result contains rich text formatting which cannot be displayed here.   • Case Report 04/27/2023    Final                    Value:Surgical Pathology Report                         Case: [id number]                                  Authorizing Provider:  Elmer Pacheco MD      Collected:           04/27/2023 12:15 PM          Ordering Location:     [facility]    Received:            04/27/2023 09:50 PM                                 OUTPAT LAB                                                                   Pathologist:           Issac Martinez MD                                                         Specimen:                                                                                                  CT Abdomen Pelvis With Contrast    Result Date: 5/10/2023  Narrative: PROCEDURE: CT ABDOMEN PELVIS W CONTRAST-  HISTORY: anemia; D64.9-Anemia, unspecified  COMPARISON: 04/13/2022.  PROCEDURE: The patient was injected with IV contrast.  Oral contrast was administered. Axial images were obtained from the lung bases to the pubic symphysis by computed tomography. This study was performed with techniques to keep radiation doses as low as reasonably achievable, (ALARA). Individualized dose reduction techniques using automated exposure control or adjustment of mA and/or kV according to the patient size were employed.  FINDINGS:  ABDOMEN: The lung bases are clear. The heart is proper size. The liver demonstrates diffuse fatty infiltration, similar to prior. Gallbladder present with no CT visible stones. The spleen is unremarkable. There is a stable, circumscribed, hypodense right adrenal mass; left adrenal gland is normal. Vascular calcifications noted. The pancreas is unremarkable. The kidneys demonstrate normal appearance of the right kidney. Posteriorly in the left kidney is a circumscribed, hypodense lesion likely cyst which is not significantly changed from the prior exam. The aorta is proper caliber. There is no free fluid or adenopathy.  PELVIS: The GI tract demonstrates no obstruction.  The appendix is is not identified but no secondary signs of appendicitis seen. Uterus is diminutive or absent. Moderate stool burden noted. No bony destructive lesion seen. The urinary bladder is almost completely collapsed. There is no free fluid, adenopathy, or inflammatory process.      Impression: 1. Fatty infiltration of the liver stable from prior. 2. Stable right adrenal mass, likely adenoma as well as small left simple appearing renal cyst. 3. Moderate stool burden suggesting constipation.  This report was signed and finalized on 5/10/2023 8:53 AM by Gloria Vines MD.      Assessment / Plan      Assessment/Plan:   1. MGUS (monoclonal gammopathy of unknown significance) (Primary)  -Work-up for anemia notable for an M spike of 0.7 with an IgG kappa light chain component  -Kappa/lambda light chain ratio within normal limits, beta-2 microglobulin mildly  elevated  -Creatinine overall stable and calcium levels within normal limits  -Discussed the diagnosis which represents a 1% chance additive per year increase of developing into multiple myeloma  -Offered bone marrow biopsy for complete work-up however patient would like to defer this at this time  -Plan to repeat myeloma studies in 3 months and if they remain stable we will transition to every 6-month surveillance       Follow Up:   Follow-up in 3 months     Elmer Pacheco MD  Hematology and Oncology     Please note that portions of this note may have been completed with a voice recognition program. Efforts were made to edit the dictations, but occasionally words are mistranscribed.

## 2023-08-31 ENCOUNTER — TRANSCRIBE ORDERS (OUTPATIENT)
Dept: ADMINISTRATIVE | Facility: HOSPITAL | Age: 73
End: 2023-08-31
Payer: MEDICARE

## 2023-08-31 DIAGNOSIS — Z12.31 ENCOUNTER FOR SCREENING MAMMOGRAM FOR MALIGNANT NEOPLASM OF BREAST: Primary | ICD-10-CM

## 2023-11-04 ENCOUNTER — APPOINTMENT (OUTPATIENT)
Dept: GENERAL RADIOLOGY | Facility: HOSPITAL | Age: 73
End: 2023-11-04
Payer: MEDICARE

## 2023-11-04 ENCOUNTER — APPOINTMENT (OUTPATIENT)
Dept: CT IMAGING | Facility: HOSPITAL | Age: 73
End: 2023-11-04
Payer: MEDICARE

## 2023-11-04 ENCOUNTER — HOSPITAL ENCOUNTER (OUTPATIENT)
Facility: HOSPITAL | Age: 73
Setting detail: OBSERVATION
Discharge: HOME | End: 2023-11-05
Attending: EMERGENCY MEDICINE | Admitting: NURSE PRACTITIONER
Payer: MEDICARE

## 2023-11-04 DIAGNOSIS — R29.818 FOCAL NEUROLOGICAL DEFICIT: Primary | ICD-10-CM

## 2023-11-04 DIAGNOSIS — R29.810 FACIAL DROOP: ICD-10-CM

## 2023-11-04 DIAGNOSIS — R53.81 DEBILITY: ICD-10-CM

## 2023-11-04 LAB
ALBUMIN SERPL-MCNC: 4.5 G/DL (ref 3.5–5.2)
ALBUMIN/GLOB SERPL: 1.6 G/DL
ALP SERPL-CCNC: 74 U/L (ref 39–117)
ALT SERPL W P-5'-P-CCNC: 18 U/L (ref 1–33)
ANION GAP SERPL CALCULATED.3IONS-SCNC: 8.9 MMOL/L (ref 5–15)
APTT PPP: 29.4 SECONDS (ref 23.5–35.5)
AST SERPL-CCNC: 21 U/L (ref 1–32)
BASOPHILS # BLD AUTO: 0.05 10*3/MM3 (ref 0–0.2)
BASOPHILS NFR BLD AUTO: 0.8 % (ref 0–1.5)
BILIRUB SERPL-MCNC: 0.2 MG/DL (ref 0–1.2)
BUN SERPL-MCNC: 15 MG/DL (ref 8–23)
BUN/CREAT SERPL: 16.9 (ref 7–25)
CALCIUM SPEC-SCNC: 10 MG/DL (ref 8.6–10.5)
CHLORIDE SERPL-SCNC: 100 MMOL/L (ref 98–107)
CO2 SERPL-SCNC: 27.1 MMOL/L (ref 22–29)
CREAT SERPL-MCNC: 0.89 MG/DL (ref 0.57–1)
DEPRECATED RDW RBC AUTO: 43.8 FL (ref 37–54)
EGFRCR SERPLBLD CKD-EPI 2021: 67.7 ML/MIN/1.73
EOSINOPHIL # BLD AUTO: 0.18 10*3/MM3 (ref 0–0.4)
EOSINOPHIL NFR BLD AUTO: 2.9 % (ref 0.3–6.2)
ERYTHROCYTE [DISTWIDTH] IN BLOOD BY AUTOMATED COUNT: 13.4 % (ref 12.3–15.4)
GLOBULIN UR ELPH-MCNC: 2.9 GM/DL
GLUCOSE BLDC GLUCOMTR-MCNC: 103 MG/DL (ref 70–130)
GLUCOSE BLDC GLUCOMTR-MCNC: 187 MG/DL (ref 70–130)
GLUCOSE SERPL-MCNC: 177 MG/DL (ref 65–99)
HCT VFR BLD AUTO: 34.7 % (ref 34–46.6)
HGB BLD-MCNC: 11.5 G/DL (ref 12–15.9)
HOLD SPECIMEN: NORMAL
HOLD SPECIMEN: NORMAL
IMM GRANULOCYTES # BLD AUTO: 0.06 10*3/MM3 (ref 0–0.05)
IMM GRANULOCYTES NFR BLD AUTO: 1 % (ref 0–0.5)
INR PPP: 0.93 (ref 0.9–1.1)
LYMPHOCYTES # BLD AUTO: 2.66 10*3/MM3 (ref 0.7–3.1)
LYMPHOCYTES NFR BLD AUTO: 43.4 % (ref 19.6–45.3)
MCH RBC QN AUTO: 29.4 PG (ref 26.6–33)
MCHC RBC AUTO-ENTMCNC: 33.1 G/DL (ref 31.5–35.7)
MCV RBC AUTO: 88.7 FL (ref 79–97)
MONOCYTES # BLD AUTO: 0.37 10*3/MM3 (ref 0.1–0.9)
MONOCYTES NFR BLD AUTO: 6 % (ref 5–12)
NEUTROPHILS NFR BLD AUTO: 2.81 10*3/MM3 (ref 1.7–7)
NEUTROPHILS NFR BLD AUTO: 45.9 % (ref 42.7–76)
NRBC BLD AUTO-RTO: 0 /100 WBC (ref 0–0.2)
PLATELET # BLD AUTO: 283 10*3/MM3 (ref 140–450)
PMV BLD AUTO: 8.3 FL (ref 6–12)
POTASSIUM SERPL-SCNC: 4 MMOL/L (ref 3.5–5.2)
PROT SERPL-MCNC: 7.4 G/DL (ref 6–8.5)
PROTHROMBIN TIME: 12.9 SECONDS (ref 12.3–15.1)
RBC # BLD AUTO: 3.91 10*6/MM3 (ref 3.77–5.28)
SODIUM SERPL-SCNC: 136 MMOL/L (ref 136–145)
TROPONIN T SERPL HS-MCNC: 9 NG/L
WBC NRBC COR # BLD: 6.13 10*3/MM3 (ref 3.4–10.8)
WHOLE BLOOD HOLD COAG: NORMAL
WHOLE BLOOD HOLD SPECIMEN: NORMAL

## 2023-11-04 PROCEDURE — 80053 COMPREHEN METABOLIC PANEL: CPT | Performed by: EMERGENCY MEDICINE

## 2023-11-04 PROCEDURE — 71045 X-RAY EXAM CHEST 1 VIEW: CPT

## 2023-11-04 PROCEDURE — 84484 ASSAY OF TROPONIN QUANT: CPT | Performed by: EMERGENCY MEDICINE

## 2023-11-04 PROCEDURE — 70496 CT ANGIOGRAPHY HEAD: CPT

## 2023-11-04 PROCEDURE — 85610 PROTHROMBIN TIME: CPT | Performed by: EMERGENCY MEDICINE

## 2023-11-04 PROCEDURE — G0378 HOSPITAL OBSERVATION PER HR: HCPCS

## 2023-11-04 PROCEDURE — 85730 THROMBOPLASTIN TIME PARTIAL: CPT | Performed by: EMERGENCY MEDICINE

## 2023-11-04 PROCEDURE — 85025 COMPLETE CBC W/AUTO DIFF WBC: CPT | Performed by: EMERGENCY MEDICINE

## 2023-11-04 PROCEDURE — 36415 COLL VENOUS BLD VENIPUNCTURE: CPT

## 2023-11-04 PROCEDURE — 25510000001 IOPAMIDOL 61 % SOLUTION: Performed by: EMERGENCY MEDICINE

## 2023-11-04 PROCEDURE — 99285 EMERGENCY DEPT VISIT HI MDM: CPT

## 2023-11-04 PROCEDURE — 93005 ELECTROCARDIOGRAM TRACING: CPT | Performed by: EMERGENCY MEDICINE

## 2023-11-04 PROCEDURE — 99204 OFFICE O/P NEW MOD 45 MIN: CPT | Performed by: PSYCHIATRY & NEUROLOGY

## 2023-11-04 PROCEDURE — 70498 CT ANGIOGRAPHY NECK: CPT

## 2023-11-04 PROCEDURE — 63710000001 INSULIN ASPART PER 5 UNITS: Performed by: NURSE PRACTITIONER

## 2023-11-04 PROCEDURE — 70450 CT HEAD/BRAIN W/O DYE: CPT

## 2023-11-04 PROCEDURE — 82948 REAGENT STRIP/BLOOD GLUCOSE: CPT

## 2023-11-04 RX ORDER — ONDANSETRON 2 MG/ML
4 INJECTION INTRAMUSCULAR; INTRAVENOUS EVERY 6 HOURS PRN
Status: DISCONTINUED | OUTPATIENT
Start: 2023-11-04 | End: 2023-11-05 | Stop reason: HOSPADM

## 2023-11-04 RX ORDER — THYROID 60 MG/1
60 TABLET ORAL DAILY
Status: DISCONTINUED | OUTPATIENT
Start: 2023-11-04 | End: 2023-11-04

## 2023-11-04 RX ORDER — INSULIN ASPART 100 [IU]/ML
2-7 INJECTION, SOLUTION INTRAVENOUS; SUBCUTANEOUS
Status: DISCONTINUED | OUTPATIENT
Start: 2023-11-04 | End: 2023-11-05 | Stop reason: HOSPADM

## 2023-11-04 RX ORDER — ATORVASTATIN CALCIUM 80 MG/1
80 TABLET, FILM COATED ORAL NIGHTLY
Status: DISCONTINUED | OUTPATIENT
Start: 2023-11-04 | End: 2023-11-05 | Stop reason: HOSPADM

## 2023-11-04 RX ORDER — SODIUM CHLORIDE 0.9 % (FLUSH) 0.9 %
10 SYRINGE (ML) INJECTION AS NEEDED
Status: DISCONTINUED | OUTPATIENT
Start: 2023-11-04 | End: 2023-11-05 | Stop reason: HOSPADM

## 2023-11-04 RX ORDER — TRAZODONE HYDROCHLORIDE 50 MG/1
50 TABLET ORAL NIGHTLY
Status: DISCONTINUED | OUTPATIENT
Start: 2023-11-05 | End: 2023-11-05 | Stop reason: HOSPADM

## 2023-11-04 RX ORDER — DEXTROSE MONOHYDRATE 25 G/50ML
25 INJECTION, SOLUTION INTRAVENOUS
Status: DISCONTINUED | OUTPATIENT
Start: 2023-11-04 | End: 2023-11-05 | Stop reason: HOSPADM

## 2023-11-04 RX ORDER — DULOXETIN HYDROCHLORIDE 30 MG/1
60 CAPSULE, DELAYED RELEASE ORAL DAILY
Status: DISCONTINUED | OUTPATIENT
Start: 2023-11-04 | End: 2023-11-05 | Stop reason: HOSPADM

## 2023-11-04 RX ORDER — ACETAMINOPHEN 650 MG/1
650 SUPPOSITORY RECTAL EVERY 4 HOURS PRN
Status: DISCONTINUED | OUTPATIENT
Start: 2023-11-04 | End: 2023-11-05 | Stop reason: HOSPADM

## 2023-11-04 RX ORDER — ASPIRIN 325 MG
325 TABLET ORAL ONCE
Status: COMPLETED | OUTPATIENT
Start: 2023-11-04 | End: 2023-11-04

## 2023-11-04 RX ORDER — TRAZODONE HYDROCHLORIDE 100 MG/1
50 TABLET ORAL
COMMUNITY

## 2023-11-04 RX ORDER — DULOXETIN HYDROCHLORIDE 60 MG/1
1 CAPSULE, DELAYED RELEASE ORAL DAILY
COMMUNITY
Start: 2023-10-27

## 2023-11-04 RX ORDER — NICOTINE POLACRILEX 4 MG
15 LOZENGE BUCCAL
Status: DISCONTINUED | OUTPATIENT
Start: 2023-11-04 | End: 2023-11-05 | Stop reason: HOSPADM

## 2023-11-04 RX ORDER — THYROID 60 MG/1
60 TABLET ORAL DAILY
Status: DISCONTINUED | OUTPATIENT
Start: 2023-11-05 | End: 2023-11-05 | Stop reason: HOSPADM

## 2023-11-04 RX ORDER — SODIUM CHLORIDE 9 MG/ML
40 INJECTION, SOLUTION INTRAVENOUS AS NEEDED
Status: DISCONTINUED | OUTPATIENT
Start: 2023-11-04 | End: 2023-11-05 | Stop reason: HOSPADM

## 2023-11-04 RX ORDER — LORATADINE 10 MG/1
10 TABLET ORAL DAILY
COMMUNITY

## 2023-11-04 RX ORDER — ACETAMINOPHEN 325 MG/1
650 TABLET ORAL EVERY 4 HOURS PRN
Status: DISCONTINUED | OUTPATIENT
Start: 2023-11-04 | End: 2023-11-05 | Stop reason: HOSPADM

## 2023-11-04 RX ORDER — SODIUM CHLORIDE 0.9 % (FLUSH) 0.9 %
10 SYRINGE (ML) INJECTION EVERY 12 HOURS SCHEDULED
Status: DISCONTINUED | OUTPATIENT
Start: 2023-11-04 | End: 2023-11-05 | Stop reason: HOSPADM

## 2023-11-04 RX ORDER — LOSARTAN POTASSIUM 50 MG/1
50 TABLET ORAL DAILY
Status: DISCONTINUED | OUTPATIENT
Start: 2023-11-05 | End: 2023-11-05 | Stop reason: HOSPADM

## 2023-11-04 RX ADMIN — Medication 10 ML: at 21:14

## 2023-11-04 RX ADMIN — INSULIN ASPART 2 UNITS: 100 INJECTION, SOLUTION INTRAVENOUS; SUBCUTANEOUS at 21:16

## 2023-11-04 RX ADMIN — TRAZODONE HYDROCHLORIDE 50 MG: 50 TABLET ORAL at 23:32

## 2023-11-04 RX ADMIN — Medication 10 ML: at 14:52

## 2023-11-04 RX ADMIN — ASPIRIN 325 MG: 325 TABLET ORAL at 12:57

## 2023-11-04 RX ADMIN — IOPAMIDOL 100 ML: 612 INJECTION, SOLUTION INTRAVENOUS at 12:21

## 2023-11-04 RX ADMIN — DULOXETINE HYDROCHLORIDE 60 MG: 30 CAPSULE, DELAYED RELEASE ORAL at 21:14

## 2023-11-04 RX ADMIN — ACETAMINOPHEN 650 MG: 325 TABLET, FILM COATED ORAL at 23:32

## 2023-11-04 RX ADMIN — ATORVASTATIN CALCIUM 80 MG: 80 TABLET, FILM COATED ORAL at 21:15

## 2023-11-04 NOTE — H&P
"  [facility]   HISTORY AND PHYSICAL      Name:  Harley Rodriges   Age:  73 y.o.  Sex:  female  :  1950  MRN:  [id number]   Visit Number:  [id number]  Admission Date:  2023  Date Of Service:  23  Primary Care Physician:  Bam German PA    Chief Complaint:     Facial droop    History Of Presenting Illness:      Ms. Rodriges is a pleasant 73-year-old female with pertinent past medical history of right-sided Bell's palsy, chronic fatigue syndrome, fibromyalgia, essential hypertension, with recent syncopal episodes while traveling with admission who presented to the emergency department due to left-sided facial droop.  Last known normal 10 PM last night.  When patient awakened and looked at her face she noted left-sided facial droop.  She also had associated slurred speech.  Denies unilateral weakness or confusion.  Of note, patient did recently follow with her cardiologist Dr. Waldrop who had noted new onset atrial fibrillation and suggested sleep study and possible Watchman device.  She denied history of GI bleed.  She denies any further syncopal episodes or presyncope.  However, she states that her gait has been\" off\" for quite some time and she has been ambulating with rollator walker.    Upon ED presentation patient blood pressure 172/112 nonhypoxic on room air.  Pertinent labs and imaging included negative CTA of head neck/CT of the head and otherwise unremarkable CMP/CBC/troponin.  Neurology consulted who suggested administration of aspirin/statin and MRI as available.  Hospitalist consulted for further medical management.    Review Of Systems:    All systems were reviewed and negative except as mentioned in history of presenting illness, assessment and plan.    Past Medical History: Patient  has a past medical history of Anemia (1966), Anxiety and depression, Bradycardia, Cancer (2019), Colon polyp (2016), Coronary artery disease (2016), COVID-19 vaccine series " completed, Diabetes mellitus, Diabetic polyneuropathy, Disease of thyroid gland, Duodenal ulcer (7/7/2022), Ear piercing, Elevated cholesterol, Erosive gastropathy (7/7/2022), Esophagitis, Fatty liver (2010), Fibromyalgia, Fibromyalgia, Gastritis, GERD (gastroesophageal reflux disease), History of colon polyps, History of nuclear stress test (2013), Hyperlipidemia, Hypertension, Iron deficiency, Irritable bowel syndrome (2015), [place] grade C esophagitis (7/7/2022), Lyme disease (2016), Problems with swallowing, Rheumatoid arthritis, Seasonal allergies, Sleep apnea, Ulcer (2022), Ulcer of esophagus without bleeding (7/7/2022), Urinary frequency, and Wears glasses.    Past Surgical History: Patient  has a past surgical history that includes Esophagogastroduodenoscopy (2016); Colonoscopy w/ biopsies (2016); Colonoscopy (2016); Upper gastrointestinal endoscopy (2016); Hysterectomy; Appendectomy; Tonsillectomy; Cardiac catheterization; breast lumpectomy with sentinel node biopsy and axillary node dissection; Colonoscopy (N/A, 01/15/2021); [place] tooth extraction; Esophagogastroduodenoscopy (N/A, 5/12/2022); and Esophagogastroduodenoscopy (N/A, 1/13/2023).    Social History: Patient  reports that she has never smoked. She has never used smokeless tobacco. She reports that she does not drink alcohol and does not use drugs.    Family History:  Patient's family history has been reviewed and found to be noncontributory.     Allergies:      Prednisone    Home Medications:    Prior to Admission Medications       Prescriptions Last Dose Informant Patient Reported? Taking?    acetaminophen (TYLENOL) 500 MG tablet  Self Yes No    Take 1 tablet by mouth Every 6 (Six) Hours As Needed for Mild Pain.    celecoxib (CeleBREX) 200 MG capsule   Yes No    Take 1 capsule by mouth Daily.    dicyclomine (BENTYL) 20 MG tablet  Self No No    Take 1 tablet by mouth 3 (Three) Times a Day As Needed (lower abdominal pain and diarrhea).     Patient taking differently:  Take 1 tablet by mouth As Needed (lower abdominal pain and diarrhea).    diphenhydrAMINE (BENADRYL) 25 mg capsule   Yes No    Take 1 capsule by mouth Every 6 (Six) Hours As Needed for Itching.    doxycycline (VIBRAMYCIN) 100 MG capsule   Yes No    Take 1 capsule by mouth 2 (Two) Times a Day.    DULoxetine (CYMBALTA) 30 MG capsule   Yes No    Take 1 capsule by mouth Daily.    estradiol (ESTRACE) 0.5 MG tablet  Self Yes No    Take 1 tablet by mouth Daily.    ferrous sulfate 324 (65 Fe) MG tablet delayed-release EC tablet   Yes No    Take 1 tablet by mouth Daily With Breakfast.    hydroCHLOROthiazide (HYDRODIURIL) 25 MG tablet  Self Yes No    Take 1 tablet by mouth Daily.    losartan (COZAAR) 50 MG tablet   Yes No    Take 1 tablet by mouth Daily.    MAGNESIUM CITRATE PO   Yes No    Take 250 mg by mouth.    metFORMIN (GLUCOPHAGE) 1000 MG tablet  Self Yes No    Take 500 mg by mouth 2 (Two) Times a Day With Meals. 500 mg in am, 1000 mg at night    Misc Natural Products (Adrenal) capsule  Self Yes No    Take  by mouth Daily.    Multiple Vitamins-Minerals (HAIR SKIN NAILS PO)  Self Yes No    Take 3 tablets by mouth Daily.    Multiple Vitamins-Minerals (ONE-A-DAY WOMENS 50+ PO)  Self Yes No    Take  by mouth.    Niacin, Antihyperlipidemic, 500 MG tablet  Self Yes No    Take 1 tablet by mouth Daily.    omeprazole (priLOSEC) 40 MG capsule   No No    TAKE 1 CAPSULE BY MOUTH DAILY IN THE MORNING- 30 MINUTES BEFORE BREAKFAST    predniSONE (DELTASONE) 10 MG tablet   Yes No    Take 2 tablets by mouth Daily. Dose pack    Thyroid 60 MG PO tablet  Self Yes No    Take 1 tablet by mouth Daily. Total 75 mg per day    TRAZODONE HCL PO   Yes No    Take  by mouth Every Night.    vitamin D3 125 MCG (5000 UT) capsule capsule   Yes No    Take 1 capsule by mouth Daily.    Zinc 50 MG tablet  Self Yes No    Take 1 tablet by mouth Daily.          ED Medications:    Medications   sodium chloride 0.9 % flush 10 mL (has  "no administration in time range)   atorvastatin (LIPITOR) tablet 80 mg (has no administration in time range)   aspirin tablet 325 mg (325 mg Oral Given 11/4/23 1257)   iopamidol (ISOVUE-300) 61 % injection 100 mL (100 mL Intravenous Given 11/4/23 1221)     Vital Signs:  Temp:  [98.6 °F (37 °C)] 98.6 °F (37 °C)  Heart Rate:  [73-81] 73  Resp:  [18] 18  BP: (152-172)/() 160/100        11/04/23  1109   Weight: 90.7 kg (200 lb)     Body mass index is 34.33 kg/m².    Physical Exam:     Most recent vital Signs: /100   Pulse 73   Temp 98.6 °F (37 °C) (Oral)   Resp 18   Ht 162.6 cm (64\")   Wt 90.7 kg (200 lb)   SpO2 97%   BMI 34.33 kg/m²     Physical Exam  Vitals and nursing note reviewed.   Constitutional:       Appearance: She is obese.   HENT:      Head: Normocephalic.      Nose: Nose normal.      Mouth/Throat:      Mouth: Mucous membranes are dry.   Eyes:      Pupils: Pupils are equal, round, and reactive to light.   Cardiovascular:      Rate and Rhythm: Normal rate and regular rhythm.      Pulses: Normal pulses.      Heart sounds: Normal heart sounds.   Pulmonary:      Effort: Pulmonary effort is normal.      Breath sounds: Normal breath sounds.   Abdominal:      General: Bowel sounds are normal.      Palpations: Abdomen is soft.   Musculoskeletal:         General: Normal range of motion.      Cervical back: Normal range of motion.   Skin:     General: Skin is warm.      Capillary Refill: Capillary refill takes less than 2 seconds.   Neurological:      General: No focal deficit present.      Mental Status: She is alert and oriented to person, place, and time.      Comments: Left sided facial droop   Psychiatric:         Mood and Affect: Mood normal.         Laboratory data:    I have reviewed the labs done in the emergency room.    Results from last 7 days   Lab Units 11/04/23  1125   SODIUM mmol/L 136   POTASSIUM mmol/L 4.0   CHLORIDE mmol/L 100   CO2 mmol/L 27.1   BUN mg/dL 15   CREATININE mg/dL " 0.89   CALCIUM mg/dL 10.0   BILIRUBIN mg/dL 0.2   ALK PHOS U/L 74   ALT (SGPT) U/L 18   AST (SGOT) U/L 21   GLUCOSE mg/dL 177*     Results from last 7 days   Lab Units 11/04/23  1125   WBC 10*3/mm3 6.13   HEMOGLOBIN g/dL 11.5*   HEMATOCRIT % 34.7   PLATELETS 10*3/mm3 283     Results from last 7 days   Lab Units 11/04/23  1125   INR  0.93     Results from last 7 days   Lab Units 11/04/23  1125   HSTROP T ng/L 9         EKG:      Sinus rhythm 73 BPM    Radiology:    CT Angiogram Neck    Result Date: 11/4/2023  PROCEDURE: CT ANGIOGRAM HEAD-  History: Neuro deficit  COMPARISON: None.  FINDINGS: Aortic arch is patent. Right and left common carotid are patent. Right and left internal carotid arteries are patent. Right and left vertebral arteries are patent. V4 segments of the vertebral arteries are patent. Basilar artery is patent. Left and right posterior cerebral arteries appear patent. Right and left middle cerebral arteries and proximal branches appear patent. The anterior cerebral arteries are patent      No definite major vessel occlusion in the brain or neck..     This report was signed and finalized on 11/4/2023 12:38 PM by aNe Croft MD.      CT Angiogram Head    Result Date: 11/4/2023  PROCEDURE: CT ANGIOGRAM HEAD-  History: Neuro deficit  COMPARISON: None.  FINDINGS: Aortic arch is patent. Right and left common carotid are patent. Right and left internal carotid arteries are patent. Right and left vertebral arteries are patent. V4 segments of the vertebral arteries are patent. Basilar artery is patent. Left and right posterior cerebral arteries appear patent. Right and left middle cerebral arteries and proximal branches appear patent. The anterior cerebral arteries are patent      No definite major vessel occlusion in the brain or neck..     This report was signed and finalized on 11/4/2023 12:37 PM by Nae Croft MD.      CT Head Without Contrast Stroke Protocol    Result Date: 11/4/2023  CT  HEAD WITHOUT CONTRAST  HISTORY: Neuro deficit  TECHNIQUE: Noncontrast exam  FINDINGS: Brain parenchyma is homogeneous without evidence of hemorrhage, mass effect or edema. No extra-axial abnormality is noted. Ventricles and cisterns appear normal.  The visualized sinuses, orbits and petrous temporal bones appear unremarkable.      No acute intracranial abnormality    CTDI: 31.38 mGy DLP:510.31 mGy.cm   This study was performed using dose reduction techniques to achieve radiation exposure as low as reasonably achievable (ALARA)  This report was signed and finalized on 11/4/2023 12:27 PM by Nae Croft MD.      XR Chest 1 View    Result Date: 11/4/2023  PORTABLE CHEST  11/4/2023 12:53 PM  HISTORY: Chest pain  COMPARISON:   None  FINDINGS: The cardiac silhouette is normal in size. The mediastinal and hilar contours are unremarkable.  The lungs are clear. There is no pneumothorax. The visualized osseous structures demonstrate no acute abnormalities.      No acute cardiopulmonary process.        This report was signed and finalized on 11/4/2023 11:58 AM by Nae Croft MD.       Assessment:    Left-sided facial droop, suspect underlying CVA, POA  Chronic right-sided Bell's palsy  Diabetes mellitus type 2  Essential hypertension  Chronic fatigue syndrome  Fibromyalgia  Hypothyroidism    Plan:    -Ischemic stroke pathway initiated.  -MRI ordered for a.m.  -Aspirin given.  Statin ordered.  -Sliding scale insulin with ACHS fingerstick blood sugars  -A1c and lipid pending  -Continue home medications as appropriate.    Risk Assessment: High  DVT Prophylaxis: SCDs  Code Status: Full code  Diet: Once passes bedside swallow can advance to cardiac/consistent carb    Advance Care Planning   ACP discussion was declined by the patient. Patient does not have an advance directive, declines further assistance.           Gloria Su, APRN  11/04/23  13:18 EDT    Dictated utilizing Dragon dictation.

## 2023-11-04 NOTE — Clinical Note
Level of Care: Telemetry [5]   Diagnosis: Focal neurological deficit [667030]   Admitting Physician: SEE EMERSON [[id number]]   Attending Physician: CHEYENNE LO [[id number]]

## 2023-11-04 NOTE — CONSULTS
TELE STROKE ALERT CONSULT  Originating site: Location of the patient: Hospital  Distant site: Location of the provider: [facility]  Patient consented to  perform the visit via video: Yes  All individuals on call allowed to hear: Yes  Any barriers to effective communication: No                                                                                                                                                                                      CC: AMS    HPI:  It is a great pleasure to see Harley Rodriges, a 73 y.o. female today in the hospital . Briefly these are the events happened as per the chart and taken from the chart. I responded immediately to stroke alert with in 2 minutes after getting notification.   The patient was doing fine and the symptoms started when she woke up with  left-sided facial droop .They are sudden in onset.  The symptoms  fluctuated in the beginning. There were no aggravating and relieving factors. She has Atrial fibrillation diagnosed by cardiologist but has not been started on anticoagulants.. They cancelled stroke alert    Last Known Normal: Last night ,   Thrombolytics administered: No-  As the symptoms got better. Patient and family does not want tpa after discussing and considering all  the risks and benefits       Past Medical History:  Past Medical History:   Diagnosis Date    Anemia 1966    Anxiety and depression     Bradycardia     Cancer 2019    Squamous cell nose    Colon polyp 2016    Coronary artery disease 2016    Bradycardia    COVID-19 vaccine series completed     Moderna x2, plus a booster    Diabetes mellitus     Diabetic polyneuropathy     Disease of thyroid gland     Duodenal ulcer 7/7/2022    Ear piercing     Elevated cholesterol     Erosive gastropathy 7/7/2022    Esophagitis     Fatty liver 2010    Fibromyalgia     Fibromyalgia     Gastritis     GERD (gastroesophageal reflux disease)     History of colon polyps     History of nuclear stress test  "2013    Hyperlipidemia     Hypertension     Iron deficiency     Irritable bowel syndrome 2015    [place] grade C esophagitis 7/7/2022    Lyme disease 2016    Problems with swallowing     food and liquids    Rheumatoid arthritis     Seasonal allergies     Sleep apnea     no CPAP    Ulcer 2022    Endoscopy recent    Ulcer of esophagus without bleeding 7/7/2022    Urinary frequency     Wears glasses     for reading       Past Surgical History:   has a past surgical history that includes Esophagogastroduodenoscopy (2016); Colonoscopy w/ biopsies (2016); Colonoscopy (2016); Upper gastrointestinal endoscopy (2016); Hysterectomy; Appendectomy; Tonsillectomy; Cardiac catheterization; breast lumpectomy with sentinel node biopsy and axillary node dissection; Colonoscopy (N/A, 01/15/2021); [place] tooth extraction; Esophagogastroduodenoscopy (N/A, 5/12/2022); and Esophagogastroduodenoscopy (N/A, 1/13/2023).    Family History:  Family History   Problem Relation Age of Onset    Heart disease Mother     Skin cancer Mother     Heart disease Father     Arthritis Father     Polymyalgia rheumatica Father     Skin cancer Father     Colon cancer Neg Hx     Inflammatory bowel disease Neg Hx        Social History:   reports that she has never smoked. She has never used smokeless tobacco. She reports that she does not drink alcohol and does not use drugs.    Allergies:  Prednisone    Medications:  (Not in a hospital admission)        Physical Examination:   Blood pressure 160/100, pulse 73, temperature 98.6 °F (37 °C), temperature source Oral, resp. rate 18, height 162.6 cm (64\"), weight 90.7 kg (200 lb), SpO2 97%.    [employer] [place] [employer] Stroke Scale:  1A. LOC: 0   1B. Question: 0   1C. Commands: 0   2. Gaze: 0   3. Visual Fields: 0   4. Facial Palsy: 1   5A. Arm Left: 0   5B. Arm Right: 0   6A. Leg Left: 0   6B. Leg Right: 0   7. Ataxia: 0   8. Sensory: 0   9. Aphasia: 0   10. Dysarthria: 0   11. Extinction: 0    Total: 1 "       Laboratory Studies:    Results from last 7 days   Lab Units 11/04/23  1125   WBC 10*3/mm3 6.13   HEMOGLOBIN g/dL 11.5*   HEMATOCRIT % 34.7   PLATELETS 10*3/mm3 283     Results from last 7 days   Lab Units 11/04/23  1125   SODIUM mmol/L 136   POTASSIUM mmol/L 4.0   CHLORIDE mmol/L 100   CO2 mmol/L 27.1   BUN mg/dL 15   CREATININE mg/dL 0.89   CALCIUM mg/dL 10.0   BILIRUBIN mg/dL 0.2   ALK PHOS U/L 74   ALT (SGPT) U/L 18   AST (SGOT) U/L 21   GLUCOSE mg/dL 177*     Lab Results   Component Value Date    INR 0.93 11/04/2023    INR 0.93 03/23/2023    PROTIME 12.9 11/04/2023    PROTIME 12.7 03/23/2023       Imaging:    CT Angiogram Neck    Result Date: 11/4/2023  Narrative: PROCEDURE: CT ANGIOGRAM HEAD-  History: Neuro deficit  COMPARISON: None.  FINDINGS: Aortic arch is patent. Right and left common carotid are patent. Right and left internal carotid arteries are patent. Right and left vertebral arteries are patent. V4 segments of the vertebral arteries are patent. Basilar artery is patent. Left and right posterior cerebral arteries appear patent. Right and left middle cerebral arteries and proximal branches appear patent. The anterior cerebral arteries are patent      Impression: No definite major vessel occlusion in the brain or neck..     This report was signed and finalized on 11/4/2023 12:38 PM by Nae Croft MD.      CT Angiogram Head    Result Date: 11/4/2023  Narrative: PROCEDURE: CT ANGIOGRAM HEAD-  History: Neuro deficit  COMPARISON: None.  FINDINGS: Aortic arch is patent. Right and left common carotid are patent. Right and left internal carotid arteries are patent. Right and left vertebral arteries are patent. V4 segments of the vertebral arteries are patent. Basilar artery is patent. Left and right posterior cerebral arteries appear patent. Right and left middle cerebral arteries and proximal branches appear patent. The anterior cerebral arteries are patent      Impression: No definite major  vessel occlusion in the brain or neck..     This report was signed and finalized on 11/4/2023 12:37 PM by Nae Croft MD.      CT Head Without Contrast Stroke Protocol    Result Date: 11/4/2023  Narrative: CT HEAD WITHOUT CONTRAST  HISTORY: Neuro deficit  TECHNIQUE: Noncontrast exam  FINDINGS: Brain parenchyma is homogeneous without evidence of hemorrhage, mass effect or edema. No extra-axial abnormality is noted. Ventricles and cisterns appear normal.  The visualized sinuses, orbits and petrous temporal bones appear unremarkable.      Impression: No acute intracranial abnormality    CTDI: 31.38 mGy DLP:510.31 mGy.cm   This study was performed using dose reduction techniques to achieve radiation exposure as low as reasonably achievable (ALARA)  This report was signed and finalized on 11/4/2023 12:27 PM by Nae Croft MD.      XR Chest 1 View    Result Date: 11/4/2023  Narrative: PORTABLE CHEST  11/4/2023 12:53 PM  HISTORY: Chest pain  COMPARISON:   None  FINDINGS: The cardiac silhouette is normal in size. The mediastinal and hilar contours are unremarkable.  The lungs are clear. There is no pneumothorax. The visualized osseous structures demonstrate no acute abnormalities.      Impression: No acute cardiopulmonary process.        This report was signed and finalized on 11/4/2023 11:58 AM by Nae Croft MD.       DIAGNOSTICS:  All neuro Images were reviewed in detail     ASSESSMENT/PLAN:  It is a great pleasure to see Harley Rodriges, a 73 y.o. female today in the hospital.  Based on history, physical, lab and imaging data, the most likely suspected mechanism is stroke  CT head negative for any acute intracranial abnormality.  CTA head and neck negative for Large vessel occlusion  If MRI negative will start eliquis tomorrow      Recommendations:  Frequent neurochecks, vital sign parameters  MRI Brain without contrast, MRA/CTA head and neck,  Aspirin 81 mg daily.  Atorvastatin 80mg daily  Fasting  lipid panel, HbA1c, TTE ECHO,  We will continue permissive hypertension for typically 48 hrs then goal systolic 120     Additional Stroke Care (or Document Contraindication) as Follows:  Bedside dysphagia screen; if patient does not pass, maintain NPO and order formal speech evaluation   Lipid panel and initiate high intensity statin therapy for LDL goal less than 70  Control blood sugar for goal less than 180, and preferably less than 140, and check HbA1c  DVT prophylaxis  P.T./O.T./Speech/Rehabilitation consultations  Stroke education (contact stroke nurse if necessary)    This consult was performed using a telemedicine platform.  Verbal consent was obtained from patient/family prior to performance of evaluation.  Nurse was present during evaluation with camera time lasting 15 minutes.  Case discussed with provider after performance of consultation.  [place], [place].     Steve Chavez MD  [facility]  11/4/2023,  14:21 EDT

## 2023-11-04 NOTE — ED PROVIDER NOTES
Subjective:  Chief Complaint:  Facial droop    History of Present Illness:  Patient is a 73-year-old female with history of anemia, anxiety, fatty liver, fibromyalgia, HTN, among others presenting to the ER with complaints of facial droop that started around 845 this morning that came on suddenly.  Patient states she does have history of Bell's palsy.  Family ember at bedside reports that she has had some slurred speech this morning as well.  Patient is alert and oriented x3.  Denies any other deficits.  She does note that she was admitted in [facility] a couple months ago for syncopal episodes.  She states that she has had dizziness since then.  She denies any change in her balance since then.  She does note that she has been seeing Dr. Waldrop, cardiologist, and was advised that she is going into A-fib.  She is not on any anticoagulants.  She denies any beta-blocker use as well.  Denies additional symptoms or complaints at this time.      Nurses Notes reviewed and agree, including vitals, allergies, social history and prior medical history.     REVIEW OF SYSTEMS: All systems reviewed and not pertinent unless noted.  Review of Systems   Neurological:  Positive for dizziness (for months) and facial asymmetry.        Slurred speech today per family       Past Medical History:   Diagnosis Date    Anemia 1966    Anxiety and depression     Bradycardia     Cancer 2019    Squamous cell nose    Colon polyp 2016    Coronary artery disease 2016    Bradycardia    COVID-19 vaccine series completed     Moderna x2, plus a booster    Diabetes mellitus     Diabetic polyneuropathy     Disease of thyroid gland     Duodenal ulcer 7/7/2022    Ear piercing     Elevated cholesterol     Erosive gastropathy 7/7/2022    Esophagitis     Fatty liver 2010    Fibromyalgia     Fibromyalgia     Gastritis     GERD (gastroesophageal reflux disease)     History of colon polyps     History of nuclear stress test 2013    Hyperlipidemia      Hypertension     Iron deficiency     Irritable bowel syndrome 2015    [place] grade C esophagitis 7/7/2022    Lyme disease 2016    Problems with swallowing     food and liquids    Rheumatoid arthritis     Seasonal allergies     Sleep apnea     no CPAP    Ulcer 2022    Endoscopy recent    Ulcer of esophagus without bleeding 7/7/2022    Urinary frequency     Wears glasses     for reading       Allergies:    Prednisone      Past Surgical History:   Procedure Laterality Date    APPENDECTOMY      1980    BREAST LUMPECTOMY WITH SENTINEL NODE BIOPSY AND AXILLARY NODE DISSECTION      1981-benign    CARDIAC CATHETERIZATION      2015    COLONOSCOPY  2016    COLONOSCOPY N/A 01/15/2021    Procedure: COLONOSCOPY WITH BIOSPIES AND POLYPECTOMY;  Surgeon: Yuridia Vinson MD;  Location: [facility] ENDOSCOPY;  Service: Gastroenterology;  Laterality: N/A;    COLONOSCOPY W/ BIOPSIES  2016    Dr. Cason    ENDOSCOPY  2016    Dr. Cason    ENDOSCOPY N/A 5/12/2022    Procedure: ESOPHAGOGASTRODUODENOSCOPY WITH BIOPSY;  Surgeon: Yuridia Vinson MD;  Location: [facility] ENDOSCOPY;  Service: Gastroenterology;  Laterality: N/A;    ENDOSCOPY N/A 1/13/2023    Procedure: ESOPHAGOGASTRODUODENOSCOPY, biopsy;  Surgeon: Yuridia Vinson MD;  Location: [facility] ENDOSCOPY;  Service: Gastroenterology;  Laterality: N/A;    HYSTERECTOMY      1980    TONSILLECTOMY      1956 at age 10    UPPER GASTROINTESTINAL ENDOSCOPY  2016    WISDOM TOOTH EXTRACTION           Social History     Socioeconomic History    Marital status:    Tobacco Use    Smoking status: Never    Smokeless tobacco: Never   Vaping Use    Vaping Use: Never used   Substance and Sexual Activity    Alcohol use: Never    Drug use: Never    Sexual activity: Defer         Family History   Problem Relation Age of Onset    Heart disease Mother     Skin cancer Mother     Heart disease Father     Arthritis Father     Polymyalgia rheumatica Father     Skin cancer Father     Colon  "cancer Neg Hx     Inflammatory bowel disease Neg Hx        Objective  Physical Exam:  /100   Pulse 73   Temp 98.6 °F (37 °C) (Oral)   Resp 18   Ht 162.6 cm (64\")   Wt 90.7 kg (200 lb)   SpO2 97%   BMI 34.33 kg/m²      Physical Exam  Vitals and nursing note reviewed.   Constitutional:       General: She is not in acute distress.     Appearance: She is not toxic-appearing.   HENT:      Head: Normocephalic and atraumatic.      Right Ear: External ear normal.      Left Ear: External ear normal.      Nose: Nose normal.   Eyes:      Extraocular Movements: Extraocular movements intact.      Conjunctiva/sclera: Conjunctivae normal.   Cardiovascular:      Rate and Rhythm: Normal rate.   Pulmonary:      Effort: Pulmonary effort is normal. No respiratory distress.   Abdominal:      General: There is no distension.   Musculoskeletal:         General: Normal range of motion.      Cervical back: Normal range of motion and neck supple.   Skin:     General: Skin is warm and dry.   Neurological:      General: No focal deficit present.      Mental Status: She is alert and oriented to person, place, and time.   Psychiatric:         Mood and Affect: Mood normal.         Behavior: Behavior normal.         Procedures    ED Course:    ED Course as of 11/04/23 1404   Sat Nov 04, 2023   1130 Spoke with Neurologist who gave me his cell phone number. Recommended getting all the scans and wants to evaluate the patient via telehealth. Advised nursing staff to take telehealth machine in the room. [AP]   1245 EKG interpreted by me reveals sinus rhythm rate 73 no ectopy no ischemic changes [PF]      ED Course User Index  [AP] Kimberlyn Corrigan, SIERRA  [PF] Jordi Samano, DO       Lab Results (last 24 hours)       Procedure Component Value Units Date/Time    CBC & Differential [[id number]]  (Abnormal) Collected: 11/04/23 1125    Specimen: Blood Updated: 11/04/23 1131    Narrative:      The following orders were created for panel order CBC " & Differential.  Procedure                               Abnormality         Status                     ---------                               -----------         ------                     CBC Auto Differential[[id number]]        Abnormal            Final result                 Please view results for these tests on the individual orders.    Comprehensive Metabolic Panel [[id number]]  (Abnormal) Collected: 11/04/23 1125    Specimen: Blood Updated: 11/04/23 1154     Glucose 177 mg/dL      BUN 15 mg/dL      Creatinine 0.89 mg/dL      Sodium 136 mmol/L      Potassium 4.0 mmol/L      Chloride 100 mmol/L      CO2 27.1 mmol/L      Calcium 10.0 mg/dL      Total Protein 7.4 g/dL      Albumin 4.5 g/dL      ALT (SGPT) 18 U/L      AST (SGOT) 21 U/L      Alkaline Phosphatase 74 U/L      Total Bilirubin 0.2 mg/dL      Globulin 2.9 gm/dL      A/G Ratio 1.6 g/dL      BUN/Creatinine Ratio 16.9     Anion Gap 8.9 mmol/L      eGFR 67.7 mL/min/1.73     Narrative:      GFR Normal >60  Chronic Kidney Disease <60  Kidney Failure <15    The GFR formula is only valid for adults with stable renal function between ages 18 and 70.    Protime-INR [[id number]]  (Normal) Collected: 11/04/23 1125    Specimen: Blood Updated: 11/04/23 1149     Protime 12.9 Seconds      INR 0.93    Narrative:      Suggested INR therapeutic range for stable oral anticoagulant therapy:    Low Intensity therapy:   1.5-2.0  Moderate Intensity therapy:   2.0-3.0  High Intensity therapy:   2.5-4.0    aPTT [[id number]]  (Normal) Collected: 11/04/23 1125    Specimen: Blood Updated: 11/04/23 1149     PTT 29.4 seconds     Single High Sensitivity Troponin T [[id number]]  (Normal) Collected: 11/04/23 1125    Specimen: Blood Updated: 11/04/23 1156     HS Troponin T 9 ng/L     Narrative:      High Sensitive Troponin T Reference Range:  <10.0 ng/L- Negative Female for AMI  <15.0 ng/L- Negative Male for AMI  >=10 - Abnormal Female indicating possible myocardial injury.  >=15 - Abnormal  Male indicating possible myocardial injury.   Clinicians would have to utilize clinical acumen, EKG, Troponin, and serial changes to determine if it is an Acute Myocardial Infarction or myocardial injury due to an underlying chronic condition.         CBC Auto Differential [[id number]]  (Abnormal) Collected: 11/04/23 1125    Specimen: Blood Updated: 11/04/23 1131     WBC 6.13 10*3/mm3      RBC 3.91 10*6/mm3      Hemoglobin 11.5 g/dL      Hematocrit 34.7 %      MCV 88.7 fL      MCH 29.4 pg      MCHC 33.1 g/dL      RDW 13.4 %      RDW-SD 43.8 fl      MPV 8.3 fL      Platelets 283 10*3/mm3      Neutrophil % 45.9 %      Lymphocyte % 43.4 %      Monocyte % 6.0 %      Eosinophil % 2.9 %      Basophil % 0.8 %      Immature Grans % 1.0 %      Neutrophils, Absolute 2.81 10*3/mm3      Lymphocytes, Absolute 2.66 10*3/mm3      Monocytes, Absolute 0.37 10*3/mm3      Eosinophils, Absolute 0.18 10*3/mm3      Basophils, Absolute 0.05 10*3/mm3      Immature Grans, Absolute 0.06 10*3/mm3      nRBC 0.0 /100 WBC              CT Angiogram Neck    Result Date: 11/4/2023  PROCEDURE: CT ANGIOGRAM HEAD-  History: Neuro deficit  COMPARISON: None.  FINDINGS: Aortic arch is patent. Right and left common carotid are patent. Right and left internal carotid arteries are patent. Right and left vertebral arteries are patent. V4 segments of the vertebral arteries are patent. Basilar artery is patent. Left and right posterior cerebral arteries appear patent. Right and left middle cerebral arteries and proximal branches appear patent. The anterior cerebral arteries are patent      Impression: No definite major vessel occlusion in the brain or neck..     This report was signed and finalized on 11/4/2023 12:38 PM by Nae Croft MD.      CT Angiogram Head    Result Date: 11/4/2023  PROCEDURE: CT ANGIOGRAM HEAD-  History: Neuro deficit  COMPARISON: None.  FINDINGS: Aortic arch is patent. Right and left common carotid are patent. Right and left  internal carotid arteries are patent. Right and left vertebral arteries are patent. V4 segments of the vertebral arteries are patent. Basilar artery is patent. Left and right posterior cerebral arteries appear patent. Right and left middle cerebral arteries and proximal branches appear patent. The anterior cerebral arteries are patent      Impression: No definite major vessel occlusion in the brain or neck..     This report was signed and finalized on 11/4/2023 12:37 PM by Nae Croft MD.      CT Head Without Contrast Stroke Protocol    Result Date: 11/4/2023  CT HEAD WITHOUT CONTRAST  HISTORY: Neuro deficit  TECHNIQUE: Noncontrast exam  FINDINGS: Brain parenchyma is homogeneous without evidence of hemorrhage, mass effect or edema. No extra-axial abnormality is noted. Ventricles and cisterns appear normal.  The visualized sinuses, orbits and petrous temporal bones appear unremarkable.      Impression: No acute intracranial abnormality    CTDI: 31.38 mGy DLP:510.31 mGy.cm   This study was performed using dose reduction techniques to achieve radiation exposure as low as reasonably achievable (ALARA)  This report was signed and finalized on 11/4/2023 12:27 PM by Nae Croft MD.      XR Chest 1 View    Result Date: 11/4/2023  PORTABLE CHEST  11/4/2023 12:53 PM  HISTORY: Chest pain  COMPARISON:   None  FINDINGS: The cardiac silhouette is normal in size. The mediastinal and hilar contours are unremarkable.  The lungs are clear. There is no pneumothorax. The visualized osseous structures demonstrate no acute abnormalities.      Impression: No acute cardiopulmonary process.        This report was signed and finalized on 11/4/2023 11:58 AM by Nae Croft MD.          MDM  Patient was evaluated in the ER for left-sided facial droop and reported slurred speech this morning by family member.  She is hypertensive but otherwise hemodynamically stable, afebrile, nontoxic-appearing on exam.  Differential  diagnosis includes but is not limited to facial palsy, CVA, among others.  Patient states symptoms started 845.  Discussed with neurologist given that patient has also had recent issues with atrial fibrillation diagnosed by cardiologist but has not been started on anticoagulants increasing her risk for stroke.  Neurologist saw the patient via telehealth and they reported to him that symptoms started upon wakening which I was not aware of previously.  Evidently last known normal was 10:00 PM last night.  He advised getting CT and CTA of head and neck and not doing stroke protocol.    Spoke with Dr. Chavez, Neurologist, who states he will review images.  Recommended an MRI with and without contrast.  He says if all imaging was normal, we could consider discharging the patient.  However, we are unable to get an MRI today.  Therefore, he recommended admission as well as aspirin and statin and states that we will get MRI tomorrow.  Spoke with Dr. Soler, hospitalist, who has graciously accepted the patient for admission for further evaluation and management.    Final diagnoses:   Focal neurological deficit          Kimberlyn Corrigan PA-C  11/04/23 1240       Kimberlyn Corrigan PA-C  11/04/23 1405

## 2023-11-05 ENCOUNTER — APPOINTMENT (OUTPATIENT)
Dept: CARDIOLOGY | Facility: HOSPITAL | Age: 73
End: 2023-11-05
Payer: MEDICARE

## 2023-11-05 ENCOUNTER — APPOINTMENT (OUTPATIENT)
Dept: MRI IMAGING | Facility: HOSPITAL | Age: 73
End: 2023-11-05
Payer: MEDICARE

## 2023-11-05 VITALS
BODY MASS INDEX: 34.25 KG/M2 | SYSTOLIC BLOOD PRESSURE: 178 MMHG | DIASTOLIC BLOOD PRESSURE: 90 MMHG | WEIGHT: 200.62 LBS | RESPIRATION RATE: 16 BRPM | TEMPERATURE: 97.9 F | HEIGHT: 64 IN | OXYGEN SATURATION: 94 % | HEART RATE: 70 BPM

## 2023-11-05 LAB
ANION GAP SERPL CALCULATED.3IONS-SCNC: 7.9 MMOL/L (ref 5–15)
BASOPHILS # BLD AUTO: 0.04 10*3/MM3 (ref 0–0.2)
BASOPHILS NFR BLD AUTO: 0.6 % (ref 0–1.5)
BH CV ECHO MEAS - AO MAX PG: 7.8 MMHG
BH CV ECHO MEAS - AO MEAN PG: 4 MMHG
BH CV ECHO MEAS - AO ROOT DIAM: 2.8 CM
BH CV ECHO MEAS - AO V2 MAX: 140 CM/SEC
BH CV ECHO MEAS - AO V2 VTI: 28.7 CM
BH CV ECHO MEAS - AVA(I,D): 2.24 CM2
BH CV ECHO MEAS - EDV(CUBED): 73.6 ML
BH CV ECHO MEAS - EDV(MOD-SP2): 67.1 ML
BH CV ECHO MEAS - EDV(MOD-SP4): 86.3 ML
BH CV ECHO MEAS - EF(MOD-BP): 61.2 %
BH CV ECHO MEAS - EF(MOD-SP2): 64.1 %
BH CV ECHO MEAS - EF(MOD-SP4): 58.2 %
BH CV ECHO MEAS - ESV(CUBED): 27.8 ML
BH CV ECHO MEAS - ESV(MOD-SP2): 24.1 ML
BH CV ECHO MEAS - ESV(MOD-SP4): 36.1 ML
BH CV ECHO MEAS - FS: 27.7 %
BH CV ECHO MEAS - IVS/LVPW: 1.12 CM
BH CV ECHO MEAS - IVSD: 1.23 CM
BH CV ECHO MEAS - LA DIMENSION: 4 CM
BH CV ECHO MEAS - LAT PEAK E' VEL: 7.3 CM/SEC
BH CV ECHO MEAS - LV DIASTOLIC VOL/BSA (35-75): 44.1 CM2
BH CV ECHO MEAS - LV MASS(C)D: 170 GRAMS
BH CV ECHO MEAS - LV MAX PG: 5.3 MMHG
BH CV ECHO MEAS - LV MEAN PG: 3 MMHG
BH CV ECHO MEAS - LV SYSTOLIC VOL/BSA (12-30): 18.5 CM2
BH CV ECHO MEAS - LV V1 MAX: 115 CM/SEC
BH CV ECHO MEAS - LV V1 VTI: 24.4 CM
BH CV ECHO MEAS - LVIDD: 4.2 CM
BH CV ECHO MEAS - LVIDS: 3 CM
BH CV ECHO MEAS - LVOT AREA: 2.6 CM2
BH CV ECHO MEAS - LVOT DIAM: 1.83 CM
BH CV ECHO MEAS - LVPWD: 1.1 CM
BH CV ECHO MEAS - MED PEAK E' VEL: 5.3 CM/SEC
BH CV ECHO MEAS - MV A MAX VEL: 86.1 CM/SEC
BH CV ECHO MEAS - MV DEC SLOPE: 284 CM/SEC2
BH CV ECHO MEAS - MV DEC TIME: 0.19 SEC
BH CV ECHO MEAS - MV E MAX VEL: 53.1 CM/SEC
BH CV ECHO MEAS - MV E/A: 0.62
BH CV ECHO MEAS - MV MAX PG: 2.34 MMHG
BH CV ECHO MEAS - MV MEAN PG: 1 MMHG
BH CV ECHO MEAS - MV V2 VTI: 16.2 CM
BH CV ECHO MEAS - MVA(VTI): 4 CM2
BH CV ECHO MEAS - PA ACC TIME: 0.14 SEC
BH CV ECHO MEAS - PA V2 MAX: 102 CM/SEC
BH CV ECHO MEAS - RAP SYSTOLE: 3 MMHG
BH CV ECHO MEAS - RV MAX PG: 2.5 MMHG
BH CV ECHO MEAS - RV V1 MAX: 79.6 CM/SEC
BH CV ECHO MEAS - RV V1 VTI: 14.8 CM
BH CV ECHO MEAS - SI(MOD-SP2): 22 ML/M2
BH CV ECHO MEAS - SI(MOD-SP4): 25.7 ML/M2
BH CV ECHO MEAS - SV(LVOT): 64.2 ML
BH CV ECHO MEAS - SV(MOD-SP2): 43 ML
BH CV ECHO MEAS - SV(MOD-SP4): 50.2 ML
BH CV ECHO MEAS - TAPSE (>1.6): 2.5 CM
BH CV ECHO MEASUREMENTS AVERAGE E/E' RATIO: 8.43
BH CV ECHO SHUNT ASSESSMENT PERFORMED (HIDDEN SCRIPTING): 1
BH CV XLRA - RV BASE: 3.2 CM
BH CV XLRA - RV LENGTH: 7.8 CM
BH CV XLRA - RV MID: 2.7 CM
BH CV XLRA - TDI S': 9.5 CM/SEC
BUN SERPL-MCNC: 14 MG/DL (ref 8–23)
BUN/CREAT SERPL: 17.3 (ref 7–25)
CALCIUM SPEC-SCNC: 9.7 MG/DL (ref 8.6–10.5)
CHLORIDE SERPL-SCNC: 102 MMOL/L (ref 98–107)
CHOLEST SERPL-MCNC: 162 MG/DL (ref 0–200)
CO2 SERPL-SCNC: 27.1 MMOL/L (ref 22–29)
CREAT SERPL-MCNC: 0.81 MG/DL (ref 0.57–1)
DEPRECATED RDW RBC AUTO: 44.9 FL (ref 37–54)
EGFRCR SERPLBLD CKD-EPI 2021: 75.8 ML/MIN/1.73
EOSINOPHIL # BLD AUTO: 0.26 10*3/MM3 (ref 0–0.4)
EOSINOPHIL NFR BLD AUTO: 4.2 % (ref 0.3–6.2)
ERYTHROCYTE [DISTWIDTH] IN BLOOD BY AUTOMATED COUNT: 13.6 % (ref 12.3–15.4)
GLUCOSE BLDC GLUCOMTR-MCNC: 139 MG/DL (ref 70–130)
GLUCOSE BLDC GLUCOMTR-MCNC: 140 MG/DL (ref 70–130)
GLUCOSE SERPL-MCNC: 149 MG/DL (ref 65–99)
HBA1C MFR BLD: 6.7 % (ref 4.8–5.6)
HCT VFR BLD AUTO: 32.9 % (ref 34–46.6)
HDLC SERPL-MCNC: 58 MG/DL (ref 40–60)
HGB BLD-MCNC: 10.7 G/DL (ref 12–15.9)
IMM GRANULOCYTES # BLD AUTO: 0.03 10*3/MM3 (ref 0–0.05)
IMM GRANULOCYTES NFR BLD AUTO: 0.5 % (ref 0–0.5)
LDLC SERPL CALC-MCNC: 81 MG/DL (ref 0–100)
LDLC/HDLC SERPL: 1.34 {RATIO}
LEFT ATRIUM VOLUME INDEX: 22.6 ML/M2
LYMPHOCYTES # BLD AUTO: 3.4 10*3/MM3 (ref 0.7–3.1)
LYMPHOCYTES NFR BLD AUTO: 55 % (ref 19.6–45.3)
MCH RBC QN AUTO: 29 PG (ref 26.6–33)
MCHC RBC AUTO-ENTMCNC: 32.5 G/DL (ref 31.5–35.7)
MCV RBC AUTO: 89.2 FL (ref 79–97)
MONOCYTES # BLD AUTO: 0.48 10*3/MM3 (ref 0.1–0.9)
MONOCYTES NFR BLD AUTO: 7.8 % (ref 5–12)
NEUTROPHILS NFR BLD AUTO: 1.97 10*3/MM3 (ref 1.7–7)
NEUTROPHILS NFR BLD AUTO: 31.9 % (ref 42.7–76)
NRBC BLD AUTO-RTO: 0 /100 WBC (ref 0–0.2)
PLATELET # BLD AUTO: 251 10*3/MM3 (ref 140–450)
PMV BLD AUTO: 8.3 FL (ref 6–12)
POTASSIUM SERPL-SCNC: 4.4 MMOL/L (ref 3.5–5.2)
RBC # BLD AUTO: 3.69 10*6/MM3 (ref 3.77–5.28)
SODIUM SERPL-SCNC: 137 MMOL/L (ref 136–145)
TRIGL SERPL-MCNC: 131 MG/DL (ref 0–150)
VLDLC SERPL-MCNC: 23 MG/DL (ref 5–40)
WBC NRBC COR # BLD: 6.18 10*3/MM3 (ref 3.4–10.8)

## 2023-11-05 PROCEDURE — G0378 HOSPITAL OBSERVATION PER HR: HCPCS

## 2023-11-05 PROCEDURE — 70553 MRI BRAIN STEM W/O & W/DYE: CPT

## 2023-11-05 PROCEDURE — 93306 TTE W/DOPPLER COMPLETE: CPT

## 2023-11-05 PROCEDURE — 83036 HEMOGLOBIN GLYCOSYLATED A1C: CPT | Performed by: NURSE PRACTITIONER

## 2023-11-05 PROCEDURE — 80048 BASIC METABOLIC PNL TOTAL CA: CPT | Performed by: NURSE PRACTITIONER

## 2023-11-05 PROCEDURE — 85025 COMPLETE CBC W/AUTO DIFF WBC: CPT | Performed by: NURSE PRACTITIONER

## 2023-11-05 PROCEDURE — 93306 TTE W/DOPPLER COMPLETE: CPT | Performed by: INTERNAL MEDICINE

## 2023-11-05 PROCEDURE — 0 GADOBENATE DIMEGLUMINE 529 MG/ML SOLUTION: Performed by: INTERNAL MEDICINE

## 2023-11-05 PROCEDURE — 80061 LIPID PANEL: CPT | Performed by: NURSE PRACTITIONER

## 2023-11-05 PROCEDURE — A9577 INJ MULTIHANCE: HCPCS | Performed by: INTERNAL MEDICINE

## 2023-11-05 PROCEDURE — 82948 REAGENT STRIP/BLOOD GLUCOSE: CPT

## 2023-11-05 PROCEDURE — 99213 OFFICE O/P EST LOW 20 MIN: CPT | Performed by: PSYCHIATRY & NEUROLOGY

## 2023-11-05 RX ORDER — ASPIRIN 81 MG/1
81 TABLET ORAL DAILY
Status: DISCONTINUED | OUTPATIENT
Start: 2023-11-05 | End: 2023-11-05

## 2023-11-05 RX ORDER — ATORVASTATIN CALCIUM 80 MG/1
80 TABLET, FILM COATED ORAL NIGHTLY
Qty: 90 TABLET | Refills: 0 | Status: SHIPPED | OUTPATIENT
Start: 2023-11-05

## 2023-11-05 RX ADMIN — ACETAMINOPHEN 650 MG: 325 TABLET, FILM COATED ORAL at 08:37

## 2023-11-05 RX ADMIN — Medication 10 ML: at 09:32

## 2023-11-05 RX ADMIN — GADOBENATE DIMEGLUMINE 15 ML: 529 INJECTION, SOLUTION INTRAVENOUS at 09:08

## 2023-11-05 RX ADMIN — ASPIRIN 81 MG: 81 TABLET, COATED ORAL at 09:32

## 2023-11-05 RX ADMIN — APIXABAN 5 MG: 5 TABLET, FILM COATED ORAL at 14:35

## 2023-11-05 RX ADMIN — THYROID, PORCINE 60 MG: 60 TABLET ORAL at 09:32

## 2023-11-05 NOTE — PLAN OF CARE
Goal Outcome Evaluation:  Plan of Care Reviewed With: patient, daughter        Progress: no change  Outcome Evaluation: VSS; continues to have left facial droop-no other symptoms noted

## 2023-11-05 NOTE — DISCHARGE SUMMARY
[facility]   DISCHARGE SUMMARY      Name:  Harley Rodriges   Age:  73 y.o.  Sex:  female  :  1950  MRN:  [id number]   Visit Number:  [id number]    Admission Date:  2023  Date of Discharge:  2023  Primary Care Physician:  Bam German PA    Important issues to note:    -Patient admitted secondary to left-sided facial droop with known right-sided Bell's palsy.  -MRI/CT/other imaging negative for acute findings.  -Per patient noted to have atrial fibrillation with recent cardiology visit and was being set up for sleep study.  -Given this patient initiated on Eliquis per neurology recommendations.  -Echo noted grade 1 diastolic dysfunction with preserved EF.  -We will order PT/OT/SLP home health upon discharge.  -Return precautions given.  -To follow with neurology/cardiology/PCP as scheduled.    Discharge Diagnoses:     Left-sided facial droop, suspect underlying CVA, POA  New onset atrial fibrillation, POA  Chronic right-sided Bell's palsy  Diabetes mellitus type 2  Essential hypertension  Chronic fatigue syndrome  Fibromyalgia  Hypothyroidism    Problem List:     Active Hospital Problems    Diagnosis  POA    **Facial droop [R29.810]  Yes      Resolved Hospital Problems   No resolved problems to display.     Presenting Problem:    Chief Complaint   Patient presents with    Facial Droop    Hypertension      Consults:     Consulting Physician(s)                     None              Procedures Performed:        History of presenting illness/Hospital Course:    Ms. Rodriges is a pleasant 73-year-old female with pertinent past medical history of right-sided Bell's palsy, chronic fatigue syndrome, fibromyalgia, essential hypertension, with recent syncopal episodes while traveling with admission who presented to the emergency department due to left-sided facial droop.  Last known normal 10 PM last night.  When patient awakened and looked at her face she noted left-sided  "facial droop.  She also had associated slurred speech.  Denies unilateral weakness or confusion.  Of note, patient did recently follow with her cardiologist Dr. Waldrop who had noted new onset atrial fibrillation and suggested sleep study and possible Watchman device.  She denied history of GI bleed.  She denies any further syncopal episodes or presyncope.  However, she states that her gait has been\" off\" for quite some time and she has been ambulating with rollator walker.     Upon ED presentation patient blood pressure 172/112 nonhypoxic on room air.  Pertinent labs and imaging included negative CTA of head neck/CT of the head and otherwise unremarkable CMP/CBC/troponin.  Neurology consulted who suggested administration of aspirin/statin and MRI as available.  Hospitalist consulted for further medical management.  MRI completed with neurology following.  MRI noted no acute infarct or intracranial hemorrhage with filling defects in the right transverse sinus which may reflect nonocclusive small thrombi without any suspicious enhancing brain mass.  Given likely patient symptoms related to possible underlying atrial fibrillation noted by cardiology, neurology recommended initiating Eliquis.  Patient does have history of esophageal ulcers without prior GI bleed.  Risk and benefits discussed with patient.  Echo noted grade 1 diastolic dysfunction with preserved EF with negative saline test.  Patient will follow-up with PCP/Dr. Waldrop/neurology as scheduled.  Strict return precautions given.  Patient without any further focal deficits.  Patient able to ambulate per baseline.  However, given impaired mobility and ADLs for quite some time will order home health with PT/OT/SLP.  Strict return precautions given.    Vital Signs:    Temp:  [97.3 °F (36.3 °C)-98.8 °F (37.1 °C)] 97.9 °F (36.6 °C)  Heart Rate:  [70] 70  Resp:  [16-18] 16  BP: (105-178)/(49-95) 178/90    Physical Exam:    General Appearance:  Alert and " cooperative.  Middle-aged female.  Pleasant.  No acute distress.   Head:  Atraumatic and normocephalic.   Eyes: Conjunctivae and sclerae normal, no icterus. No pallor.   Ears:  Ears with no abnormalities noted.   Throat: No oral lesions, no thrush, oral mucosa moist.   Neck: Supple, trachea midline, no thyromegaly.   Back:   No kyphoscoliosis present. No tenderness to palpation.   Lungs:   Breath sounds heard bilaterally equally.  No crackles or wheezing. No Pleural rub or bronchial breathing.  On room air unlabored.   Heart:  Normal S1 and S2, no murmur, no gallop, no rub. No JVD.   Abdomen:   Normal bowel sounds, no masses, no organomegaly. Soft, nontender, nondistended, no rebound tenderness.   Extremities: Supple, no edema, no cyanosis, no clubbing.   Pulses: Pulses palpable bilaterally.   Skin: No bleeding or rash.   Neurologic: Alert and oriented x 3. No facial asymmetry. Moves all four limbs. No tremors.  Left-sided facial droop noted.  No other acute focal deficits noted.     Pertinent Lab Results:     Results from last 7 days   Lab Units 11/05/23  0615 11/04/23  1125   SODIUM mmol/L 137 136   POTASSIUM mmol/L 4.4 4.0   CHLORIDE mmol/L 102 100   CO2 mmol/L 27.1 27.1   BUN mg/dL 14 15   CREATININE mg/dL 0.81 0.89   CALCIUM mg/dL 9.7 10.0   BILIRUBIN mg/dL  --  0.2   ALK PHOS U/L  --  74   ALT (SGPT) U/L  --  18   AST (SGOT) U/L  --  21   GLUCOSE mg/dL 149* 177*     Results from last 7 days   Lab Units 11/05/23  0615 11/04/23  1125   WBC 10*3/mm3 6.18 6.13   HEMOGLOBIN g/dL 10.7* 11.5*   HEMATOCRIT % 32.9* 34.7   PLATELETS 10*3/mm3 251 283     Results from last 7 days   Lab Units 11/04/23  1125   INR  0.93     Results from last 7 days   Lab Units 11/04/23  1125   HSTROP T ng/L 9                           Pertinent Radiology Results:    Imaging Results (All)       Procedure Component Value Units Date/Time    MRI Brain With & Without Contrast [[id number]] Collected: 11/05/23 1000     Updated: 11/05/23 1007     Narrative:      MRI BRAINWITHOUT AND WITHCONTRAST     HISTORY: Left facial droop     TECHNIQUE: Multiple phasic and multi planar MRI imaging of the brain  performed without and with IV contrast.     FINDINGS: No evidence of diffusion restriction to suggest acute infarct  or intracranial hemorrhage. Small focus of susceptibility artifact in  the right occipital lobe subdural space is compatible with punctate  focus of calcification on CT. No evidence mass effect or edema. No  extra-axial abnormality is noted. Ventricles and cisterns appear normal.     Small filling defects are visualized in the right transverse sinus,  small blood clots not excluded.     The visualized sinuses, orbits and petrous temporal bones appear  unremarkable.     No suspicious enhancement in the brain.       Impression:         1. No acute infarct or intracranial hemorrhage.     2. Filling defects in the right transverse sinus may reflect  nonocclusive small thrombi.     3. No suspicious enhancing brain mass.                    This report was signed and finalized on 11/5/2023 10:05 AM by Nae Croft MD.       CT Angiogram Neck [[id number]] Collected: 11/04/23 1238     Updated: 11/04/23 1241    Narrative:      PROCEDURE: CT ANGIOGRAM HEAD-     History: Neuro deficit     COMPARISON: None.     FINDINGS: Aortic arch is patent. Right and left common carotid are  patent. Right and left internal carotid arteries are patent. Right and  left vertebral arteries are patent. V4 segments of the vertebral  arteries are patent. Basilar artery is patent. Left and right posterior  cerebral arteries appear patent. Right and left middle cerebral arteries  and proximal branches appear patent. The anterior cerebral arteries are  patent       Impression:      No definite major vessel occlusion in the brain or neck..               This report was signed and finalized on 11/4/2023 12:38 PM by Nae Croft MD.       CT Angiogram Head [[id number]]  Collected: 11/04/23 1235     Updated: 11/04/23 1239    Narrative:      PROCEDURE: CT ANGIOGRAM HEAD-     History: Neuro deficit     COMPARISON: None.     FINDINGS: Aortic arch is patent. Right and left common carotid are  patent. Right and left internal carotid arteries are patent. Right and  left vertebral arteries are patent. V4 segments of the vertebral  arteries are patent. Basilar artery is patent. Left and right posterior  cerebral arteries appear patent. Right and left middle cerebral arteries  and proximal branches appear patent. The anterior cerebral arteries are  patent       Impression:      No definite major vessel occlusion in the brain or neck..               This report was signed and finalized on 11/4/2023 12:37 PM by Nae Croft MD.       CT Head Without Contrast Stroke Protocol [[id number]] Collected: 11/04/23 1227     Updated: 11/04/23 1229    Narrative:      CT HEAD WITHOUT CONTRAST     HISTORY: Neuro deficit     TECHNIQUE: Noncontrast exam     FINDINGS: Brain parenchyma is homogeneous without evidence of  hemorrhage, mass effect or edema. No extra-axial abnormality is noted.  Ventricles and cisterns appear normal.     The visualized sinuses, orbits and petrous temporal bones appear  unremarkable.       Impression:      No acute intracranial abnormality           CTDI: 31.38 mGy  DLP:510.31 mGy.cm        This study was performed using dose reduction techniques to achieve  radiation exposure as low as reasonably achievable (ALARA)     This report was signed and finalized on 11/4/2023 12:27 PM by Nae Croft MD.       XR Chest 1 View [[id number]] Collected: 11/04/23 1158     Updated: 11/04/23 1200    Narrative:      PORTABLE CHEST  11/4/2023 12:53 PM     HISTORY: Chest pain     COMPARISON:   None     FINDINGS: The cardiac silhouette is normal in size. The mediastinal and  hilar contours are unremarkable.  The lungs are clear. There is no  pneumothorax. The visualized osseous  structures demonstrate no acute  abnormalities.       Impression:      No acute cardiopulmonary process.                       This report was signed and finalized on 11/4/2023 11:58 AM by Nae Croft MD.               Echo:    Results for orders placed during the hospital encounter of 11/04/23    Adult Transthoracic Echo Complete W/ Cont if Necessary Per Protocol (With Agitated Saline)    Interpretation Summary    Left ventricular ejection fraction appears to be 56 - 60%.    Left ventricular diastolic function is consistent with (grade I) impaired relaxation.    The left atrial cavity is dilated.    Saline test results are negative for right to left atrial level shunt.    There is calcification of the aortic valve.  No stenosis.    Estimated right ventricular systolic pressure from tricuspid regurgitation is normal (<35 mmHg).    Condition on Discharge:      Stable.    Code status during the hospital stay:    Code Status and Medical Interventions:   Ordered at: 11/04/23 1413     Level Of Support Discussed With:    Patient     Code Status (Patient has no pulse and is not breathing):    CPR (Attempt to Resuscitate)     Medical Interventions (Patient has pulse or is breathing):    Full Support     Discharge Disposition:    Home or Self Care    Discharge Medications:       Discharge Medications        New Medications        Instructions Start Date   apixaban 5 MG tablet tablet  Commonly known as: ELIQUIS   5 mg, Oral, Every 12 Hours Scheduled      atorvastatin 80 MG tablet  Commonly known as: LIPITOR   80 mg, Oral, Nightly             Changes to Medications        Instructions Start Date   dicyclomine 20 MG tablet  Commonly known as: BENTYL  What changed: when to take this   20 mg, Oral, 3 Times Daily PRN             Continue These Medications        Instructions Start Date   acetaminophen 500 MG tablet  Commonly known as: TYLENOL   500 mg, Oral, Every 6 Hours PRN      Adrenal capsule   Oral, Daily       diphenhydrAMINE 25 mg capsule  Commonly known as: BENADRYL   25 mg, Oral, Every 6 Hours PRN      doxycycline 100 MG capsule  Commonly known as: VIBRAMYCIN   100 mg, Oral, 2 Times Daily      DULoxetine 60 MG capsule  Commonly known as: CYMBALTA   1 capsule, Oral, Daily      estradiol 0.5 MG tablet  Commonly known as: ESTRACE   0.5 mg, Oral, Daily      ferrous sulfate 324 (65 Fe) MG tablet delayed-release EC tablet   324 mg, Oral, Daily With Breakfast      loratadine 10 MG tablet  Commonly known as: CLARITIN   10 mg, Oral, Daily      losartan 50 MG tablet  Commonly known as: COZAAR   Take 1 tablet by mouth Daily.      MAGNESIUM CITRATE PO   250 mg, Oral      metFORMIN 1000 MG tablet  Commonly known as: GLUCOPHAGE   500 mg, Oral, Daily With Breakfast, 500 mg in am      Niacin (Antihyperlipidemic) 500 MG tablet   500 mg, Oral, Daily      ONE-A-DAY WOMENS 50+ PO   Oral      HAIR SKIN NAILS PO   3 tablets, Oral, Daily      Thyroid 60 MG tablet  Commonly known as: ARMOUR   60 mg, Oral, Daily, Total 75 mg per day       traZODone 100 MG tablet  Commonly known as: DESYREL   50 mg, Oral, Every Night at Bedtime      vitamin D3 125 MCG (5000 UT) capsule capsule   5,000 Units, Oral, Daily      Zinc 50 MG tablet   1 tablet, Oral, Daily             Discharge Diet:     Diet Instructions       Diet: Diabetic Diets, Cardiac Diets; Healthy Heart (2-3 Na+); Thin (IDDSI 0); Consistent Carbohydrate      Discharge Diet:  Diabetic Diets  Cardiac Diets       Cardiac Diet: Healthy Heart (2-3 Na+)    Fluid Consistency: Thin (IDDSI 0)    Diabetic Diet: Consistent Carbohydrate          Activity at Discharge:     Activity Instructions       Activity as Tolerated            Follow-up Appointments:     Follow-up Information       Bam German PA Follow up in 1 week(s).    Specialty: Family Medicine  Contact information:  [address]  [facility] [address] [phone]               [facility] NEUROLOGY Follow up in 1  month(s).    Specialty: Neurology  Contact information:  [address]  [place] [address] [phone]  Additional information:  Phone Number: [phone]      Back Entrance of Main Hospital- Take elevators to the right located upon entrance to the second floor.             Manoj Waldrop MD Follow up in 1 week(s).    Specialties: Cardiology, Interventional Radiology  Contact information:  [address]   [place] [address] [phone]                           No future appointments.  Test Results Pending at Discharge:           Gloria Su, APRN  11/05/23  13:18 EST    Time: I spent 45 minutes on this discharge activity which included: face-to-face encounter with the patient, reviewing the data in the system, coordination of the care with the nursing staff as well as consultants, documentation, and entering orders.     Dictated utilizing Dragon dictation.

## 2023-11-05 NOTE — PROGRESS NOTES
"  NEUROLOGY TELE PROGRESS NOTE   Originating site: Location of the patient: Hospital  Distant site: Location of the provider: [facility]  Patient consented to  perform the visit via video: Yes  All individuals on call allowed to hear: Yes  Any barriers to effective communication: No  Patient Name: Harley Rodriges Medical Record Number:  [id number]   YOB: 1950 Age: 73 y.o.   Attending Physician: Abdirahman Soler DO Date of Service: 11/05/23   24 Hr Events:   No new neurological symptoms.      ____________________________________________________________  Physical exam:   /95 (BP Location: Left arm, Patient Position: Lying)   Pulse 70   Temp 97.3 °F (36.3 °C) (Oral)   Resp 16   Ht 162.6 cm (64\")   Wt 91 kg (200 lb 9.9 oz)   SpO2 94%   BMI 34.44 kg/m²   GEN: Awake. NAD.   Mental status: Alert, attentive, and oriented x3. Speech fluent with intact naming, repetition and comprehension. Normal fund of knowledge present. Normal recent and remote memory. CN:  EOMI. No nystagmus.    VF full to confrontation. Left facial droop Hearing intact to finger rub bilaterally. Tongue & uvula midline. Palate elevates symmetrically. Symmetric shoulder shrug.   Motor: No drift. Normal tone, bulk and strength 5/5 in bilateral upper & lower extremities   Coord: Normal FTN testing   NIHSS-1  LABS:      Lab Results   Component Value Date    WBC 6.18 11/05/2023    RBC 3.69 (L) 11/05/2023    HGB 10.7 (L) 11/05/2023    HCT 32.9 (L) 11/05/2023    MCV 89.2 11/05/2023    MCH 29.0 11/05/2023    MCHC 32.5 11/05/2023    RDW 13.6 11/05/2023     11/05/2023    MPV 8.3 11/05/2023        Lab Results   Component Value Date    CREATININE 0.81 11/05/2023    BUN 14 11/05/2023     11/05/2023    K 4.4 11/05/2023     11/05/2023    CO2 27.1 11/05/2023           Imaging:  All Images were  reviewed and reports are as mentioned above. "   ____________________________________________________________    ASSESSMENT/PLAN:  It is a great pleasure to see Harley Rodriges, a 73 y.o. female today in the hospital.  Based on history, physical, lab and imaging data, the most likely suspected mechanism is stroke  CT head negative for any acute intracranial abnormality.  CTA head and neck negative for Large vessel occlusion  I reviewed MRI brain on my read did not see big stroke- okay to start eliquis and stop aspirin     Recommendations:  Frequent neurochecks, vital sign parameters  Atorvastatin 80mg daily, ECHO  We will continue permissive hypertension for typically 48 hrs then goal systolic 120   Additional Stroke Care (or Document Contraindication) as Follows:  Bedside dysphagia screen; if patient does not pass, maintain NPO and order formal speech evaluation   Lipid panel and initiate high intensity statin therapy for LDL goal less than 70  Control blood sugar for goal less than 180, and preferably less than 140, and check HbA1c  DVT prophylaxis  P.T./O.T./Speech/Rehabilitation consultations  Stroke education (contact stroke nurse if necessary)      -Please do not hesitate to call with questions or concerns.  -Thank you for the opportunity to participate in the care of your patient.Please let us know if we can provide any additional information.      This consult was performed using a telemedicine platform.  Verbal consent was obtained from patient/family prior to performance of evaluation.  Nurse was present during evaluation with camera time lasting 15 minutes.  Case discussed with provider after performance of consultation.  [facility]INC[facility].     Steve Chavez MD  [facility]  11/5/2023,  09:04 EST

## 2023-11-05 NOTE — DISCHARGE INSTRUCTIONS
Patient to be discharged home.  Continue Eliquis.  Continue Lipitor.  Will initiate PT/OT/SLP  Follow with neurology in 1 month.  Follow with PCP in 1 week.  Return to ED for worsening symptoms.  Recommend outpatient sleep study.

## 2023-11-05 NOTE — CASE MANAGEMENT/SOCIAL WORK
Discharge Planning Assessment   [place]     Patient Name: Harley Rodriges  MRN: [id number]  Today's Date: 11/5/2023    Admit Date: 11/4/2023    Plan: DCP   Discharge Needs Assessment       Row Name 11/05/23 1324       Living Environment    People in Home alone    Current Living Arrangements home    Primary Care Provided by self    Provides Primary Care For no one    Family Caregiver if Needed other relative(s);friend(s)    Quality of Family Relationships unable to assess    Able to Return to Prior Arrangements yes       Resource/Environmental Concerns    Resource/Environmental Concerns none    Transportation Concerns none       Transition Planning    Patient/Family Anticipates Transition to home;home with help/services    Patient/Family Anticipated Services at Transition home health care    Transportation Anticipated family or friend will provide       Discharge Needs Assessment    Readmission Within the Last 30 Days no previous admission in last 30 days    Equipment Currently Used at Home walker, rolling    Concerns to be Addressed discharge planning    Anticipated Changes Related to Illness inability to care for self    Outpatient/Agency/Support Group Needs homecare agency    Discharge Facility/Level of Care Needs home with home health                   Discharge Plan       Row Name 11/05/23 1324       Plan    Plan DCP    Patient/Family in Agreement with Plan yes    Plan Comments Pt is being d/c'd before entire DCP can be completed. Pt resides in [place]. Pt uses [facility] for DME to help ambulate. Pt has PCP. Goal is home with family/friend to transport. Provider has ordered  services for pt. SW will work on arranging services for pt. Pt has [employer] Medicare replacement but does not have a [facility] provider, so should be able to arrange with no difficulty. SW will update when able.    Final Discharge Disposition Code 06 - home with home health care                  Continued Care and Services - Admitted Since 11/4/2023     Coordination has not been started for this encounter.       Expected Discharge Date and Time       Expected Discharge Date Expected Discharge Time    Nov 5, 2023            Demographic Summary       Row Name 11/05/23 1323       General Information    Admission Type observation    Arrived From home    Required Notices Provided Observation Status Notice    Referral Source admission list    Reason for Consult discharge planning    Preferred Language English                   Functional Status       Row Name 11/05/23 1323       Functional Status, IADL    Medications independent    Meal Preparation independent    Housekeeping independent    Laundry independent    Shopping independent       Mental Status Summary    Recent Changes in Mental Status/Cognitive Functioning unable to assess       Employment/    Employment Status self-employed                   Psychosocial       Row Name 11/05/23 1324       Developmental Stage (Eriksson's)    Developmental Stage Stage 8 (65 years-death/Late Adulthood) Integrity vs. Despair                   Abuse/Neglect    No documentation.                  Legal    No documentation.                  Substance Abuse    No documentation.                  Patient Forms    No documentation.                     HILDA Sampson

## 2023-11-06 ENCOUNTER — HOME HEALTH ADMISSION (OUTPATIENT)
Dept: HOME HEALTH SERVICES | Facility: HOME HEALTHCARE | Age: 73
End: 2023-11-06
Payer: MEDICARE

## 2023-11-06 ENCOUNTER — TELEPHONE (OUTPATIENT)
Dept: NEUROLOGY | Facility: CLINIC | Age: 73
End: 2023-11-06
Payer: MEDICARE

## 2023-11-06 NOTE — TELEPHONE ENCOUNTER
Caller: KEON     Relationship to patient: DISCHARGE     Best call back number: [phone]    New or established patient?  [x] New  [] Established    Date of discharge: 11.05.2023    Facility discharged from: [facility]     Diagnosis/Symptoms: FACIAL DROOP?     Length of stay (If applicable): 27 HOURS     Specialty Only: Did you see a Denominational health provider?    [x] Yes  [] No  If so, who?   TOMER LEONARDO     DISCHARGE SAYS  ONE MONTH       PLEASE CALL AND ADVISE

## 2023-11-07 NOTE — CASE MANAGEMENT/SOCIAL WORK
Case Management/Social Work    Patient Name:  Harley Rodriges  YOB: 1950  MRN: [id number]  Admit Date:  11/4/2023        [facility]  accepted pt for services.       Electronically signed by:  HILDA Sampson  11/07/23 09:09 EST

## 2023-11-08 ENCOUNTER — HOME CARE VISIT (OUTPATIENT)
Dept: HOME HEALTH SERVICES | Facility: HOME HEALTHCARE | Age: 73
End: 2023-11-08
Payer: MEDICARE

## 2023-11-08 VITALS
HEART RATE: 70 BPM | TEMPERATURE: 98.1 F | DIASTOLIC BLOOD PRESSURE: 73 MMHG | OXYGEN SATURATION: 99 % | SYSTOLIC BLOOD PRESSURE: 115 MMHG

## 2023-11-08 PROCEDURE — G0299 HHS/HOSPICE OF RN EA 15 MIN: HCPCS

## 2023-11-08 NOTE — HOME HEALTH
SOC Note:    Home Health ordered for: disciplines SN/PT/OT/ST    Reason for Hosp/Primary Dx/Co-morbidities: AFIB/[place] palsy/Lyme disease/on antibiotic     Focus of Care: AFIB/needs medication education/disease process education    Patient's goal(s):Get speech back to where it was, get stronger    Current Functional status/mobility/DME: independent/has walker she takes out with her if she will be walking real long distances    HB status/Living Arrangements: alone    Skin Integrity/wound status: no issues    Code Status: CPR    Fall Risk/Safety concerns: none    Medication issues/Concerns:none    Additional Problems/Concerns: none    SDOH Barriers (i.e. caregiver concerns, social isolation, transportation, food insecurity, environment, income etc.)/Need for MSW: no    Plan for next visit: medication education/disease process education

## 2023-11-08 NOTE — Clinical Note
SOC Note:    Home Health ordered for: disciplines SN/PT/OT/ST    Reason for Hosp/Primary Dx/Co-morbidities: AFIB/[place] palsy/Lyme disease/on antibiotic     Focus of Care: AFIB/needs medication education/disease process education    Patient's goal(s):Get speech back to where it was, get stronger    Current Functional status/mobility/DME: independent/has walker she takes out with her if she will be walking real long distances    HB status/Living Arrangements: alone    Skin Integrity/wound status: no issues    Code Status: CPR    Fall Risk/Safety concerns: none    Medication issues/Concerns:none    Additional Problems/Concerns: none    SDOH Barriers (i.e. caregiver concerns, social isolation, transportation, food insecurity, environment, income etc.)/Need for MSW: no    Plan for next visit: medication education/disease process education

## 2023-11-10 ENCOUNTER — HOME CARE VISIT (OUTPATIENT)
Dept: HOME HEALTH SERVICES | Facility: HOME HEALTHCARE | Age: 73
End: 2023-11-10
Payer: MEDICARE

## 2023-11-10 VITALS
RESPIRATION RATE: 16 BRPM | OXYGEN SATURATION: 96 % | TEMPERATURE: 98.6 F | DIASTOLIC BLOOD PRESSURE: 86 MMHG | HEART RATE: 73 BPM | SYSTOLIC BLOOD PRESSURE: 127 MMHG

## 2023-11-10 PROCEDURE — G0152 HHCP-SERV OF OT,EA 15 MIN: HCPCS

## 2023-11-10 NOTE — Clinical Note
OT eval only secondary to patient has no further skilled OT needs at this time. Please re-consult if pt. has a change/decline in status that would indicate a need for OT services. Thank you!

## 2023-11-10 NOTE — HOME HEALTH
74 y/o F  Patient admitted secondary to left-sided facial droop with known right-sided Bell's palsy.  Pt. seen today for OT evaluation and has no further skilled OT needs at this time secondary to pt. had returned to her baseline level of independence with all ADLs/IADLs at home. Pt. demonstrated understanding of basic home safety and has all necessary DME needs at this time.     pertinent past medical history of right-sided Bell's palsy, chronic fatigue syndrome, fibromyalgia, essential hypertension, with recent syncopal episodes

## 2023-11-13 ENCOUNTER — HOME CARE VISIT (OUTPATIENT)
Dept: HOME HEALTH SERVICES | Facility: HOME HEALTHCARE | Age: 73
End: 2023-11-13
Payer: MEDICARE

## 2023-11-13 VITALS
HEART RATE: 78 BPM | SYSTOLIC BLOOD PRESSURE: 121 MMHG | DIASTOLIC BLOOD PRESSURE: 54 MMHG | OXYGEN SATURATION: 95 % | RESPIRATION RATE: 16 BRPM

## 2023-11-13 VITALS
DIASTOLIC BLOOD PRESSURE: 82 MMHG | TEMPERATURE: 98.3 F | SYSTOLIC BLOOD PRESSURE: 124 MMHG | OXYGEN SATURATION: 96 % | HEART RATE: 97 BPM | RESPIRATION RATE: 16 BRPM

## 2023-11-13 PROCEDURE — G0151 HHCP-SERV OF PT,EA 15 MIN: HCPCS

## 2023-11-13 PROCEDURE — G0153 HHCP-SVS OF S/L PATH,EA 15MN: HCPCS

## 2023-11-13 PROCEDURE — G0495 RN CARE TRAIN/EDU IN HH: HCPCS

## 2023-11-13 NOTE — Clinical Note
ST evaluation only at this time.  Pt. scored WNL for cognitive communication skills, she is able to make her complex thoughts/opinions known at this time and is able to tolerate a regular diet with thin liquids.  No further services indicated.

## 2023-11-14 NOTE — HOME HEALTH
Routine Visit Note:    Skill/education provided: T/I medication.    Patient/caregiver response: Participated in conversation regarding medications.    Plan for next visit: T/I medication. Assess psychosocial status.    Other pertinent info: N/A

## 2023-11-14 NOTE — HOME HEALTH
ST evaluation completed ont this date.  Pt. is able to communiate her wants/needs to family and freiends with 100% accuracy.  Pt. scored WNL for cognitive communication skills at this time.  No further ST services indicated.

## 2023-11-16 DIAGNOSIS — K21.00 GASTROESOPHAGEAL REFLUX DISEASE WITH ESOPHAGITIS WITHOUT HEMORRHAGE: Chronic | ICD-10-CM

## 2023-11-16 RX ORDER — OMEPRAZOLE 40 MG/1
CAPSULE, DELAYED RELEASE ORAL
Qty: 30 CAPSULE | Refills: 3 | Status: SHIPPED | OUTPATIENT
Start: 2023-11-16

## 2023-11-20 ENCOUNTER — HOME CARE VISIT (OUTPATIENT)
Dept: HOME HEALTH SERVICES | Facility: HOME HEALTHCARE | Age: 73
End: 2023-11-20
Payer: MEDICARE

## 2023-11-20 VITALS
SYSTOLIC BLOOD PRESSURE: 119 MMHG | OXYGEN SATURATION: 98 % | RESPIRATION RATE: 16 BRPM | DIASTOLIC BLOOD PRESSURE: 70 MMHG | HEART RATE: 89 BPM

## 2023-11-20 PROCEDURE — G0495 RN CARE TRAIN/EDU IN HH: HCPCS

## 2023-11-20 PROCEDURE — G0151 HHCP-SERV OF PT,EA 15 MIN: HCPCS

## 2023-11-20 NOTE — HOME HEALTH
"Routine Visit Note:    Skill/education provided: gait training, LE strengthening    Patient/caregiver response: tolerated session without complaints     Plan for next visit: continue to progress as tolerated    Other pertinent info: pt reports \"just not having a good day.\" She states her Lyme disease is making her \"so tired and weak.\""

## 2023-11-21 VITALS
HEART RATE: 97 BPM | RESPIRATION RATE: 18 BRPM | TEMPERATURE: 97.9 F | OXYGEN SATURATION: 96 % | SYSTOLIC BLOOD PRESSURE: 118 MMHG | DIASTOLIC BLOOD PRESSURE: 59 MMHG

## 2023-11-21 NOTE — HOME HEALTH
Routine Visit Note:    Skill/education provided: T/I medications. Assess cardio/pulmonary s/s. T/I fall safety.    Patient/caregiver response: Verbalized understanding. Requires ongoing eduation for medication.    Plan for next visit: T/I medications. Assess cardio/pulmonary s/s.    Other pertinent info: 11/27/23 last SNV. DC vs Add visits.

## 2023-11-27 ENCOUNTER — HOME CARE VISIT (OUTPATIENT)
Dept: HOME HEALTH SERVICES | Facility: HOME HEALTHCARE | Age: 73
End: 2023-11-27
Payer: MEDICARE

## 2023-11-27 VITALS
RESPIRATION RATE: 16 BRPM | SYSTOLIC BLOOD PRESSURE: 113 MMHG | DIASTOLIC BLOOD PRESSURE: 54 MMHG | HEART RATE: 87 BPM | OXYGEN SATURATION: 95 %

## 2023-11-27 PROCEDURE — G0495 RN CARE TRAIN/EDU IN HH: HCPCS

## 2023-11-27 PROCEDURE — G0151 HHCP-SERV OF PT,EA 15 MIN: HCPCS

## 2023-11-27 NOTE — HOME HEALTH
Routine Visit Note:    Skill/education provided: gait training in home, LE strengthening    Patient/caregiver response: tolerated session well; did have some c/o dizziness    Plan for next visit: continue to progress as tolerated per current POC    Other pertinent info: n/a

## 2023-11-28 VITALS
TEMPERATURE: 97.7 F | HEART RATE: 82 BPM | DIASTOLIC BLOOD PRESSURE: 72 MMHG | SYSTOLIC BLOOD PRESSURE: 109 MMHG | RESPIRATION RATE: 18 BRPM | OXYGEN SATURATION: 97 %

## 2023-11-30 ENCOUNTER — HOME CARE VISIT (OUTPATIENT)
Dept: HOME HEALTH SERVICES | Facility: HOME HEALTHCARE | Age: 73
End: 2023-11-30
Payer: MEDICARE

## 2023-11-30 VITALS
DIASTOLIC BLOOD PRESSURE: 72 MMHG | HEART RATE: 76 BPM | RESPIRATION RATE: 16 BRPM | SYSTOLIC BLOOD PRESSURE: 128 MMHG | OXYGEN SATURATION: 97 %

## 2023-11-30 PROCEDURE — G0151 HHCP-SERV OF PT,EA 15 MIN: HCPCS

## 2023-11-30 NOTE — HOME HEALTH
Routine Visit Note: Pt appears to be feeling better mentally and physically this date. Greeted me at the door without AD and ambulated around throughout home without device and no LOB.    Skill/education provided: focus this date on gait training/activity tolerance    Patient/caregiver response: significant improvement in tolerance this date; no significant fatigue or SOA noted    Plan for next visit: continue to progress as tolerated    Other pertinent info: n/a

## 2023-12-01 ENCOUNTER — TELEPHONE (OUTPATIENT)
Dept: NEUROLOGY | Facility: CLINIC | Age: 73
End: 2023-12-01

## 2023-12-01 NOTE — TELEPHONE ENCOUNTER
Caller: Harley Rodriges    Relationship to patient: Self    Best call back number: [phone]    New or established patient?  [] New  [x] Established    Date of discharge: 11-5-23    Facility discharged from: [facility]    Diagnosis/Symptoms: FACIAL DROOP    Length of stay (If applicable): 27 HOURS    Specialty Only: Did you see a [facility] provider?    [x] Yes  [] No  If so, who? TOMER DILL MD    DISCHARGE STATES 1 MONTH F/U.  PT WAS SCHEDULED WITH [facility] ON 12-8-23.  PT WOULD LIKE TO GO TO THE [place] [facility] [place] LOCATION D/T BEING CLOSER.      PLEASE CALL & ADVISE

## 2023-12-04 ENCOUNTER — HOME CARE VISIT (OUTPATIENT)
Dept: HOME HEALTH SERVICES | Facility: HOME HEALTHCARE | Age: 73
End: 2023-12-04
Payer: MEDICARE

## 2023-12-04 VITALS — SYSTOLIC BLOOD PRESSURE: 145 MMHG | DIASTOLIC BLOOD PRESSURE: 85 MMHG | OXYGEN SATURATION: 97 % | HEART RATE: 81 BPM

## 2023-12-04 PROCEDURE — G0151 HHCP-SERV OF PT,EA 15 MIN: HCPCS

## 2023-12-04 NOTE — HOME HEALTH
Routine Visit Note:    Skill/education provided: Pt participated in PT car transfer training, gait training and BLE ther-ex this date, see interventions. Pt reports 5/10 generalized pain today. Pt reported that she was released to drive yesterday (12/3) and would like to practice car transfer and getting her rollator in/out of her car this date.    Patient/caregiver response: Pt responded well to the PT treatment interventions this date, demonstrated good safety awareness with stairs, car transfer gait as well as exercise performance. Demonstrated occasional dizziness throughout treatment session that resolved during seated rest breaks.    Plan for next visit: Plan to re-assess for continued skilled needs versus discharge.     Other pertinent info: Pt reports she was released to drive yesterday 12/3/23

## 2023-12-05 ENCOUNTER — HOME CARE VISIT (OUTPATIENT)
Dept: HOME HEALTH SERVICES | Facility: HOME HEALTHCARE | Age: 73
End: 2023-12-05
Payer: MEDICARE

## 2023-12-05 VITALS
HEART RATE: 75 BPM | SYSTOLIC BLOOD PRESSURE: 130 MMHG | OXYGEN SATURATION: 98 % | RESPIRATION RATE: 16 BRPM | TEMPERATURE: 98.9 F | DIASTOLIC BLOOD PRESSURE: 86 MMHG

## 2023-12-05 PROCEDURE — G0495 RN CARE TRAIN/EDU IN HH: HCPCS

## 2023-12-07 ENCOUNTER — HOME CARE VISIT (OUTPATIENT)
Dept: HOME HEALTH SERVICES | Facility: HOME HEALTHCARE | Age: 73
End: 2023-12-07
Payer: MEDICARE

## 2023-12-07 VITALS
SYSTOLIC BLOOD PRESSURE: 131 MMHG | OXYGEN SATURATION: 95 % | HEART RATE: 76 BPM | RESPIRATION RATE: 16 BRPM | DIASTOLIC BLOOD PRESSURE: 88 MMHG

## 2023-12-07 PROCEDURE — G0151 HHCP-SERV OF PT,EA 15 MIN: HCPCS

## 2023-12-07 NOTE — HOME HEALTH
Routine Visit Note:    Skill/education provided: T/I A-fib. Assess psychosocial s/s.    Patient/caregiver response: Verbalized understanding.    Plan for next visit: T/I A-fib. Assess psychososical s/s.    Other pertinent info: N/A

## 2023-12-07 NOTE — HOME HEALTH
"PT Reassessment performed. Pt limited this date due to \"currently in an episode from Lyme.\" Pt states when these episodes occur she just feels \"very weak and fatigued.\" Pt has shown good progress overall with therapy interventions and reports daily compliance with exercise. She has been able to walk multiple blocks outside with rollator and supervision and navigate steps to enter/exit home with rollator. Last visit she practiced putting rollator in/out of car. Plan is to d/c to HEP next visit."

## 2023-12-11 ENCOUNTER — HOME CARE VISIT (OUTPATIENT)
Dept: HOME HEALTH SERVICES | Facility: HOME HEALTHCARE | Age: 73
End: 2023-12-11
Payer: MEDICARE

## 2023-12-11 VITALS
RESPIRATION RATE: 16 BRPM | DIASTOLIC BLOOD PRESSURE: 86 MMHG | SYSTOLIC BLOOD PRESSURE: 128 MMHG | HEART RATE: 75 BPM | OXYGEN SATURATION: 96 %

## 2023-12-11 PROCEDURE — G0495 RN CARE TRAIN/EDU IN HH: HCPCS

## 2023-12-11 PROCEDURE — G0151 HHCP-SERV OF PT,EA 15 MIN: HCPCS

## 2023-12-12 VITALS
OXYGEN SATURATION: 95 % | SYSTOLIC BLOOD PRESSURE: 130 MMHG | DIASTOLIC BLOOD PRESSURE: 71 MMHG | HEART RATE: 78 BPM | TEMPERATURE: 98 F | RESPIRATION RATE: 18 BRPM

## 2023-12-12 NOTE — HOME HEALTH
Discharge Decision:  Discharge    Plan for discharge: Discharge to home/self care with all goals met.    Barriers to discharge: None    Ongoing needs: None    Any referrals needed: No    Any declines in outcomes: discuss and document reason/Order received to discharge without goals met? No declines. All goals met.    Teaching needed prior to discharge: No    Assign DC notice responsibility: SN    Assign OASIS discharge responsibility:

## 2024-02-15 ENCOUNTER — TRANSCRIBE ORDERS (OUTPATIENT)
Dept: ADMINISTRATIVE | Facility: HOSPITAL | Age: 74
End: 2024-02-15
Payer: MEDICARE

## 2024-02-15 DIAGNOSIS — Z12.31 BREAST CANCER SCREENING BY MAMMOGRAM: Primary | ICD-10-CM

## 2024-05-29 ENCOUNTER — HOSPITAL ENCOUNTER (OUTPATIENT)
Dept: MAMMOGRAPHY | Facility: HOSPITAL | Age: 74
Discharge: HOME | End: 2024-05-29
Admitting: FAMILY MEDICINE
Payer: MEDICARE

## 2024-05-29 DIAGNOSIS — Z12.31 BREAST CANCER SCREENING BY MAMMOGRAM: ICD-10-CM

## 2024-05-29 PROCEDURE — 77067 SCR MAMMO BI INCL CAD: CPT

## 2024-05-29 PROCEDURE — 77063 BREAST TOMOSYNTHESIS BI: CPT

## 2024-05-30 ENCOUNTER — OFFICE VISIT (OUTPATIENT)
Dept: PULMONOLOGY | Facility: CLINIC | Age: 74
End: 2024-05-30
Payer: MEDICARE

## 2024-05-30 VITALS
DIASTOLIC BLOOD PRESSURE: 84 MMHG | SYSTOLIC BLOOD PRESSURE: 148 MMHG | WEIGHT: 214 LBS | BODY MASS INDEX: 36.54 KG/M2 | HEART RATE: 85 BPM | RESPIRATION RATE: 18 BRPM | OXYGEN SATURATION: 95 % | HEIGHT: 64 IN

## 2024-05-30 DIAGNOSIS — E66.9 OBESITY (BMI 30-39.9): ICD-10-CM

## 2024-05-30 DIAGNOSIS — Z86.79 HISTORY OF ATRIAL FIBRILLATION: ICD-10-CM

## 2024-05-30 DIAGNOSIS — G47.33 OBSTRUCTIVE SLEEP APNEA: Primary | ICD-10-CM

## 2024-05-30 DIAGNOSIS — G47.19 EXCESSIVE DAYTIME SLEEPINESS: ICD-10-CM

## 2024-05-30 DIAGNOSIS — R06.83 SNORING: ICD-10-CM

## 2024-05-30 PROCEDURE — 99204 OFFICE O/P NEW MOD 45 MIN: CPT | Performed by: INTERNAL MEDICINE

## 2024-05-30 RX ORDER — METOPROLOL SUCCINATE 25 MG/1
TABLET, EXTENDED RELEASE ORAL
COMMUNITY

## 2024-05-30 RX ORDER — MECLIZINE HYDROCHLORIDE 25 MG/1
25 TABLET ORAL 3 TIMES DAILY PRN
COMMUNITY

## 2024-05-30 RX ORDER — ROSUVASTATIN CALCIUM 10 MG/1
TABLET, COATED ORAL
COMMUNITY

## 2024-05-30 RX ORDER — LEVOTHYROXINE SODIUM 0.1 MG/1
TABLET ORAL
COMMUNITY
Start: 2024-05-25

## 2024-05-30 NOTE — PROGRESS NOTES
CONSULT NOTE    Requested by:   Bam German PA Catron, Mark Otis, PA      Chief Complaint   Patient presents with    Sleeping Problem    Consult       Subjective:  Harley Rodriges is a 73 y.o. female.   Patient comes in today for consultation because of sleep apnea.  The patient says that  she was diagnosed with sleep apnea 24 years ago.      It appears that she was on a PAP device till 10 years ago.     she was able to use the PAP device for many years but has not used it for more than 2 years. she stopped using the device due to issues with the device and maybe the pressure.     Although she does not feel that all her symptoms from before the diagnosis being established have returned, she is noticing renewed tendency to feel sleepy     she is also snoring without his device. she is not complaining of occasional headaches.    Patient's sleep schedule was reviewed. she goes to bed around 11 PM and wakes up in the morning around 9 AM. Due to a recurrence of lyme disease, she has been sleeping more during the day.     she drinks 1-2 cups/cans of caffeinated drinks per day.     she does have a family history of SUPA, in her father & sibling    Patient is under management for hypertension & diabetes.  she had lost 40 lbs since 2023.      The following portions of the patient's history were reviewed and updated as appropriate: allergies, current medications, past family history, past medical history, past social history, and past surgical history.    Review of Systems   HENT:  Negative for sinus pressure, sneezing and sore throat.    Respiratory:  Negative for cough, chest tightness, shortness of breath and wheezing.    Cardiovascular:  Negative for palpitations and leg swelling.   Psychiatric/Behavioral:  Positive for sleep disturbance.    All other systems reviewed and are negative.      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    Colon polyp 2016    Coronary artery disease 2016    Bradycardia    COVID-19 vaccine series completed     Moderna x2, plus a booster    Diabetes mellitus     Diabetic polyneuropathy     Disease of thyroid gland     Duodenal ulcer 7/7/2022    Ear piercing     Elevated cholesterol     Erosive gastropathy 7/7/2022    Esophagitis     Fatty liver 2010    Fibromyalgia     Fibromyalgia     Gastritis     GERD (gastroesophageal reflux disease)     History of colon polyps     History of nuclear stress test 2013    Hyperlipidemia     Hypertension     Iron deficiency     Irritable bowel syndrome 2015    [place] grade C esophagitis 7/7/2022    Lyme disease 2016    Problems with swallowing     food and liquids    Rheumatoid arthritis     Seasonal allergies     Sleep apnea     no CPAP    Ulcer 2022    Endoscopy recent    Ulcer of esophagus without bleeding 7/7/2022    Urinary frequency     Wears glasses     for reading       Social History     Tobacco Use    Smoking status: Never    Smokeless tobacco: Never   Substance Use Topics    Alcohol use: Never         Objective:  Visit Vitals  /84   Pulse 85   Resp 18   Ht 162.6 cm (64\") Comment: pt reported   Wt 97.1 kg (214 lb)   SpO2 95%   BMI 36.73 kg/m²       Physical Exam  Vitals reviewed.   Constitutional:       Appearance: She is well-developed.   HENT:      Head: Atraumatic.      Mouth/Throat:      Mouth: Mucous membranes are moist.      Comments: Oropharynx was crowded.  Eyes:      Pupils: Pupils are equal, round, and reactive to light.   Neck:      Thyroid: No thyromegaly.      Vascular: No JVD.      Trachea: No tracheal deviation.   Cardiovascular:      Rate and Rhythm: Normal rate and regular rhythm.   Pulmonary:      Effort: Pulmonary effort is normal. No respiratory distress.      Breath sounds: Normal breath sounds. No wheezing.   Musculoskeletal:      Right lower leg: No edema.      Left lower leg: No edema.      Comments: Used a walker.    Skin:     " General: Skin is warm and dry.   Neurological:      Mental Status: She is alert and oriented to person, place, and time.         Assessment/Plan:  Diagnoses and all orders for this visit:    1. Obstructive sleep apnea (Primary)  -     Polysomnography 4 or More Parameters; Future    2. Snoring  -     Polysomnography 4 or More Parameters; Future    3. Excessive daytime sleepiness  -     Polysomnography 4 or More Parameters; Future    4. Obesity (BMI 30-39.9)  -     Polysomnography 4 or More Parameters; Future    5. History of atrial fibrillation  -     Polysomnography 4 or More Parameters; Future        Return in about 21 weeks (around 10/24/2024) for SleepONLY/Julissa, ....Also 10 mths w/ Dr. Christie.    DISCUSSION(if any):  I also reviewed her last echocardiogram and shared the results with her.   Results for orders placed during the hospital encounter of 11/04/23    Adult Transthoracic Echo Complete W/ Cont if Necessary Per Protocol (With Agitated Saline)    Interpretation Summary    Left ventricular ejection fraction appears to be 56 - 60%.    Left ventricular diastolic function is consistent with (grade I) impaired relaxation.    The left atrial cavity is dilated.    Saline test results are negative for right to left atrial level shunt.    There is calcification of the aortic valve.  No stenosis.    Estimated right ventricular systolic pressure from tricuspid regurgitation is normal (<35 mmHg).      ===========================  ===========================    Laboratory workup also showed   Lab Results   Component Value Date    HGB 10.7 (L) 11/05/2023    HGB 11.5 (L) 11/04/2023    HGB 10.7 (L) 09/02/2023   ,   Lab Results   Component Value Date    HCT 32.9 (L) 11/05/2023    HCT 34.7 11/04/2023    HCT 32 (L) 09/02/2023       Lab Results   Component Value Date    EOSABS 0.26 11/05/2023    EOSABS 0.18 11/04/2023    EOSABS 110 09/02/2023    & Laboratory workup also showed   Lab Results   Component Value Date    CO2 27.1  11/05/2023   ,   Lab Results   Component Value Date    HGBA1C 6.70 (H) 11/05/2023     ===========================  ===========================    Sleep questionnaire was provided to the patient    The pathophysiology of sleep apnea was discussed, with her.     We will encourage her to schedule the split night study soon.     The patient will definitely need to be considered for an in lab study due to atrial fibrillation and issues with using CPAP in the past. among other reasons.  It should be noted that a home sleep study is likely to underestimate the true AHI and is unable to assess sleep stages and abnormal sleep behaviors etc. The patient has understood.    The patient is agreeable to try CPAP/BiPAP, if needed.     Patient was educated on good sleep hygiene measures and voiced understanding of the same.     Patient was given reading material regarding sleep apnea.        Dictated utilizing Dragon dictation.    This document was electronically signed by Mayte Christie MD on 05/30/24 at 14:50 EDT

## 2024-05-31 ENCOUNTER — PATIENT ROUNDING (BHMG ONLY) (OUTPATIENT)
Dept: PULMONOLOGY | Facility: CLINIC | Age: 74
End: 2024-05-31
Payer: MEDICARE

## 2024-05-31 DIAGNOSIS — K21.00 GASTROESOPHAGEAL REFLUX DISEASE WITH ESOPHAGITIS WITHOUT HEMORRHAGE: Chronic | ICD-10-CM

## 2024-05-31 RX ORDER — OMEPRAZOLE 40 MG/1
CAPSULE, DELAYED RELEASE ORAL
Qty: 30 CAPSULE | Refills: 1 | Status: SHIPPED | OUTPATIENT
Start: 2024-05-31

## 2024-05-31 NOTE — TELEPHONE ENCOUNTER
I sent refill. Schedule GI appt for further refills, has not been seen in over 1 year. If no GI appt needed, can get from PCP.   Right arm;

## 2024-06-20 ENCOUNTER — HOSPITAL ENCOUNTER (OUTPATIENT)
Dept: SLEEP MEDICINE | Facility: HOSPITAL | Age: 74
End: 2024-06-20
Payer: MEDICARE

## 2024-06-20 DIAGNOSIS — G47.19 EXCESSIVE DAYTIME SLEEPINESS: ICD-10-CM

## 2024-06-20 DIAGNOSIS — R06.83 SNORING: ICD-10-CM

## 2024-06-20 DIAGNOSIS — G47.33 OBSTRUCTIVE SLEEP APNEA: ICD-10-CM

## 2024-06-20 DIAGNOSIS — Z86.79 HISTORY OF ATRIAL FIBRILLATION: ICD-10-CM

## 2024-06-20 DIAGNOSIS — E66.9 OBESITY (BMI 30-39.9): ICD-10-CM

## 2024-06-20 PROCEDURE — 95810 POLYSOM 6/> YRS 4/> PARAM: CPT

## 2024-09-25 ENCOUNTER — OFFICE VISIT (OUTPATIENT)
Dept: NEUROLOGY | Facility: CLINIC | Age: 74
End: 2024-09-25
Payer: MEDICARE

## 2024-09-25 ENCOUNTER — LAB (OUTPATIENT)
Dept: LAB | Facility: HOSPITAL | Age: 74
End: 2024-09-25
Payer: MEDICARE

## 2024-09-25 VITALS
OXYGEN SATURATION: 97 % | WEIGHT: 212.3 LBS | DIASTOLIC BLOOD PRESSURE: 78 MMHG | RESPIRATION RATE: 14 BRPM | HEIGHT: 64 IN | HEART RATE: 85 BPM | SYSTOLIC BLOOD PRESSURE: 132 MMHG | TEMPERATURE: 96.3 F | BODY MASS INDEX: 36.25 KG/M2

## 2024-09-25 DIAGNOSIS — R41.89 SUBJECTIVE MEMORY COMPLAINTS: ICD-10-CM

## 2024-09-25 DIAGNOSIS — R55 SYNCOPE, UNSPECIFIED SYNCOPE TYPE: Primary | ICD-10-CM

## 2024-09-25 LAB
AMMONIA BLD-SCNC: 11 UMOL/L (ref 11–51)
FOLATE SERPL-MCNC: >20 NG/ML (ref 4.78–24.2)
RPR SER QL: NORMAL
VIT B12 BLD-MCNC: >2000 PG/ML (ref 211–946)

## 2024-09-25 PROCEDURE — 1159F MED LIST DOCD IN RCRD: CPT | Performed by: NURSE PRACTITIONER

## 2024-09-25 PROCEDURE — 82175 ASSAY OF ARSENIC: CPT

## 2024-09-25 PROCEDURE — 83825 ASSAY OF MERCURY: CPT

## 2024-09-25 PROCEDURE — 82607 VITAMIN B-12: CPT

## 2024-09-25 PROCEDURE — 1160F RVW MEDS BY RX/DR IN RCRD: CPT | Performed by: NURSE PRACTITIONER

## 2024-09-25 PROCEDURE — 83655 ASSAY OF LEAD: CPT

## 2024-09-25 PROCEDURE — 82300 ASSAY OF CADMIUM: CPT

## 2024-09-25 PROCEDURE — 82746 ASSAY OF FOLIC ACID SERUM: CPT

## 2024-09-25 PROCEDURE — 36415 COLL VENOUS BLD VENIPUNCTURE: CPT

## 2024-09-25 PROCEDURE — 99214 OFFICE O/P EST MOD 30 MIN: CPT | Performed by: NURSE PRACTITIONER

## 2024-09-25 PROCEDURE — 82140 ASSAY OF AMMONIA: CPT

## 2024-09-25 PROCEDURE — 86592 SYPHILIS TEST NON-TREP QUAL: CPT

## 2024-09-27 LAB
ARSENIC BLD-MCNC: 3 UG/L (ref 0–9)
CADMIUM BLD-MCNC: <0.5 UG/L (ref 0–1.2)
LEAD BLDV-MCNC: <1 UG/DL (ref 0–3.4)
MERCURY BLD-MCNC: <1 UG/L (ref 0–14.9)

## 2024-09-30 ENCOUNTER — PATIENT ROUNDING (BHMG ONLY) (OUTPATIENT)
Dept: NEUROLOGY | Facility: CLINIC | Age: 74
End: 2024-09-30
Payer: MEDICARE

## 2024-10-03 ENCOUNTER — HOSPITAL ENCOUNTER (OUTPATIENT)
Dept: SLEEP MEDICINE | Facility: HOSPITAL | Age: 74
Discharge: HOME | End: 2024-10-03
Admitting: NURSE PRACTITIONER
Payer: MEDICARE

## 2024-10-03 DIAGNOSIS — R55 SYNCOPE, UNSPECIFIED SYNCOPE TYPE: ICD-10-CM

## 2024-10-03 PROCEDURE — 95816 EEG AWAKE AND DROWSY: CPT | Performed by: PSYCHIATRY & NEUROLOGY

## 2024-10-03 PROCEDURE — 95816 EEG AWAKE AND DROWSY: CPT

## 2024-10-09 ENCOUNTER — OFFICE VISIT (OUTPATIENT)
Dept: FAMILY MEDICINE CLINIC | Facility: CLINIC | Age: 74
End: 2024-10-09
Payer: MEDICARE

## 2024-10-09 VITALS
WEIGHT: 214 LBS | OXYGEN SATURATION: 98 % | HEART RATE: 68 BPM | DIASTOLIC BLOOD PRESSURE: 100 MMHG | HEIGHT: 64 IN | BODY MASS INDEX: 36.54 KG/M2 | SYSTOLIC BLOOD PRESSURE: 150 MMHG

## 2024-10-09 DIAGNOSIS — G89.29 CHRONIC BILATERAL LOW BACK PAIN WITHOUT SCIATICA: ICD-10-CM

## 2024-10-09 DIAGNOSIS — Z59.71 MEDICATION NOT COVERED BY INSURANCE: ICD-10-CM

## 2024-10-09 DIAGNOSIS — G62.9 PERIPHERAL POLYNEUROPATHY: ICD-10-CM

## 2024-10-09 DIAGNOSIS — E03.9 HYPOTHYROIDISM, UNSPECIFIED TYPE: ICD-10-CM

## 2024-10-09 DIAGNOSIS — M79.7 FIBROMYALGIA: ICD-10-CM

## 2024-10-09 DIAGNOSIS — E11.40 TYPE 2 DIABETES MELLITUS WITH DIABETIC NEUROPATHY, WITHOUT LONG-TERM CURRENT USE OF INSULIN: Primary | ICD-10-CM

## 2024-10-09 DIAGNOSIS — M54.50 CHRONIC BILATERAL LOW BACK PAIN WITHOUT SCIATICA: ICD-10-CM

## 2024-10-09 LAB
EXPIRATION DATE: ABNORMAL
HBA1C MFR BLD: 7.6 % (ref 4.5–5.7)
Lab: ABNORMAL

## 2024-10-09 PROCEDURE — 99214 OFFICE O/P EST MOD 30 MIN: CPT | Performed by: NURSE PRACTITIONER

## 2024-10-09 PROCEDURE — 3051F HG A1C>EQUAL 7.0%<8.0%: CPT | Performed by: NURSE PRACTITIONER

## 2024-10-09 PROCEDURE — 1160F RVW MEDS BY RX/DR IN RCRD: CPT | Performed by: NURSE PRACTITIONER

## 2024-10-09 PROCEDURE — 1125F AMNT PAIN NOTED PAIN PRSNT: CPT | Performed by: NURSE PRACTITIONER

## 2024-10-09 PROCEDURE — 83036 HEMOGLOBIN GLYCOSYLATED A1C: CPT | Performed by: NURSE PRACTITIONER

## 2024-10-09 PROCEDURE — 1159F MED LIST DOCD IN RCRD: CPT | Performed by: NURSE PRACTITIONER

## 2024-10-09 RX ORDER — THYROID 60 MG/1
60 TABLET ORAL DAILY
COMMUNITY
End: 2024-10-09

## 2024-10-09 RX ORDER — PREGABALIN 50 MG/1
50 CAPSULE ORAL 2 TIMES DAILY
Qty: 60 CAPSULE | Refills: 1 | Status: SHIPPED | OUTPATIENT
Start: 2024-10-09

## 2024-10-09 RX ORDER — CELECOXIB 200 MG/1
CAPSULE ORAL
COMMUNITY
Start: 2024-09-15

## 2024-10-09 NOTE — PROGRESS NOTES
New Patient History and Physical      Referring Physician: No ref. provider found    Subjective     Chief Complaint:    Chief Complaint   Patient presents with    Hypertension    Hypothyroidism    Atrial Fibrillation       History of Present Illness:   Here as a new patient, prior patient of yash davila   [place] on eliquis, is having trouble affording, follows with Dr Waldrop, statin  Has been on liptior and crestor   Hypothyroidism, on NP thyroid  Diabetes, on metformin, would like to get off if she could   Back pain, neuropathy in feet and legs   Fibro cymbalta  RA does not take anything for it other than tylenol         Review of Systems   Gen- No fevers, chills  CV- No chest pain, palpitations  Resp- No cough, dyspnea  GI- No N/V/D, abd pain  Neuro-No dizziness, headaches    Past Medical History:   Past Medical History:   Diagnosis Date    Anemia 1966    Anxiety and depression     Atrial fibrillation November 2023    Bell palsy 2017.     11/2023    Twice    Bradycardia     Cancer 2019    Squamous cell nose    Cataract 2022    Colon polyp 2016    Coronary artery disease 2016    Bradycardia    COVID-19 vaccine series completed     Moderna x2, plus a booster    Diabetes mellitus     Diabetic polyneuropathy     Difficulty walking 2020    Disease of thyroid gland     Duodenal ulcer 07/07/2022    Ear piercing     Elevated cholesterol     Erosive gastropathy 07/07/2022    Esophagitis     Fatty liver 2010    Fibromyalgia     Fibromyalgia     Fibromyalgia, primary 2000    Gastritis     GERD (gastroesophageal reflux disease)     Head injury 1967    Concussion    Headache, tension-type 1966    History of colon polyps     History of nuclear stress test 2013    HL (hearing loss) 2020    Hearing aids 2023    Hyperlipidemia     Hypertension     Hypothyroidism 2010    Iron deficiency     Irritable bowel syndrome 2015    [place] grade C esophagitis 07/07/2022    Lyme disease 2016    Memory loss 2015    Migraine 1970     Obesity 1973    Pneumonia     Problems with swallowing     food and liquids    Rheumatoid arthritis     Seasonal allergies     Shingles 2016    Sleep apnea     no CPAP    Tremor 2022    Ulcer 2022    Endoscopy recent    Ulcer of esophagus without bleeding 07/07/2022    Urinary frequency     Vision loss 2023    Cataracts/floaters    Wears glasses     for reading       Past Surgical History:  Past Surgical History:   Procedure Laterality Date    APPENDECTOMY      1980    BREAST LUMPECTOMY WITH SENTINEL NODE BIOPSY AND AXILLARY NODE DISSECTION      1981-benign    BREAST SURGERY  1980    Benign RT    CARDIAC CATHETERIZATION      2015- no stents    COLONOSCOPY  2016    COLONOSCOPY N/A 01/15/2021    Procedure: COLONOSCOPY WITH BIOSPIES AND POLYPECTOMY;  Surgeon: Yuridia Vinson MD;  Location: [facility] ENDOSCOPY;  Service: Gastroenterology;  Laterality: N/A;    COLONOSCOPY W/ BIOPSIES  2016    Dr. Cason    ENDOSCOPY  2016    Dr. Cason    ENDOSCOPY N/A 05/12/2022    Procedure: ESOPHAGOGASTRODUODENOSCOPY WITH BIOPSY;  Surgeon: Yuridia Vinson MD;  Location: [facility] ENDOSCOPY;  Service: Gastroenterology;  Laterality: N/A;    ENDOSCOPY N/A 01/13/2023    Procedure: ESOPHAGOGASTRODUODENOSCOPY, biopsy;  Surgeon: Yuridia Vinson MD;  Location: [facility] ENDOSCOPY;  Service: Gastroenterology;  Laterality: N/A;    HYSTERECTOMY      1980    TONSILLECTOMY      1956 at age 10    UPPER GASTROINTESTINAL ENDOSCOPY  2016    WISDOM TOOTH EXTRACTION         Family History: family history includes Alcohol abuse in her maternal uncle; Anxiety disorder in her mother; Arthritis in her father; Cancer in her maternal uncle, paternal aunt, and paternal uncle; Dementia in her father; Diabetes in her brother and paternal uncle; Hearing loss in her brother, brother, father, and mother; Heart disease in her brother, father, and mother; Hypertension in her brother, father, and mother; Mental retardation in her paternal uncle; Neuropathy  in her father; Polymyalgia rheumatica in her father; Skin cancer in her father and mother; Vision loss in her father.    Social History:  reports that she has never smoked. She has never been exposed to tobacco smoke. She has never used smokeless tobacco. She reports that she does not drink alcohol and does not use drugs.    Medications:    Current Outpatient Medications:     acetaminophen (TYLENOL) 500 MG tablet, Take 1 tablet by mouth Every 6 (Six) Hours As Needed for Mild Pain. Indications: Pain, Disp: , Rfl:     apixaban (ELIQUIS) 5 MG tablet tablet, Take 1 tablet by mouth Every 12 (Twelve) Hours. Indications: Atrial Fibrillation, Disp: 60 tablet, Rfl: 0    celecoxib (CeleBREX) 200 MG capsule, , Disp: , Rfl:     dicyclomine (BENTYL) 20 MG tablet, Take 1 tablet by mouth 3 (Three) Times a Day As Needed (lower abdominal pain and diarrhea). (Patient taking differently: Take 1 tablet by mouth As Needed (lower abdominal pain and diarrhea).), Disp: 30 tablet, Rfl: 1    DULoxetine (CYMBALTA) 60 MG capsule, Take 1 capsule by mouth 2 (Two) Times a Day. Indications: Peripheral Nerve Disease, Disp: , Rfl:     estradiol (ESTRACE) 0.5 MG tablet, Take 1 tablet by mouth Daily. Indications: Hair Loss, Disp: , Rfl:     loratadine (CLARITIN) 10 MG tablet, Take 1 tablet by mouth Daily. Indications: Hayfever, Disp: , Rfl:     losartan (COZAAR) 50 MG tablet, Take 1 tablet by mouth Daily. Indications: High Blood Pressure, Disp: , Rfl:     meclizine (ANTIVERT) 25 MG tablet, Take 1 tablet by mouth 3 (Three) Times a Day As Needed for Dizziness., Disp: , Rfl:     metFORMIN (GLUCOPHAGE) 1000 MG tablet, Take 0.5 tablets by mouth 2 (Two) Times a Day With Meals. 500 mg in am  Indications: Type 2 Diabetes, Disp: , Rfl:     metoprolol succinate XL (TOPROL-XL) 25 MG 24 hr tablet, , Disp: , Rfl:     Multiple Vitamins-Minerals (HAIR SKIN NAILS PO), Take 3 tablets by mouth Daily. Indications: Supplement, Disp: , Rfl:     Multiple Vitamins-Minerals  "(ONE-A-DAY WOMENS 50+ PO), Take  by mouth. Indications: Supplement, Disp: , Rfl:     Niacin, Antihyperlipidemic, 500 MG tablet, Take 1 tablet by mouth Daily. Indications: supplement, Disp: , Rfl:     omeprazole (priLOSEC) 40 MG capsule, TAKE 1 CAPSULE BY MOUTH ONCE EVERY MORNING 30 MINUTES PRIOR TO BREAKFAST, Disp: 30 capsule, Rfl: 1    rosuvastatin (CRESTOR) 10 MG tablet, , Disp: , Rfl:     Thyroid 60 MG PO tablet, Take 1 tablet by mouth Daily. Indications: Underactive Thyroid, Disp: , Rfl:     traZODone (DESYREL) 100 MG tablet, Take 0.5 tablets by mouth every night at bedtime. Indications: Anxiety Disorder, Trouble Sleeping, Disp: , Rfl:     vitamin D3 125 MCG (5000 UT) capsule capsule, Take 1 capsule by mouth Daily. Indications: Supplement, Disp: , Rfl:     pregabalin (Lyrica) 50 MG capsule, Take 1 capsule by mouth 2 (Two) Times a Day., Disp: 60 capsule, Rfl: 1    Allergies:  Allergies   Allergen Reactions    Prednisone Irritability     Can take, but makes me sleepy, irritable, foggy in my head       Objective     Vital Signs:   Vitals:    10/09/24 1401   BP: 150/100   Pulse: 68   SpO2: 98%   Weight: 97.1 kg (214 lb)   Height: 162.6 cm (64\")     Body mass index is 36.73 kg/m².    Physical Exam:    Physical Exam  Constitutional:       Appearance: Normal appearance. She is well-developed.   HENT:      Head: Normocephalic and atraumatic.      Right Ear: Tympanic membrane, ear canal and external ear normal.      Left Ear: Tympanic membrane, ear canal and external ear normal.      Nose: Nose normal.      Mouth/Throat:      Pharynx: Uvula midline.   Eyes:      Pupils: Pupils are equal, round, and reactive to light.   Cardiovascular:      Rate and Rhythm: Normal rate and regular rhythm.      Heart sounds: Normal heart sounds. No murmur heard.     No friction rub. No gallop.   Pulmonary:      Effort: Pulmonary effort is normal.      Breath sounds: Normal breath sounds.   Abdominal:      General: Bowel sounds are normal. "      Palpations: Abdomen is soft.      Tenderness: There is no abdominal tenderness.   Musculoskeletal:      Cervical back: Neck supple.   Lymphadenopathy:      Head:      Right side of head: No submental, submandibular, tonsillar, preauricular or posterior auricular adenopathy.      Left side of head: No submental, submandibular, tonsillar, preauricular or posterior auricular adenopathy.      Cervical: No cervical adenopathy.   Skin:     General: Skin is warm and dry.   Neurological:      General: No focal deficit present.      Mental Status: She is alert and oriented to person, place, and time.   Psychiatric:         Mood and Affect: Mood normal.         Behavior: Behavior normal.         Assessment / Plan     Assessment/Plan:   Problem List Items Addressed This Visit       Diabetic peripheral neuropathy associated with type 2 diabetes mellitus - Primary    Relevant Medications    metFORMIN (GLUCOPHAGE) 1000 MG tablet     Other Visit Diagnoses       Type 2 diabetes mellitus with diabetic neuropathy, without long-term current use of insulin        Relevant Orders    POC Glycosylated Hemoglobin (Hb A1C)    Microalbumin / Creatinine Urine Ratio - Urine, Clean Catch    Hypothyroidism, unspecified type        Relevant Orders    TSH    Fibromyalgia        Relevant Medications    pregabalin (Lyrica) 50 MG capsule    Chronic bilateral low back pain without sciatica        Relevant Medications    pregabalin (Lyrica) 50 MG capsule    Peripheral polyneuropathy        Relevant Medications    pregabalin (Lyrica) 50 MG capsule    Medication not covered by insurance        Relevant Orders    Ambulatory Referral to Social Care Services (Amb Case Mgmt)          -- refer to social work  -- discussed lyrica   -- continue other meds  -- we discussed duplicate meds as well       Discussed plan of care in detail with pt today; pt verb understanding and agrees.    I have reviewed pertinent health maintenance applicable to this  patient.    Follow up:  Return in about 6 weeks (around 11/20/2024).    Electronically signed by AUSTIN Hickey   10/09/2024 14:14 EDT      Please note that portions of this note were completed with a voice recognition program.

## 2024-10-10 ENCOUNTER — REFERRAL TRIAGE (OUTPATIENT)
Age: 74
End: 2024-10-10
Payer: MEDICARE

## 2024-10-10 LAB — TSH SERPL DL<=0.005 MIU/L-ACNC: 0.25 UIU/ML (ref 0.27–4.2)

## 2024-10-11 ENCOUNTER — PATIENT ROUNDING (BHMG ONLY) (OUTPATIENT)
Dept: FAMILY MEDICINE CLINIC | Facility: CLINIC | Age: 74
End: 2024-10-11
Payer: MEDICARE

## 2024-10-14 ENCOUNTER — PATIENT OUTREACH (OUTPATIENT)
Age: 74
End: 2024-10-14
Payer: MEDICARE

## 2024-10-14 NOTE — OUTREACH NOTE
SW attempted contact with UTRx1. SW will attempt again in a couple of business days.     Jose COURTNEY -   Ambulatory Case Management    10/14/2024, 10:07 EDT

## 2024-10-16 ENCOUNTER — PATIENT OUTREACH (OUTPATIENT)
Age: 74
End: 2024-10-16
Payer: MEDICARE

## 2024-10-16 NOTE — OUTREACH NOTE
Social Work Assessment  Questions/Answers      Flowsheet Row Most Recent Value   Referral Source physician   Reason for Consult community resources, medication concerns   Preferred Language English   People in Home alone   Current Living Arrangements home   Potentially Unsafe Housing Conditions none   In the past 12 months has the electric, gas, oil, or water company threatened to shut off services in your home? No   Primary Care Provided by self   Provides Primary Care For no one, unable/limited ability to care for self   Quality of Family Relationships unable to assess   Source of Income social security   Financial/Environmental Concerns unable to afford medication(s)   Application for Public Assistance obtained public assistance pending number   Usual Activity Tolerance moderate   Current Activity Tolerance moderate   Medications independent   Meal Preparation independent   Housekeeping independent   Laundry independent   Shopping independent   If for any reason you need help with day-to-day activities such as bathing, preparing meals, shopping, managing finances, etc., do you get the help you need? Patient declined   Q1: How often do you have a drink containing alcohol? Never   Q2: How many drinks containing alcohol do you have on a typical day when you are drinking? None   Q3: How often do you have six or more drinks on one occasion? Never   Audit-C Score 0   Do you want help finding or keeping work or a job? I do not need or want help        SDOH updated and reviewed with the patient during this program:  --     Alcohol Use: Not At Risk (10/16/2024)    AUDIT-C     Frequency of Alcohol Consumption: Never     Average Number of Drinks: Patient does not drink     Frequency of Binge Drinking: Never      --      Received from [employer]    Depression      --     Disabilities: Not At Risk (10/16/2024)    Disabilities     Concentrating, Remembering, or Making Decisions Difficulty: no     Doing Errands  Independently Difficulty: no      --     Employment: Not At Risk (10/16/2024)    Employment     Do you want help finding or keeping work or a job?: I do not need or want help      Financial Resource Strain: Medium Risk (10/16/2024)    Overall Financial Resource Strain (CARDIA)     Difficulty of Paying Living Expenses: Somewhat hard      --     Food Insecurity: No Food Insecurity (10/16/2024)    Hunger Vital Sign     Worried About Running Out of Food in the Last Year: Never true     Ran Out of Food in the Last Year: Never true      --     Health Literacy: Unknown (10/16/2024)    Education     Preferred Language: English      --     Housing Stability: Not At Risk (10/16/2024)    Housing Stability     Current Living Arrangements: home     Potentially Unsafe Housing Conditions: none      --     Interpersonal Safety: Not At Risk (10/16/2024)    Humiliation, Afraid, Rape, and Kick questionnaire     Fear of Current or Ex-Partner: No     Emotionally Abused: No     Physically Abused: No     Sexually Abused: No        --     Social Connections: Unknown (10/16/2024)    Family and Community Support     Help with Day-to-Day Activities: Patient declined      --     Stress: Stress Concern Present (10/16/2024)    [employer] - Occupational Stress Questionnaire     Feeling of Stress : To some extent      --     Tobacco Use: Low Risk  (10/9/2024)    Patient History     Smoking Tobacco Use: Never     Smokeless Tobacco Use: Never     Passive Exposure: Never      --     Transportation Needs: No Transportation Needs (10/16/2024)    PRAPARE - Transportation     Lack of Transportation (Medical): No     Lack of Transportation (Non-Medical): No      --     Utilities: Not At Risk (10/16/2024)    [facility] Utilities     Threatened with loss of utilities: No      Continuing Care   [facility]    [address] [place] [id number]    Phone: [phone]    Request Status: Pending - No  Request Sent    Services: Financial Assistance    Resource for: Financial Resource Strain, Utilities   Patient Outreach    SW spoke with pt re her referral for Rx cost needs. Pt stated she has hit the doughnut hole and her Eliquis copay is a lot higher, as a result. SW and pt discussed that Self-pay costs on GoodRx, etc... would still be too expensive. Pt has not applied for the Extra Help program, so SW offered to send her an application for this. Pt is agreeable. SW advised pt to contact Eliquis manufaturer for any copay assistance cards, as the pt has to be the one to request those. [facility] [employer] [facility] denial of Extra Help program first, for Medicare pt. SW reviewed this with pt. Pt verbalized understanding. SW also suggested pt inquire about generic options, should she feel the need too. Pt expressive of thanks. SW will F/u in a week and a half to ensure receipt of Extra Help application.     Jose COURTNEY -   Ambulatory Case Management    10/16/2024, 12:21 EDT

## 2024-10-24 ENCOUNTER — EXTERNAL PBMM DATA (OUTPATIENT)
Dept: PHARMACY | Facility: OTHER | Age: 74
End: 2024-10-24
Payer: MEDICARE

## 2024-10-30 ENCOUNTER — PATIENT OUTREACH (OUTPATIENT)
Age: 74
End: 2024-10-30
Payer: MEDICARE

## 2024-10-30 NOTE — OUTREACH NOTE
SW attempted contact with UTRx1. SW will attempt again in a couple of business days.     Jose COURTNEY -   Ambulatory Case Management    10/30/2024, 15:55 EDT

## 2024-11-04 PROBLEM — G89.29 CHRONIC BILATERAL LOW BACK PAIN WITHOUT SCIATICA: Status: ACTIVE | Noted: 2024-11-04

## 2024-11-04 PROBLEM — M79.7 FIBROMYALGIA: Status: ACTIVE | Noted: 2024-11-04

## 2024-11-04 PROBLEM — G62.9 PERIPHERAL POLYNEUROPATHY: Status: ACTIVE | Noted: 2024-11-04

## 2024-11-04 PROBLEM — M54.50 CHRONIC BILATERAL LOW BACK PAIN WITHOUT SCIATICA: Status: ACTIVE | Noted: 2024-11-04

## 2024-11-04 PROBLEM — E03.9 HYPOTHYROIDISM: Status: ACTIVE | Noted: 2024-11-04

## 2024-11-06 ENCOUNTER — PATIENT OUTREACH (OUTPATIENT)
Age: 74
End: 2024-11-06
Payer: MEDICARE

## 2024-11-06 NOTE — OUTREACH NOTE
SW attempted contact with UTRx1. SW will attempt again in a couple of business days.     Jose COURTNEY -   Ambulatory Case Management    11/6/2024, 14:43 EST

## 2024-11-08 ENCOUNTER — PATIENT OUTREACH (OUTPATIENT)
Age: 74
End: 2024-11-08
Payer: MEDICARE

## 2024-11-08 NOTE — OUTREACH NOTE
Patient Outreach    SW F/u with pt re the receipt of materials for the Extra Help program. Pt had some questions, and SW provided her with the contact for Extra Help hotline. SW sent it to pt My Chart. Pt expressed thanks. ANGELA will D/c due to initial outreach goal being met.     Jose COURTNEY -   Ambulatory Case Management    11/8/2024, 15:26 EST

## 2024-11-14 ENCOUNTER — OFFICE VISIT (OUTPATIENT)
Dept: PULMONOLOGY | Facility: CLINIC | Age: 74
End: 2024-11-14
Payer: MEDICARE

## 2024-11-14 VITALS
HEIGHT: 64 IN | WEIGHT: 219.2 LBS | OXYGEN SATURATION: 98 % | RESPIRATION RATE: 18 BRPM | SYSTOLIC BLOOD PRESSURE: 136 MMHG | DIASTOLIC BLOOD PRESSURE: 78 MMHG | BODY MASS INDEX: 37.42 KG/M2 | HEART RATE: 94 BPM

## 2024-11-14 DIAGNOSIS — G47.33 OBSTRUCTIVE SLEEP APNEA: Primary | ICD-10-CM

## 2024-11-14 DIAGNOSIS — G47.19 EXCESSIVE DAYTIME SLEEPINESS: ICD-10-CM

## 2024-11-14 DIAGNOSIS — E66.9 OBESITY (BMI 30-39.9): ICD-10-CM

## 2024-11-14 DIAGNOSIS — I48.0 PAROXYSMAL A-FIB: ICD-10-CM

## 2024-11-14 PROCEDURE — 99213 OFFICE O/P EST LOW 20 MIN: CPT | Performed by: NURSE PRACTITIONER

## 2024-11-14 NOTE — PROGRESS NOTES
"Chief Complaint   Patient presents with    Sleeping Problem    Follow-up         Subjective   Harley Rodriges is a 74 y.o. female.   The patient comes in today for follow-up of obstructive sleep apnea.    I reviewed her sleep study and discussed the results with her today.  The sleep study revealed no sleep apnea with an apnea hypopnea index of 4 per hour.  Her apnea-hypopnea index was worse in REM sleep with AHI 12.6/h. Poor sleep efficiency noted.     She gets up multiple times a night to urinate. This is worse at the beginning of bedtime and closer to the end of bedtime.  She admits that nocturia also depends on how much liquid she has had to drink through the day.    She states she does not consume liquids after 7 PM usually.    She continues to feel unrested upon awakening.     She reports chronic pain which makes sleeping more difficult at times.     She was diagnosed with severe SUPA in 2000 and stopped using it.       The following portions of the patient's history were reviewed and updated as appropriate: allergies, current medications, past family history, past medical history, past social history, and past surgical history.      Review of Systems   HENT:  Positive for sinus pressure, sneezing and sore throat.    Respiratory:  Positive for cough, shortness of breath and wheezing. Negative for chest tightness.    Psychiatric/Behavioral:  Positive for sleep disturbance (some).        Objective   Visit Vitals  /78   Pulse 94   Resp 18   Ht 162.6 cm (64.02\") Comment: pt reported   Wt 99.4 kg (219 lb 3.2 oz)   SpO2 98%   BMI 37.61 kg/m²       Physical Exam  Vitals reviewed.   Constitutional:       Appearance: She is well-developed.   HENT:      Head: Atraumatic.      Mouth/Throat:      Mouth: Mucous membranes are moist.   Eyes:      Extraocular Movements: Extraocular movements intact.   Cardiovascular:      Rate and Rhythm: Normal rate.      Comments: Regular rhythm noted.  Abdominal:      Comments: Obese " abdomen.    Skin:     General: Skin is warm.   Neurological:      Mental Status: She is alert and oriented to person, place, and time.           Assessment & Plan   Diagnoses and all orders for this visit:    1. Obstructive sleep apnea (Primary)  -     Home Sleep Study; Future    2. Obesity (BMI 30-39.9)    3. Excessive daytime sleepiness    4. Paroxysmal A-fib           Return for keep April appt, Sleep study.    DISCUSSION (if any):  I feel a home sleep study is warranted since she has REM associated sleep apnea and new diagnosis of atrial fibrillation with a previous history of severe SUPA.  I am hopeful she will be able to have better sleep efficiency at home.    She is willing to have a home sleep study.    I have suggested she decrease fluid intake and cut off fluid intake earlier in the evening a couple of nights before the sleep study as well as the night of the sleep study.  She verbalizes understanding.      Dictated utilizing Dragon dictation.    This document was electronically signed by AUSTIN Saini November 15, 2024  10:10 EST

## 2024-11-20 ENCOUNTER — EXTERNAL PBMM DATA (OUTPATIENT)
Dept: PHARMACY | Facility: OTHER | Age: 74
End: 2024-11-20
Payer: MEDICARE

## 2024-11-25 ENCOUNTER — OFFICE VISIT (OUTPATIENT)
Dept: FAMILY MEDICINE CLINIC | Facility: CLINIC | Age: 74
End: 2024-11-25
Payer: MEDICARE

## 2024-11-25 VITALS
WEIGHT: 219 LBS | TEMPERATURE: 97.7 F | SYSTOLIC BLOOD PRESSURE: 128 MMHG | DIASTOLIC BLOOD PRESSURE: 74 MMHG | RESPIRATION RATE: 14 BRPM | HEART RATE: 91 BPM | HEIGHT: 64 IN | BODY MASS INDEX: 37.39 KG/M2 | OXYGEN SATURATION: 97 %

## 2024-11-25 DIAGNOSIS — D47.2 MGUS (MONOCLONAL GAMMOPATHY OF UNKNOWN SIGNIFICANCE): ICD-10-CM

## 2024-11-25 DIAGNOSIS — E03.9 HYPOTHYROIDISM, UNSPECIFIED TYPE: ICD-10-CM

## 2024-11-25 DIAGNOSIS — Z00.00 PREVENTATIVE HEALTH CARE: ICD-10-CM

## 2024-11-25 DIAGNOSIS — K58.0 IRRITABLE BOWEL SYNDROME WITH DIARRHEA: Chronic | ICD-10-CM

## 2024-11-25 DIAGNOSIS — M54.50 CHRONIC BILATERAL LOW BACK PAIN WITHOUT SCIATICA: ICD-10-CM

## 2024-11-25 DIAGNOSIS — M79.7 FIBROMYALGIA: ICD-10-CM

## 2024-11-25 DIAGNOSIS — Z00.00 MEDICARE ANNUAL WELLNESS VISIT, SUBSEQUENT: Primary | ICD-10-CM

## 2024-11-25 DIAGNOSIS — G62.9 PERIPHERAL POLYNEUROPATHY: ICD-10-CM

## 2024-11-25 DIAGNOSIS — K21.9 GASTROESOPHAGEAL REFLUX DISEASE WITHOUT ESOPHAGITIS: Chronic | ICD-10-CM

## 2024-11-25 DIAGNOSIS — G89.29 CHRONIC BILATERAL LOW BACK PAIN WITHOUT SCIATICA: ICD-10-CM

## 2024-11-25 PROCEDURE — 99214 OFFICE O/P EST MOD 30 MIN: CPT | Performed by: NURSE PRACTITIONER

## 2024-11-25 PROCEDURE — 99397 PER PM REEVAL EST PAT 65+ YR: CPT | Performed by: NURSE PRACTITIONER

## 2024-11-25 PROCEDURE — 1125F AMNT PAIN NOTED PAIN PRSNT: CPT | Performed by: NURSE PRACTITIONER

## 2024-11-25 PROCEDURE — 1160F RVW MEDS BY RX/DR IN RCRD: CPT | Performed by: NURSE PRACTITIONER

## 2024-11-25 PROCEDURE — G0439 PPPS, SUBSEQ VISIT: HCPCS | Performed by: NURSE PRACTITIONER

## 2024-11-25 PROCEDURE — 1159F MED LIST DOCD IN RCRD: CPT | Performed by: NURSE PRACTITIONER

## 2024-11-25 RX ORDER — PREGABALIN 100 MG/1
100 CAPSULE ORAL 2 TIMES DAILY
Qty: 60 CAPSULE | Refills: 2 | Status: SHIPPED | OUTPATIENT
Start: 2024-11-25

## 2024-11-25 RX ORDER — THYROID 60 MG/1
60 TABLET ORAL DAILY
Qty: 90 TABLET | Refills: 1 | Status: SHIPPED | OUTPATIENT
Start: 2024-11-25

## 2024-11-25 NOTE — ASSESSMENT & PLAN NOTE
-- continue replacement   Orders:    Thyroid 60 MG PO tablet; Take 1 tablet by mouth Daily. Indications: Underactive Thyroid

## 2024-11-25 NOTE — PROGRESS NOTES
Subjective   The ABCs of the Annual Wellness Visit  Medicare Wellness Visit      Harley Rodriges is a 74 y.o. patient who presents for a Medicare Wellness Visit.    The following portions of the patient's history were reviewed and   updated as appropriate: allergies, current medications, past family history, past medical history, past social history, past surgical history, and problem list.    Compared to one year ago, the patient's physical   health is the same.  Compared to one year ago, the patient's mental   health is worse.    Recent Hospitalizations:  She was not admitted to the hospital during the last year.     Current Medical Providers:  Patient Care Team:  Amy Baeza APRN as PCP - General (Nurse Practitioner)    Outpatient Medications Prior to Visit   Medication Sig Dispense Refill    acetaminophen (TYLENOL) 500 MG tablet Take 1 tablet by mouth Every 6 (Six) Hours As Needed for Mild Pain. Indications: Pain      apixaban (ELIQUIS) 5 MG tablet tablet Take 1 tablet by mouth Every 12 (Twelve) Hours. Indications: Atrial Fibrillation 60 tablet 0    celecoxib (CeleBREX) 200 MG capsule       dicyclomine (BENTYL) 20 MG tablet Take 1 tablet by mouth 3 (Three) Times a Day As Needed (lower abdominal pain and diarrhea). (Patient taking differently: Take 1 tablet by mouth As Needed (lower abdominal pain and diarrhea).) 30 tablet 1    estradiol (ESTRACE) 0.5 MG tablet Take 1 tablet by mouth Daily. Indications: Hair Loss      loratadine (CLARITIN) 10 MG tablet Take 1 tablet by mouth Daily. Indications: Hayfever      losartan (COZAAR) 50 MG tablet Take 1 tablet by mouth Daily. Indications: High Blood Pressure      meclizine (ANTIVERT) 25 MG tablet Take 1 tablet by mouth 3 (Three) Times a Day As Needed for Dizziness.      metFORMIN (GLUCOPHAGE) 1000 MG tablet Take 0.5 tablets by mouth 2 (Two) Times a Day With Meals. 500 mg in am  Indications: Type 2 Diabetes      metoprolol succinate XL (TOPROL-XL) 25 MG 24 hr  tablet       Multiple Vitamins-Minerals (HAIR SKIN NAILS PO) Take 3 tablets by mouth Daily. Indications: Supplement      Multiple Vitamins-Minerals (ONE-A-DAY WOMENS 50+ PO) Take  by mouth. Indications: Supplement      Niacin, Antihyperlipidemic, 500 MG tablet Take 1 tablet by mouth Daily. Indications: supplement      omeprazole (priLOSEC) 40 MG capsule TAKE 1 CAPSULE BY MOUTH ONCE EVERY MORNING 30 MINUTES PRIOR TO BREAKFAST 30 capsule 1    rosuvastatin (CRESTOR) 10 MG tablet       traZODone (DESYREL) 100 MG tablet Take 0.5 tablets by mouth every night at bedtime. Indications: Anxiety Disorder, Trouble Sleeping      vitamin D3 125 MCG (5000 UT) capsule capsule Take 1 capsule by mouth Daily. Indications: Supplement      DULoxetine (CYMBALTA) 60 MG capsule Take 1 capsule by mouth 2 (Two) Times a Day. Indications: Peripheral Nerve Disease      pregabalin (Lyrica) 50 MG capsule Take 1 capsule by mouth 2 (Two) Times a Day. 60 capsule 1    Thyroid 60 MG PO tablet Take 1 tablet by mouth Daily. Indications: Underactive Thyroid       No facility-administered medications prior to visit.     No opioid medication identified on active medication list. I have reviewed chart for other potential  high risk medication/s and harmful drug interactions in the elderly.      Aspirin is not on active medication list.  Aspirin use is not indicated based on review of current medical condition/s. Risk of harm outweighs potential benefits.  .    Patient Active Problem List   Diagnosis    Diabetic peripheral neuropathy associated with type 2 diabetes mellitus    Amnesia    Obstructive apnea    Diarrhea    Lower abdominal pain    Weight loss    Personal history of colonic polyps    Gastroesophageal reflux disease    Irritable bowel syndrome with diarrhea    Anemia    Fatty (change of) liver, not elsewhere classified    Class 1 obesity due to excess calories with serious comorbidity and body mass index (BMI) of 33.0 to 33.9 in adult    MGUS  "(monoclonal gammopathy of unknown significance)    Focal neurological deficit    Transient paralysis of limb    Facial droop    Hypothyroidism    Fibromyalgia    Chronic bilateral low back pain without sciatica    Peripheral polyneuropathy     Advance Care Planning Advance Directive is not on file.  ACP discussion was held with the patient during this visit. Patient has an advance directive (not in EMR), copy requested.            Objective   Vitals:    24 1405   BP: 128/74   BP Location: Left arm   Patient Position: Sitting   Cuff Size: Large Adult   Pulse: 91   Resp: 14   Temp: 97.7 °F (36.5 °C)   TempSrc: Infrared   SpO2: 97%   Weight: 99.3 kg (219 lb)   Height: 162.6 cm (64.02\")   PainSc:   6   PainLoc: Back       Estimated body mass index is 37.57 kg/m² as calculated from the following:    Height as of this encounter: 162.6 cm (64.02\").    Weight as of this encounter: 99.3 kg (219 lb).            Does the patient have evidence of cognitive impairment? No  Lab Results   Component Value Date    HGBA1C 7.6 (A) 10/09/2024                                                                                                Health  Risk Assessment    Smoking Status:  Social History     Tobacco Use   Smoking Status Never    Passive exposure: Never   Smokeless Tobacco Never     Alcohol Consumption:  Social History     Substance and Sexual Activity   Alcohol Use Never       Fall Risk Screen  STEADI Fall Risk Assessment was completed, and patient is at MODERATE risk for falls. Assessment completed on:2024    Depression Screening   Little interest or pleasure in doing things? Not at all   Feeling down, depressed, or hopeless? Not at all   PHQ-2 Total Score 0      Health Habits and Functional and Cognitive Screenin/25/2024     2:08 PM   Functional & Cognitive Status   Do you have difficulty preparing food and eating? No   Do you have difficulty bathing yourself, getting dressed or grooming yourself? No   Do " you have difficulty using the toilet? No   Do you have difficulty moving around from place to place? No   Do you have trouble with steps or getting out of a bed or a chair? Yes   Current Diet Unhealthy Diet   Dental Exam Not up to date   Eye Exam Not up to date   Exercise (times per week) 3 times per week   Current Exercises Include Walking   Do you need help using the phone?  No   Are you deaf or do you have serious difficulty hearing?  No   Do you need help to go to places out of walking distance? No   Do you need help shopping? No   Do you need help preparing meals?  No   Do you need help with housework?  No   Do you need help with laundry? No   Do you need help taking your medications? No   Do you need help managing money? No   Do you ever drive or ride in a car without wearing a seat belt? No   Have you felt unusual stress, anger or loneliness in the last month? Yes   Who do you live with? Other   If you need help, do you have trouble finding someone available to you? No   Have you been bothered in the last four weeks by sexual problems? No   Do you have difficulty concentrating, remembering or making decisions? Yes           Age-appropriate Screening Schedule:  Refer to the list below for future screening recommendations based on patient's age, sex and/or medical conditions. Orders for these recommended tests are listed in the plan section. The patient has been provided with a written plan.    Health Maintenance List  Health Maintenance   Topic Date Due    DXA SCAN  Never done    DIABETIC FOOT EXAM  Never done    DIABETIC EYE EXAM  Never done    TDAP/TD VACCINES (1 - Tdap) Never done    ZOSTER VACCINE (1 of 2) Never done    RSV Vaccine - Adults (1 - 1-dose 75+ series) Never done    COVID-19 Vaccine (4 - 2024-25 season) 09/01/2024    LIPID PANEL  11/05/2024    INFLUENZA VACCINE  03/31/2025 (Originally 7/1/2024)    Pneumococcal Vaccine 65+ (1 of 2 - PCV) 11/25/2025 (Originally 6/24/1956)    HEMOGLOBIN A1C   "04/09/2025    BMI FOLLOWUP  10/09/2025    ANNUAL WELLNESS VISIT  11/25/2025    HEPATITIS C SCREENING  Completed    MAMMOGRAM  Discontinued    COLORECTAL CANCER SCREENING  Discontinued                                                                                                                                                CMS Preventative Services Quick Reference  Risk Factors Identified During Encounter  None Identified    The above risks/problems have been discussed with the patient.  Pertinent information has been shared with the patient in the After Visit Summary.  An After Visit Summary and PPPS were made available to the patient.    Follow Up:   Next Medicare Wellness visit to be scheduled in 1 year.         Additional E&M Note during same encounter follows:  Patient has additional, significant, and separately identifiable condition(s)/problem(s) that require work above and beyond the Medicare Wellness Visit     Chief Complaint  Medicare Wellness-subsequent    Subjective   HPI  Harley is also being seen today for an annual adult preventative physical exam.  and Harley is also being seen today for additional medical problem/s.  Afib/htn on eliquis, is having trouble affording, follows with Dr Waldrop, statin  Has been on liptior and crestor   Hypothyroidism, on NP thyroid  Diabetes, on metformin, would like to get off if she could and take jardiance but is on donut hole   Back pain/fibro/neuropathy, on lyrica, has not noticed much difference  Had a fall last week   RA does not take anything for it other than tylenol  Insomnia on trazodone   Some situational stress but is journaling, does not want any medication    haydee SALGUERO, has not seen in awhile                       Objective   Vital Signs:  /74 (BP Location: Left arm, Patient Position: Sitting, Cuff Size: Large Adult)   Pulse 91   Temp 97.7 °F (36.5 °C) (Infrared)   Resp 14   Ht 162.6 cm (64.02\")   Wt 99.3 kg (219 lb)   SpO2 97%   BMI " 37.57 kg/m²   Physical Exam  Vitals and nursing note reviewed.   Constitutional:       Appearance: Normal appearance. She is well-developed.   HENT:      Head: Normocephalic and atraumatic.      Right Ear: Tympanic membrane, ear canal and external ear normal.      Left Ear: Tympanic membrane, ear canal and external ear normal.      Nose: Nose normal.      Mouth/Throat:      Pharynx: Uvula midline.   Eyes:      Pupils: Pupils are equal, round, and reactive to light.   Cardiovascular:      Rate and Rhythm: Normal rate and regular rhythm.      Heart sounds: Normal heart sounds. No murmur heard.     No friction rub. No gallop.   Pulmonary:      Effort: Pulmonary effort is normal.      Breath sounds: Normal breath sounds.   Abdominal:      General: Bowel sounds are normal. There is no distension.      Palpations: Abdomen is soft.      Tenderness: There is no abdominal tenderness.   Musculoskeletal:      Cervical back: Neck supple.   Lymphadenopathy:      Head:      Right side of head: No submental, submandibular, tonsillar, preauricular or posterior auricular adenopathy.      Left side of head: No submental, submandibular, tonsillar, preauricular or posterior auricular adenopathy.      Cervical: No cervical adenopathy.   Skin:     General: Skin is warm and dry.   Neurological:      General: No focal deficit present.      Mental Status: She is alert and oriented to person, place, and time.   Psychiatric:         Mood and Affect: Mood normal.         Behavior: Behavior normal.                       Assessment and Plan Additional age appropriate preventative wellness advice topics were discussed during today's preventative wellness exam(some topics already addressed during AWV portion of the note above):    Physical Activity: Advised cardiovascular activity 150 minutes per week as tolerated. (example brisk walk for 30 minutes, 5 days a week).           Fibromyalgia    Orders:    pregabalin (Lyrica) 100 MG capsule; Take 1  capsule by mouth 2 (Two) Times a Day.    Chronic bilateral low back pain without sciatica    Orders:    pregabalin (Lyrica) 100 MG capsule; Take 1 capsule by mouth 2 (Two) Times a Day.    Peripheral polyneuropathy    Orders:    pregabalin (Lyrica) 100 MG capsule; Take 1 capsule by mouth 2 (Two) Times a Day.    Hypothyroidism, unspecified type  -- continue replacement   Orders:    Thyroid 60 MG PO tablet; Take 1 tablet by mouth Daily. Indications: Underactive Thyroid    Medicare annual wellness visit, subsequent  -- wellness visit performed, avoid prolonged fasting periods, ensure good hydration, stay active, yearly eye exam'       Irritable bowel syndrome with diarrhea  -- fodmap diet       Gastroesophageal reflux disease without esophagitis  -- controlled on ppi       MGUS (monoclonal gammopathy of unknown significance)  -- continue f/u with hem/onc       Preventative health care                 Follow Up   3 months  Patient was given instructions and counseling regarding her condition or for health maintenance advice. Please see specific information pulled into the AVS if appropriate.

## 2024-12-03 ENCOUNTER — TELEPHONE (OUTPATIENT)
Dept: ONCOLOGY | Facility: CLINIC | Age: 74
End: 2024-12-03
Payer: MEDICARE

## 2024-12-03 NOTE — TELEPHONE ENCOUNTER
Caller: Harley Rodriges    Relationship: Self    Best call back number: [phone]    What was the call regarding: PATIENT HAS BEEN HAVING ISSUES, TENDER AND LUMPING ON BOTH BREAST, NEAR HER SHOULDER AND NECK. LAST MAMMO WAS ON 5/2024.  LAST SEEN DR. KRISHNA ON 5/18/2023.    TOLD BY PCP TO F/U WITH DR. KRISHNA.    CALL TO ADVISE

## 2024-12-03 NOTE — TELEPHONE ENCOUNTER
Return call to Harley.  Appointment set for 12/26 at 830 am to see Dr Pacheco.  Harley states understood

## 2024-12-09 RX ORDER — DULOXETIN HYDROCHLORIDE 60 MG/1
60 CAPSULE, DELAYED RELEASE ORAL 2 TIMES DAILY
Qty: 180 CAPSULE | Refills: 1 | Status: SHIPPED | OUTPATIENT
Start: 2024-12-09

## 2024-12-09 NOTE — TELEPHONE ENCOUNTER
Rx Refill Note  Requested Prescriptions     Pending Prescriptions Disp Refills    DULoxetine (CYMBALTA) 60 MG capsule       Sig: Take 1 capsule by mouth 2 (Two) Times a Day. Indications: Peripheral Nerve Disease      Last office visit with prescribing clinician: 11/25/2024   Last telemedicine visit with prescribing clinician: Visit date not found   Next office visit with prescribing clinician: 2/25/2025     Duyen Avina MA  12/09/24, 14:59 EST

## 2024-12-09 NOTE — TELEPHONE ENCOUNTER
Caller: Harley Rodriges    Relationship: Self    Best call back number: [phone]     Requested Prescriptions:   Requested Prescriptions     Pending Prescriptions Disp Refills    DULoxetine (CYMBALTA) 60 MG capsule       Sig: Take 1 capsule by mouth 2 (Two) Times a Day. Indications: Peripheral Nerve Disease        Pharmacy where request should be sent: [id number] - [place], [place] - [address] - [phone] [facility] [phone]      Last office visit with prescribing clinician: 11/25/2024   Last telemedicine visit with prescribing clinician: Visit date not found   Next office visit with prescribing clinician: 2/25/2025     Additional details provided by patient: PATIENT IS COMPLETELY OUT    Does the patient have less than a 3 day supply:  [x] Yes  [] No    Would you like a call back once the refill request has been completed: [] Yes [x] No    If the office needs to give you a call back, can they leave a voicemail: [] Yes [x] No    Luther Whittaker Rep   12/09/24 09:51 EST

## 2024-12-10 ENCOUNTER — HOSPITAL ENCOUNTER (OUTPATIENT)
Dept: SLEEP MEDICINE | Facility: HOSPITAL | Age: 74
Discharge: HOME | End: 2024-12-10
Admitting: NURSE PRACTITIONER
Payer: MEDICARE

## 2024-12-10 DIAGNOSIS — G47.33 OBSTRUCTIVE SLEEP APNEA: ICD-10-CM

## 2024-12-10 PROCEDURE — G0399 HOME SLEEP TEST/TYPE 3 PORTA: HCPCS

## 2024-12-18 DIAGNOSIS — G47.33 OSA (OBSTRUCTIVE SLEEP APNEA): Primary | ICD-10-CM

## 2024-12-26 ENCOUNTER — OFFICE VISIT (OUTPATIENT)
Dept: ONCOLOGY | Facility: CLINIC | Age: 74
End: 2024-12-26
Payer: MEDICARE

## 2024-12-26 ENCOUNTER — LAB (OUTPATIENT)
Dept: LAB | Facility: HOSPITAL | Age: 74
End: 2024-12-26
Payer: MEDICARE

## 2024-12-26 VITALS
HEIGHT: 64 IN | RESPIRATION RATE: 16 BRPM | WEIGHT: 219.3 LBS | BODY MASS INDEX: 37.44 KG/M2 | HEART RATE: 63 BPM | TEMPERATURE: 97.3 F | OXYGEN SATURATION: 94 % | SYSTOLIC BLOOD PRESSURE: 168 MMHG | DIASTOLIC BLOOD PRESSURE: 86 MMHG

## 2024-12-26 DIAGNOSIS — D47.2 MGUS (MONOCLONAL GAMMOPATHY OF UNKNOWN SIGNIFICANCE): ICD-10-CM

## 2024-12-26 DIAGNOSIS — D47.2 MGUS (MONOCLONAL GAMMOPATHY OF UNKNOWN SIGNIFICANCE): Primary | ICD-10-CM

## 2024-12-26 LAB
ALBUMIN SERPL-MCNC: 4.1 G/DL (ref 3.5–5.2)
ALBUMIN/GLOB SERPL: 1.6 G/DL
ALP SERPL-CCNC: 62 U/L (ref 39–117)
ALT SERPL W P-5'-P-CCNC: 14 U/L (ref 1–33)
ANION GAP SERPL CALCULATED.3IONS-SCNC: 12.2 MMOL/L (ref 5–15)
AST SERPL-CCNC: 21 U/L (ref 1–32)
B2 MICROGLOB SERPL-MCNC: 3.1 MG/L (ref 0.8–2.2)
BASOPHILS # BLD AUTO: 0.03 10*3/MM3 (ref 0–0.2)
BASOPHILS NFR BLD AUTO: 0.7 % (ref 0–1.5)
BILIRUB SERPL-MCNC: 0.2 MG/DL (ref 0–1.2)
BUN SERPL-MCNC: 17 MG/DL (ref 8–23)
BUN/CREAT SERPL: 18.3 (ref 7–25)
CALCIUM SPEC-SCNC: 9.9 MG/DL (ref 8.6–10.5)
CHLORIDE SERPL-SCNC: 99 MMOL/L (ref 98–107)
CO2 SERPL-SCNC: 24.8 MMOL/L (ref 22–29)
CREAT SERPL-MCNC: 0.93 MG/DL (ref 0.57–1)
DEPRECATED RDW RBC AUTO: 43 FL (ref 37–54)
EGFRCR SERPLBLD CKD-EPI 2021: 63.8 ML/MIN/1.73
EOSINOPHIL # BLD AUTO: 0.18 10*3/MM3 (ref 0–0.4)
EOSINOPHIL NFR BLD AUTO: 4.1 % (ref 0.3–6.2)
ERYTHROCYTE [DISTWIDTH] IN BLOOD BY AUTOMATED COUNT: 12.9 % (ref 12.3–15.4)
GLOBULIN UR ELPH-MCNC: 2.5 GM/DL
GLUCOSE SERPL-MCNC: 189 MG/DL (ref 65–99)
HCT VFR BLD AUTO: 33.6 % (ref 34–46.6)
HGB BLD-MCNC: 11 G/DL (ref 12–15.9)
IMM GRANULOCYTES # BLD AUTO: 0.03 10*3/MM3 (ref 0–0.05)
IMM GRANULOCYTES NFR BLD AUTO: 0.7 % (ref 0–0.5)
LYMPHOCYTES # BLD AUTO: 2.38 10*3/MM3 (ref 0.7–3.1)
LYMPHOCYTES NFR BLD AUTO: 54 % (ref 19.6–45.3)
MCH RBC QN AUTO: 30.1 PG (ref 26.6–33)
MCHC RBC AUTO-ENTMCNC: 32.7 G/DL (ref 31.5–35.7)
MCV RBC AUTO: 91.8 FL (ref 79–97)
MONOCYTES # BLD AUTO: 0.31 10*3/MM3 (ref 0.1–0.9)
MONOCYTES NFR BLD AUTO: 7 % (ref 5–12)
NEUTROPHILS NFR BLD AUTO: 1.48 10*3/MM3 (ref 1.7–7)
NEUTROPHILS NFR BLD AUTO: 33.5 % (ref 42.7–76)
NRBC BLD AUTO-RTO: 0 /100 WBC (ref 0–0.2)
PLATELET # BLD AUTO: 170 10*3/MM3 (ref 140–450)
PMV BLD AUTO: 9.4 FL (ref 6–12)
POTASSIUM SERPL-SCNC: 4.7 MMOL/L (ref 3.5–5.2)
PROT SERPL-MCNC: 6.6 G/DL (ref 6–8.5)
RBC # BLD AUTO: 3.66 10*6/MM3 (ref 3.77–5.28)
SODIUM SERPL-SCNC: 136 MMOL/L (ref 136–145)
WBC NRBC COR # BLD AUTO: 4.41 10*3/MM3 (ref 3.4–10.8)

## 2024-12-26 PROCEDURE — 85025 COMPLETE CBC W/AUTO DIFF WBC: CPT

## 2024-12-26 PROCEDURE — 1125F AMNT PAIN NOTED PAIN PRSNT: CPT | Performed by: INTERNAL MEDICINE

## 2024-12-26 PROCEDURE — 82784 ASSAY IGA/IGD/IGG/IGM EACH: CPT

## 2024-12-26 PROCEDURE — 82232 ASSAY OF BETA-2 PROTEIN: CPT

## 2024-12-26 PROCEDURE — 36415 COLL VENOUS BLD VENIPUNCTURE: CPT

## 2024-12-26 PROCEDURE — 84165 PROTEIN E-PHORESIS SERUM: CPT

## 2024-12-26 PROCEDURE — 86334 IMMUNOFIX E-PHORESIS SERUM: CPT

## 2024-12-26 PROCEDURE — 80053 COMPREHEN METABOLIC PANEL: CPT

## 2024-12-26 PROCEDURE — 83521 IG LIGHT CHAINS FREE EACH: CPT

## 2024-12-26 PROCEDURE — 99214 OFFICE O/P EST MOD 30 MIN: CPT | Performed by: INTERNAL MEDICINE

## 2024-12-26 RX ORDER — FERROUS SULFATE 324(65)MG
TABLET, DELAYED RELEASE (ENTERIC COATED) ORAL
COMMUNITY

## 2024-12-26 NOTE — PROGRESS NOTES
Follow Up Office Visit      Date: 2024     Patient Name: Harley Rodriges  MRN: [id number]  : 1950  Referring Physician: Bam German     Chief Complaint:  Follow-up for MGUS     History of Present Illness: Harley Rodriges is a pleasant 72 y.o. female with a past medical history of fibromyalgia, Lyme disease, iron deficiency, type 2 diabetes, hypertension, hypothyroidism who presents today for evaluation of anemia. The patient is accompanied by their friend who contributes to the history of their care.  Patient has been followed by the PCP as well as GI where she had labs checked last month which were notable for mild anemia with a hemoglobin of 11.3.  Iron studies were within normal limits and vitamin B12 was slightly elevated.  Per patient, she takes ferrous sulfate 3 times daily.  Denies any abdominal pain or discomfort.  Does note some dark stools related to the medication.  She has noted over the past 3 months that she is having significantly worsening fatigue, tiredness, and dizzy spells which appear worse when getting up to ambulate.  She has had to cut back on work and has had diminished quality of life as a result of this.     Interval History:  Presents to clinic for follow-up.  Lost to follow-up since May 2023.  No new issues today.  Stable bone pain and discomfort    Oncology History:    Oncology/Hematology History    No history exists.       Subjective      Review of Systems:   Constitutional: Negative for fevers, chills, or weight loss  Eyes: Negative for blurred vision or discharge         Ear/Nose/Throat: Negative for difficulty swallowing, sore throat, LAD                                                       Respiratory: Negative for cough, SOA, wheezing                                                                                        Cardiovascular: Negative for chest pain or palpitations                                                                  Gastrointestinal:  Negative for nausea, vomiting or diarrhea                                                                     Genitourinary: Negative for dysuria or hematuria                                                                                           Musculoskeletal: Negative for any joint pains or muscle aches                                                                        Neurologic: Negative for any weakness, headaches, dizziness                                                                         Hematologic: Negative for any easy bleeding or bruising                                                                                   Psychiatric: Negative for anxiety or depression                          Past Medical History/Past Surgical History/ Family History/ Social History: Reviewed by me and unchanged from my previous documentation done on May 2023.     Medications:     Current Outpatient Medications:     acetaminophen (TYLENOL) 500 MG tablet, Take 1 tablet by mouth Every 6 (Six) Hours As Needed for Mild Pain. Indications: Pain, Disp: , Rfl:     apixaban (ELIQUIS) 5 MG tablet tablet, Take 1 tablet by mouth Every 12 (Twelve) Hours. Indications: Atrial Fibrillation, Disp: 60 tablet, Rfl: 0    celecoxib (CeleBREX) 200 MG capsule, , Disp: , Rfl:     dicyclomine (BENTYL) 20 MG tablet, Take 1 tablet by mouth 3 (Three) Times a Day As Needed (lower abdominal pain and diarrhea). (Patient taking differently: Take 1 tablet by mouth As Needed (lower abdominal pain and diarrhea).), Disp: 30 tablet, Rfl: 1    DULoxetine (CYMBALTA) 60 MG capsule, Take 1 capsule by mouth 2 (Two) Times a Day. Indications: Peripheral Nerve Disease, Disp: 180 capsule, Rfl: 1    estradiol (ESTRACE) 0.5 MG tablet, Take 1 tablet by mouth Daily. Indications: Hair Loss, Disp: , Rfl:     ferrous sulfate 324 (65 Fe) MG tablet delayed-release EC tablet, Take 2 tabs by mouth every morning and 1 tab at bedtime., Disp: , Rfl:     loratadine  "(CLARITIN) 10 MG tablet, Take 1 tablet by mouth Daily. Indications: Hayfever, Disp: , Rfl:     losartan (COZAAR) 50 MG tablet, Take 1 tablet by mouth Daily. Indications: High Blood Pressure, Disp: , Rfl:     meclizine (ANTIVERT) 25 MG tablet, Take 1 tablet by mouth 3 (Three) Times a Day As Needed for Dizziness., Disp: , Rfl:     metFORMIN (GLUCOPHAGE) 1000 MG tablet, Take 0.5 tablets by mouth 2 (Two) Times a Day With Meals. 500 mg in am  Indications: Type 2 Diabetes, Disp: , Rfl:     metoprolol succinate XL (TOPROL-XL) 25 MG 24 hr tablet, , Disp: , Rfl:     Multiple Vitamins-Minerals (HAIR SKIN NAILS PO), Take 3 tablets by mouth Daily. Indications: Supplement, Disp: , Rfl:     Multiple Vitamins-Minerals (ONE-A-DAY WOMENS 50+ PO), Take  by mouth. Indications: Supplement, Disp: , Rfl:     Niacin, Antihyperlipidemic, 500 MG tablet, Take 1 tablet by mouth Daily. Indications: supplement, Disp: , Rfl:     omeprazole (priLOSEC) 40 MG capsule, TAKE 1 CAPSULE BY MOUTH ONCE EVERY MORNING 30 MINUTES PRIOR TO BREAKFAST, Disp: 30 capsule, Rfl: 1    pregabalin (Lyrica) 100 MG capsule, Take 1 capsule by mouth 2 (Two) Times a Day., Disp: 60 capsule, Rfl: 2    rosuvastatin (CRESTOR) 10 MG tablet, , Disp: , Rfl:     Thyroid 60 MG PO tablet, Take 1 tablet by mouth Daily. Indications: Underactive Thyroid, Disp: 90 tablet, Rfl: 1    traZODone (DESYREL) 100 MG tablet, Take 0.5 tablets by mouth every night at bedtime. Indications: Anxiety Disorder, Trouble Sleeping, Disp: , Rfl:     vitamin D3 125 MCG (5000 UT) capsule capsule, Take 1 capsule by mouth Daily. Indications: Supplement, Disp: , Rfl:     Allergies:   Allergies   Allergen Reactions    Prednisone Irritability     Can take, but makes me sleepy, irritable, foggy in my head       Objective     Physical Exam:  Vital Signs:   Vitals:    12/26/24 0830   BP: 168/86   Pulse: 63   Resp: 16   Temp: 97.3 °F (36.3 °C)   SpO2: 94%   Weight: 99.5 kg (219 lb 4.8 oz)   Height: 162.6 cm (64.02\") "   PainSc:   6     Pain Score    12/26/24 0830   PainSc:   6     ECOG Performance Status: 1 - Symptomatic but completely ambulatory    Constitutional: NAD, ECOG 1  Eyes: PERRLA, scleral anicteric  ENT: No LAD, no thyromegaly  Respiratory: CTAB, no wheezing, rales, rhonchi  Cardiovascular: RRR, no murmurs, pulses 2+ bilaterally  Abdomen: soft, NT/ND, no HSM  Musculoskeletal: strength 5/5 bilaterally, no c/c/e  Neurologic: A&O x 3, CN II-XII intact grossly    Results Review:   No visits with results within 2 Week(s) from this visit.   Latest known visit with results is:   Office Visit on 10/09/2024   Component Date Value Ref Range Status    Hemoglobin A1C 10/09/2024 7.6 (A)  4.5 - 5.7 % Final    Lot Number 10/09/2024 [id number]   Final    Expiration Date 10/09/2024 05/07/2026   Final    TSH 10/09/2024 0.250 (L)  0.270 - 4.200 uIU/mL Final       Home Sleep Study    Result Date: 12/16/2024  Narrative: Table formatting from the original result was not included. [facility], Critical Care, [facility] Sleep Medicine Mayte Christie M.D. [address] Medical Office Building [address] [address]. Phone: [phone] Fax: [phone] HOME SLEEP STUDY INTERPRETATION Date of Study: 12/10/2024 Patient Name: Harley Rodriges YOB: 1950 MRN:  [id number] Primary Care Physician: Amy Baeza APRN Study ordered by: Julissa Keith* BMI Readings from Last 1 Encounters: 11/25/24 37.57 kg/m² Diagnosis after study: Obstructive Sleep Apnea. Snoring Mild event related hypoxia Impression: Study was performed according to standardized protocol and appears to meet the technical quality criteria as per AASM. Hypopneas were scored according to AASM definition 1B (4% desaturation). Home sleep study underestimates the RDI/AHI, as the Total Sleep Time can't be accurately assessed. Total estimated recording time was 636 minutes. This study reveals severe sleep apnea with an AHI  of 37 /hour. AHI appeared to be worse in supine vs non-supine sleep (46 vs 36 /hour) Event related hypoxia noted. Recommendations: If the patient's BMI is >30, weight loss maybe beneficial and is recommended. The patient may need to be considered for a trial of Auto PAP, possibly at empiric pressure limits of 6 - 20 cm. The patient may need a formal titration study afterwards. Patient may benefit from overnight pulse oximetry on AutoPap and if it continues to show hypoxemia despite the use of AutoPap, then she may need to be considered for a full night titration study. Follow up: Patient will need to follow up in 4-24 weeks with one of the providers in this office. Clinical Information: Please review progress notes from the ordering provider for details on clinical information. Please feel free to contact the office of [facility] Pulmonary, Critical Care and Sleep Medicine at [phone], if you have any questions about this study. Best regards, This document was electronically signed by Mayte Christie MD on 12/16/24 at 15:32 EST      Assessment / Plan      Assessment/Plan:   1. MGUS (monoclonal gammopathy of unknown significance) (Primary)  -Work-up for anemia notable for an M spike of 0.7 with an IgG kappa light chain component in May 2023  -Kappa/lambda light chain ratio within normal limits, beta-2 microglobulin mildly elevated in May 2023  -Discussed the diagnosis which represents a 1% chance additive per year increase of developing into multiple myeloma  -Plan to recheck myeloma studies today  -Will consider bone marrow biopsy and PET/CT based off results         Follow Up:   Follow-up in 6 months     Elmer Pacheco MD  Hematology and Oncology     Please note that portions of this note may have been completed with a voice recognition program. Efforts were made to edit the dictations, but occasionally words are mistranscribed.

## 2024-12-27 LAB
KAPPA LC FREE SER-MCNC: 49.8 MG/L (ref 3.3–19.4)
KAPPA LC FREE/LAMBDA FREE SER: 2.49 {RATIO} (ref 0.26–1.65)
LAMBDA LC FREE SERPL-MCNC: 20 MG/L (ref 5.7–26.3)

## 2024-12-30 LAB
ALBUMIN SERPL ELPH-MCNC: 3.6 G/DL (ref 2.9–4.4)
ALBUMIN/GLOB SERPL: 1.3 {RATIO} (ref 0.7–1.7)
ALPHA1 GLOB SERPL ELPH-MCNC: 0.2 G/DL (ref 0–0.4)
ALPHA2 GLOB SERPL ELPH-MCNC: 0.9 G/DL (ref 0.4–1)
B-GLOBULIN SERPL ELPH-MCNC: 0.8 G/DL (ref 0.7–1.3)
GAMMA GLOB SERPL ELPH-MCNC: 1.1 G/DL (ref 0.4–1.8)
GLOBULIN SER-MCNC: 2.9 G/DL (ref 2.2–3.9)
IGA SERPL-MCNC: 39 MG/DL (ref 64–422)
IGG SERPL-MCNC: 1273 MG/DL (ref 586–1602)
IGM SERPL-MCNC: 66 MG/DL (ref 26–217)
INTERPRETATION SERPL IEP-IMP: ABNORMAL
LABORATORY COMMENT REPORT: ABNORMAL
M PROTEIN SERPL ELPH-MCNC: 0.7 G/DL
PROT SERPL-MCNC: 6.5 G/DL (ref 6–8.5)

## 2025-01-02 ENCOUNTER — OFFICE VISIT (OUTPATIENT)
Dept: NEUROLOGY | Facility: CLINIC | Age: 75
End: 2025-01-02
Payer: MEDICARE

## 2025-01-02 VITALS
DIASTOLIC BLOOD PRESSURE: 72 MMHG | OXYGEN SATURATION: 99 % | SYSTOLIC BLOOD PRESSURE: 118 MMHG | BODY MASS INDEX: 37.22 KG/M2 | HEART RATE: 65 BPM | HEIGHT: 64 IN | TEMPERATURE: 98.7 F | WEIGHT: 218 LBS

## 2025-01-02 DIAGNOSIS — G62.9 PERIPHERAL POLYNEUROPATHY: ICD-10-CM

## 2025-01-02 DIAGNOSIS — R41.89 SUBJECTIVE MEMORY COMPLAINTS: Primary | ICD-10-CM

## 2025-01-02 DIAGNOSIS — M54.50 CHRONIC BILATERAL LOW BACK PAIN WITHOUT SCIATICA: ICD-10-CM

## 2025-01-02 DIAGNOSIS — G47.33 OSA (OBSTRUCTIVE SLEEP APNEA): ICD-10-CM

## 2025-01-02 DIAGNOSIS — G89.29 CHRONIC BILATERAL LOW BACK PAIN WITHOUT SCIATICA: ICD-10-CM

## 2025-01-02 DIAGNOSIS — M79.7 FIBROMYALGIA: ICD-10-CM

## 2025-01-02 DIAGNOSIS — R55 SYNCOPE, UNSPECIFIED SYNCOPE TYPE: ICD-10-CM

## 2025-01-02 NOTE — TELEPHONE ENCOUNTER
Caller: Harley Rodriges    Relationship: Self    Best call back number: [phone]     Requested Prescriptions:   Requested Prescriptions     Pending Prescriptions Disp Refills    celecoxib (CeleBREX) 200 MG capsule      pregabalin (Lyrica) 100 MG capsule 60 capsule 2     Sig: Take 1 capsule by mouth 2 (Two) Times a Day.    apixaban (ELIQUIS) 5 MG tablet tablet 60 tablet 0     Sig: Take 1 tablet by mouth Every 12 (Twelve) Hours. Indications: Atrial Fibrillation        Pharmacy where request should be sent: [id number] - [place], [place] - [address] - [phone] [facility] [phone] FX     Last office visit with prescribing clinician: 11/25/2024   Last telemedicine visit with prescribing clinician: Visit date not found   Next office visit with prescribing clinician: 2/25/2025     Additional details provided by patient: PATIENT IS COMPLETELY OUT OF 2 OF THESE MEDICATIONS      Does the patient have less than a 3 day supply:  [x] Yes  [] No    Would you like a call back once the refill request has been completed: [] Yes [x] No    If the office needs to give you a call back, can they leave a voicemail: [] Yes [x] No    Luther Reid Rep   01/02/25 14:56 EST

## 2025-01-02 NOTE — PROGRESS NOTES
Follow Up Office Visit      Patient Name: Harley Rodriges  : 1950   MRN: [id number]     Chief Complaint:    Chief Complaint   Patient presents with    Follow-up     Memory concerns; patient reports symptoms have not changed; also follow up for Syncope/falls       History of Present Illness: Harley Rodriges is a 74 y.o. female who is here today to follow up with neurology for syncope and subjective memory complaints. She was last seen in clinic  (Denita). She completed her neuropsychological testing, EEG and a home sleep study. She denies syncopal episodes. She did have a fall at [facility] when she was jumping with children and playing with a blanket. Denies LOC changes or seizure like activity. She continues to struggle with word finding and slowed processing speed. She does not feel things have worsened with her memory.  She completed a home sleep study and has not been set back up on AutoPap therapy. [facility]. This is managed by Pulmonary (Shahnaz/Franki). She feels the mask is leaking and is not a good fit- was fitted for mask with new [employer] phone sasha and she feels it was faulty. She does not want to have AD testing or take medications for her memory if she can avoid it.     Recent Imaging:     EEG 10/24 - normal  Home Sleep Study  + AHI 36.6 with lowest desaturation 80%  Neuropsychological Evaluation  + low average to high average visual perception, visual constructive ability, learning, memory, processing speed, auditory attention, verbal fluency, and word retrieval.  Her global cognitive ability was average.  Neurocognitive testing is not consistent with a neurodegenerative condition.  Polysomnography  + AHI 3.9/hr with lowest Sp02 80%   MRI Brain W/WO  + Small focus of susceptibility artifact in the right occipital lobe subdural space is compatible with punctate focus of calcification on CT. Small filling defects are visualized in the right transverse sinus, small  blood clots not excluded.  CTA Neck 11/23 - noncontributory   CTA Head 11/23 -noncontributory   CT Head WO CVA Protocol - noncontributory      Pertinent Medical History: Obesity, GERD, IBS-D, RAO, anemia, MGUS, DM PN, SUPA (untreated), Concussion 1965 from Fall, Lyme Diease, fibromyalgia    Subjective      Review of Systems:   Review of Systems   Neurological:  Positive for memory problem. Negative for seizures.   Psychiatric/Behavioral:  Positive for sleep disturbance.        I have reviewed and the following portions of the patient's history were updated as appropriate: past family history, past medical history, past social history, past surgical history and problem list.    Medications:     Current Outpatient Medications:     acetaminophen (TYLENOL) 500 MG tablet, Take 1 tablet by mouth Every 6 (Six) Hours As Needed for Mild Pain. Indications: Pain, Disp: , Rfl:     apixaban (ELIQUIS) 5 MG tablet tablet, Take 1 tablet by mouth Every 12 (Twelve) Hours. Indications: Atrial Fibrillation, Disp: 60 tablet, Rfl: 0    celecoxib (CeleBREX) 200 MG capsule, , Disp: , Rfl:     dicyclomine (BENTYL) 20 MG tablet, Take 1 tablet by mouth 3 (Three) Times a Day As Needed (lower abdominal pain and diarrhea). (Patient taking differently: Take 1 tablet by mouth As Needed (lower abdominal pain and diarrhea).), Disp: 30 tablet, Rfl: 1    DULoxetine (CYMBALTA) 60 MG capsule, Take 1 capsule by mouth 2 (Two) Times a Day. Indications: Peripheral Nerve Disease, Disp: 180 capsule, Rfl: 1    estradiol (ESTRACE) 0.5 MG tablet, Take 1 tablet by mouth Daily. Indications: Hair Loss, Disp: , Rfl:     ferrous sulfate 324 (65 Fe) MG tablet delayed-release EC tablet, Take 2 tabs by mouth every morning and 1 tab at bedtime., Disp: , Rfl:     loratadine (CLARITIN) 10 MG tablet, Take 1 tablet by mouth Daily. Indications: Hayfever, Disp: , Rfl:     losartan (COZAAR) 50 MG tablet, Take 1 tablet by mouth Daily. Indications: High Blood Pressure, Disp: ,  "Rfl:     meclizine (ANTIVERT) 25 MG tablet, Take 1 tablet by mouth 3 (Three) Times a Day As Needed for Dizziness., Disp: , Rfl:     metFORMIN (GLUCOPHAGE) 1000 MG tablet, Take 0.5 tablets by mouth 2 (Two) Times a Day With Meals. 500 mg in am  Indications: Type 2 Diabetes, Disp: , Rfl:     metoprolol succinate XL (TOPROL-XL) 25 MG 24 hr tablet, , Disp: , Rfl:     Multiple Vitamins-Minerals (HAIR SKIN NAILS PO), Take 3 tablets by mouth Daily. Indications: Supplement, Disp: , Rfl:     Multiple Vitamins-Minerals (ONE-A-DAY WOMENS 50+ PO), Take  by mouth. Indications: Supplement, Disp: , Rfl:     Niacin, Antihyperlipidemic, 500 MG tablet, Take 1 tablet by mouth Daily. Indications: supplement, Disp: , Rfl:     omeprazole (priLOSEC) 40 MG capsule, TAKE 1 CAPSULE BY MOUTH ONCE EVERY MORNING 30 MINUTES PRIOR TO BREAKFAST, Disp: 30 capsule, Rfl: 1    pregabalin (Lyrica) 100 MG capsule, Take 1 capsule by mouth 2 (Two) Times a Day., Disp: 60 capsule, Rfl: 2    rosuvastatin (CRESTOR) 10 MG tablet, , Disp: , Rfl:     Thyroid 60 MG PO tablet, Take 1 tablet by mouth Daily. Indications: Underactive Thyroid, Disp: 90 tablet, Rfl: 1    traZODone (DESYREL) 100 MG tablet, Take 0.5 tablets by mouth every night at bedtime. Indications: Anxiety Disorder, Trouble Sleeping, Disp: , Rfl:     Unable to find, 1 each 1 (One) Time. Berberine, Disp: , Rfl:     vitamin D3 125 MCG (5000 UT) capsule capsule, Take 1 capsule by mouth Daily. Indications: Supplement, Disp: , Rfl:     Allergies:   Allergies   Allergen Reactions    Prednisone Irritability     Can take, but makes me sleepy, irritable, foggy in my head       Objective     Physical Exam:  Vital Signs:   Vitals:    01/02/25 1032   BP: 118/72   Pulse: 65   Temp: 98.7 °F (37.1 °C)   SpO2: 99%   Weight: 98.9 kg (218 lb)   Height: 162.6 cm (64\")     Body mass index is 37.42 kg/m².    Physical Exam  Vitals and nursing note reviewed.   Constitutional:       General: She is awake. She is not in acute " distress.     Appearance: Normal appearance.   HENT:      Head: Normocephalic.      Nose: Nose normal.      Mouth/Throat:      Mouth: Mucous membranes are moist.      Pharynx: Oropharynx is clear.   Eyes:      Extraocular Movements: Extraocular movements intact.      Conjunctiva/sclera: Conjunctivae normal.   Musculoskeletal:      Cervical back: Normal range of motion and neck supple.      Comments: Ambulates with rollator    Skin:     General: Skin is warm and dry.      Capillary Refill: Capillary refill takes less than 2 seconds.   Neurological:      Mental Status: She is alert and oriented to person, place, and time.   Psychiatric:         Mood and Affect: Mood normal.         Speech: Speech normal.         Behavior: Behavior normal.         Neurological Exam  Mental Status  Awake and alert. Oriented to person, place, and time. Recalls 3 of 3 objects immediately. At 5 minutes recalls 3 of 3 objects. Able to copy figure. Speech is normal. Able to name objects, name parts of objects, repeat, read and write. Follows complex commands. Able to perform serial calculations. Able to spell words backwards. Digit span was 5 forward and 5 backward. Fund of knowledge is appropriate for level of education. MMSE score: 30.    Cranial Nerves  CN III, IV, VI: Extraocular movements intact bilaterally.       Assessment / Plan      Assessment/Plan:   Diagnoses and all orders for this visit:    1. Subjective memory complaints (Primary)    2. Syncope, unspecified syncope type    3. SUPA (obstructive sleep apnea)         Follow Up:   Return in about 6 months (around 7/2/2025), or if symptoms worsen or fail to improve.    Anticipatoiry Guidance and Safety Reviewed  Patient Education Provided  MMSE 30/30  Neuropsychology Results discussed   Discussed ATN profile testing and she would like to consider; risks and benefits reviewed  Discussed referral to board certified epileptologist for EMU and she would like to hold as she does not feel  she is having seizure activity and EEG was normal   Discussed start of Aricept or Namenda therapy for prevention and she would like to consider; risks and benefits reviewed  Keep FU with Pulmonary for SUPA mgmt     RTC PRN Or within 6 months or sooner with issues    Madison Putnam, DNP, APRN, FNP-C  [facility] Neurology and Sleep Medicine

## 2025-01-21 RX ORDER — METOPROLOL SUCCINATE 25 MG/1
TABLET, EXTENDED RELEASE ORAL
Status: CANCELLED | OUTPATIENT
Start: 2025-01-21

## 2025-01-21 RX ORDER — ESTRADIOL 0.5 MG/1
0.5 TABLET ORAL DAILY
Status: CANCELLED | OUTPATIENT
Start: 2025-01-21

## 2025-01-21 NOTE — TELEPHONE ENCOUNTER
Rx Refill Note  Requested Prescriptions     Pending Prescriptions Disp Refills    estradiol (ESTRACE) 0.5 MG tablet       Sig: Take 1 tablet by mouth Daily. Indications: Hair Loss    metoprolol succinate XL (TOPROL-XL) 25 MG 24 hr tablet        Last office visit with prescribing clinician: 11/25/2024   Last telemedicine visit with prescribing clinician: Visit date not found   Next office visit with prescribing clinician: 2/25/2025                         Would you like a call back once the refill request has been completed: [] Yes [] No    If the office needs to give you a call back, can they leave a voicemail: [] Yes [] No    Alicia Rivas MA  01/21/25, 13:10 EST

## 2025-01-21 NOTE — TELEPHONE ENCOUNTER
Caller: Harley Rodriges    Relationship: Self    Best call back number: [phone]     Requested Prescriptions:   Requested Prescriptions     Pending Prescriptions Disp Refills    estradiol (ESTRACE) 0.5 MG tablet       Sig: Take 1 tablet by mouth Daily. Indications: Hair Loss    metoprolol succinate XL (TOPROL-XL) 25 MG 24 hr tablet          Pharmacy where request should be sent: [facility] #[id number] - [place] - [address] - [phone] [facility] [phone]      Last office visit with prescribing clinician: 11/25/2024   Last telemedicine visit with prescribing clinician: Visit date not found   Next office visit with prescribing clinician: 2/25/2025     Does the patient have less than a 3 day supply:  [x] Yes  [] No    Would you like a call back once the refill request has been completed: [] Yes [x] No    If the office needs to give you a call back, can they leave a voicemail: [] Yes [x] No    Ltuher Das Rep   01/21/25 12:19 EST

## 2025-01-24 RX ORDER — ESTRADIOL 0.5 MG/1
0.5 TABLET ORAL DAILY
Qty: 90 TABLET | Refills: 3 | Status: SHIPPED | OUTPATIENT
Start: 2025-01-24

## 2025-01-24 RX ORDER — METOPROLOL SUCCINATE 25 MG/1
25 TABLET, EXTENDED RELEASE ORAL DAILY
Qty: 90 TABLET | Refills: 3 | Status: SHIPPED | OUTPATIENT
Start: 2025-01-24

## 2025-01-28 RX ORDER — PREGABALIN 100 MG/1
100 CAPSULE ORAL 2 TIMES DAILY
Qty: 60 CAPSULE | Refills: 2 | Status: CANCELLED | OUTPATIENT
Start: 2025-01-28

## 2025-01-28 RX ORDER — CELECOXIB 200 MG/1
CAPSULE ORAL
OUTPATIENT
Start: 2025-01-28

## 2025-01-28 NOTE — TELEPHONE ENCOUNTER
Rx Refill Note  Requested Prescriptions     Pending Prescriptions Disp Refills    pregabalin (Lyrica) 100 MG capsule 60 capsule 2     Sig: Take 1 capsule by mouth 2 (Two) Times a Day.     Refused Prescriptions Disp Refills    celecoxib (CeleBREX) 200 MG capsule       Refused By: ALICIA RIVAS     Reason for Refusal: Prescriber not at this practice    apixaban (ELIQUIS) 5 MG tablet tablet 60 tablet 0     Sig: Take 1 tablet by mouth Every 12 (Twelve) Hours. Indications: Atrial Fibrillation     Refused By: ALICIA RIVAS     Reason for Refusal: Prescriber not at this practice      Last office visit with prescribing clinician: 11/25/2024   Last telemedicine visit with prescribing clinician: Visit date not found   Next office visit with prescribing clinician: 1/21/2025                         Would you like a call back once the refill request has been completed: [] Yes [] No    If the office needs to give you a call back, can they leave a voicemail: [] Yes [] No    Alicia Rivas MA  01/28/25, 15:49 EST

## 2025-01-29 DIAGNOSIS — M54.50 CHRONIC BILATERAL LOW BACK PAIN WITHOUT SCIATICA: ICD-10-CM

## 2025-01-29 DIAGNOSIS — M79.7 FIBROMYALGIA: ICD-10-CM

## 2025-01-29 DIAGNOSIS — G62.9 PERIPHERAL POLYNEUROPATHY: ICD-10-CM

## 2025-01-29 DIAGNOSIS — G89.29 CHRONIC BILATERAL LOW BACK PAIN WITHOUT SCIATICA: ICD-10-CM

## 2025-01-29 RX ORDER — PREGABALIN 100 MG/1
100 CAPSULE ORAL 2 TIMES DAILY
Qty: 60 CAPSULE | Refills: 2 | Status: SHIPPED | OUTPATIENT
Start: 2025-01-29

## 2025-01-29 RX ORDER — CELECOXIB 200 MG/1
200 CAPSULE ORAL DAILY
Qty: 90 CAPSULE | Refills: 1 | Status: SHIPPED | OUTPATIENT
Start: 2025-01-29

## 2025-02-10 ENCOUNTER — TELEPHONE (OUTPATIENT)
Dept: FAMILY MEDICINE CLINIC | Facility: CLINIC | Age: 75
End: 2025-02-10

## 2025-02-10 NOTE — TELEPHONE ENCOUNTER
Caller: Harley Rodrigse    Relationship: Self    Best call back number: [phone]     Which medication are you concerned about: celecoxib (CeleBREX) 200 MG capsule     Who prescribed you this medication: AUSTIN EATON     When did you start taking this medication: OVER ONE YEAR     What are your concerns: THE PATIENT STATED SHE HAS HAD AN ISSUE TRYING TO GET THIS MEDICATION FROM [facility] SINCE 12/2024. THE PATIENT STATED SHE WAS ADVISED BY [facility] THAT SHE PICKED UP THE MEDICATION ON 01/09/2025, BUT THE PATIENT CANNOT FIND THE MEDICATION AND STATED THAT IF THEY DID DISPENSE THE MEDICATION, SHE DOES NOT HAVE IT. THE PATIENT STATED SHE IS IN A GREAT DEAL OF PAIN AND IS REQUESTING THAT AUSTIN EATON HELP HER WITH THIS ISSUE. THE PATIENT STATED SHE IS AWARE THAT A MED REFILL WAS SENT TO [facility] BY THE PRACTICE ON 01/29/2025, BUT WAS ADVISED BY [facility] THAT IT IS TOO EARLY TO FILL.     THE PATIENT ADVISED SHE DOES NOT WANT A RESPONSE THROUGH HER MYCHART BUT IT REQUESTING THAT SOMEONE PLEASE CALL THE PATIENT AND ADVISE.     How long have you had these concerns: SINCE 12/2024

## 2025-02-12 NOTE — TELEPHONE ENCOUNTER
I am fine if someone wants to call [facility] and get authorization for an early refill but that does not mean her insurance will pay for it.  This is really a pharmacy issue.  Pharmacy should call the insurance and request a loss medication or she can.

## 2025-02-25 ENCOUNTER — OFFICE VISIT (OUTPATIENT)
Dept: FAMILY MEDICINE CLINIC | Facility: CLINIC | Age: 75
End: 2025-02-25
Payer: MEDICARE

## 2025-02-25 VITALS
SYSTOLIC BLOOD PRESSURE: 122 MMHG | BODY MASS INDEX: 37.77 KG/M2 | OXYGEN SATURATION: 96 % | DIASTOLIC BLOOD PRESSURE: 80 MMHG | HEIGHT: 64 IN | WEIGHT: 221.25 LBS | HEART RATE: 70 BPM

## 2025-02-25 DIAGNOSIS — E11.40 TYPE 2 DIABETES MELLITUS WITH DIABETIC NEUROPATHY, WITHOUT LONG-TERM CURRENT USE OF INSULIN: ICD-10-CM

## 2025-02-25 DIAGNOSIS — G62.9 PERIPHERAL POLYNEUROPATHY: ICD-10-CM

## 2025-02-25 DIAGNOSIS — M79.7 FIBROMYALGIA: Primary | ICD-10-CM

## 2025-02-25 DIAGNOSIS — G47.33 OBSTRUCTIVE APNEA: ICD-10-CM

## 2025-02-25 DIAGNOSIS — D47.2 MGUS (MONOCLONAL GAMMOPATHY OF UNKNOWN SIGNIFICANCE): ICD-10-CM

## 2025-02-25 DIAGNOSIS — E03.9 HYPOTHYROIDISM, UNSPECIFIED TYPE: ICD-10-CM

## 2025-02-25 DIAGNOSIS — K21.9 GASTROESOPHAGEAL REFLUX DISEASE WITHOUT ESOPHAGITIS: ICD-10-CM

## 2025-02-25 DIAGNOSIS — I48.0 PAROXYSMAL ATRIAL FIBRILLATION: ICD-10-CM

## 2025-02-25 PROCEDURE — 1159F MED LIST DOCD IN RCRD: CPT | Performed by: NURSE PRACTITIONER

## 2025-02-25 PROCEDURE — 1160F RVW MEDS BY RX/DR IN RCRD: CPT | Performed by: NURSE PRACTITIONER

## 2025-02-25 PROCEDURE — G2211 COMPLEX E/M VISIT ADD ON: HCPCS | Performed by: NURSE PRACTITIONER

## 2025-02-25 PROCEDURE — 1125F AMNT PAIN NOTED PAIN PRSNT: CPT | Performed by: NURSE PRACTITIONER

## 2025-02-25 PROCEDURE — 99214 OFFICE O/P EST MOD 30 MIN: CPT | Performed by: NURSE PRACTITIONER

## 2025-02-25 NOTE — PROGRESS NOTES
Subjective     Chief Complaint:    Chief Complaint   Patient presents with    Diabetes     3 month follow up for Diabetes         History of Present Illness:   Afib/htn on eliquis, is having trouble affording, follows with Dr Waldrop, statin  Hypothyroidism, on NP thyroid  Diabetes, on metformin, had issue with jardiance and needs to restart, plan was to transition to jardiance to help with diabetes and cardiac conditions   Back pain/fibro/neuropathy, on lyrica, helping but feels like the fibro and muscle pain is a little worse   RA does not take anything for it other than tylenol  Insomnia on trazodone   Recently got a dog and that has helped her    [facility], Dr Pacheco    [place], on cpap recently, sleeping well with trazodone   Some weight on her chest symptoms more in the evening, HR will be normal, BP normal as well, is able to walk her new dog without problems, no progressing symptoms       Review of Systems  Gen- No fevers, chills  Resp- No cough, dyspnea  GI- No N/V/D, abd pain  Neuro-No dizziness, headaches      I have reviewed and/or updated the patient's past medical, surgical, family, social history and problem list as appropriate.     Medications:    Current Outpatient Medications:     acetaminophen (TYLENOL) 500 MG tablet, Take 1 tablet by mouth Every 6 (Six) Hours As Needed for Mild Pain. Indications: Pain, Disp: , Rfl:     apixaban (ELIQUIS) 5 MG tablet tablet, Take 1 tablet by mouth Every 12 (Twelve) Hours. Indications: Atrial Fibrillation, Disp: 60 tablet, Rfl: 0    celecoxib (CeleBREX) 200 MG capsule, Take 1 capsule by mouth Daily., Disp: 90 capsule, Rfl: 1    dicyclomine (BENTYL) 20 MG tablet, Take 1 tablet by mouth 3 (Three) Times a Day As Needed (lower abdominal pain and diarrhea). (Patient taking differently: Take 1 tablet by mouth As Needed (lower abdominal pain and diarrhea).), Disp: 30 tablet, Rfl: 1    DULoxetine (CYMBALTA) 60 MG capsule, Take 1 capsule by mouth 2 (Two) Times a Day.  "Indications: Peripheral Nerve Disease, Disp: 180 capsule, Rfl: 1    estradiol (ESTRACE) 0.5 MG tablet, Take 1 tablet by mouth Daily. Indications: Hair Loss, Disp: 90 tablet, Rfl: 3    loratadine (CLARITIN) 10 MG tablet, Take 1 tablet by mouth Daily. Indications: Hayfever, Disp: , Rfl:     losartan (COZAAR) 50 MG tablet, Take 1 tablet by mouth Daily. Indications: High Blood Pressure, Disp: , Rfl:     meclizine (ANTIVERT) 25 MG tablet, Take 1 tablet by mouth 3 (Three) Times a Day As Needed for Dizziness., Disp: , Rfl:     metoprolol succinate XL (TOPROL-XL) 25 MG 24 hr tablet, Take 1 tablet by mouth Daily., Disp: 90 tablet, Rfl: 3    Multiple Vitamins-Minerals (HAIR SKIN NAILS PO), Take 3 tablets by mouth Daily. Indications: Supplement, Disp: , Rfl:     Multiple Vitamins-Minerals (ONE-A-DAY WOMENS 50+ PO), Take  by mouth. Indications: Supplement, Disp: , Rfl:     omeprazole (priLOSEC) 40 MG capsule, TAKE 1 CAPSULE BY MOUTH ONCE EVERY MORNING 30 MINUTES PRIOR TO BREAKFAST, Disp: 30 capsule, Rfl: 1    pregabalin (Lyrica) 100 MG capsule, Take 1 capsule by mouth 2 (Two) Times a Day., Disp: 60 capsule, Rfl: 2    rosuvastatin (CRESTOR) 10 MG tablet, , Disp: , Rfl:     Thyroid 60 MG PO tablet, Take 1 tablet by mouth Daily. Indications: Underactive Thyroid, Disp: 90 tablet, Rfl: 1    traZODone (DESYREL) 100 MG tablet, Take 0.5 tablets by mouth every night at bedtime. Indications: Anxiety Disorder, Trouble Sleeping, Disp: , Rfl:     Unable to find, 1 each 1 (One) Time. Berberine, Disp: , Rfl:     vitamin D3 125 MCG (5000 UT) capsule capsule, Take 1 capsule by mouth Daily. Indications: Supplement, Disp: , Rfl:     empagliflozin (Jardiance) 10 MG tablet tablet, Take 1 tablet by mouth Daily., Disp: 30 tablet, Rfl: 2    Allergies:  No Known Allergies    Objective     Vital Signs:   Vitals:    02/25/25 1158   BP: 122/80   Pulse: 70   SpO2: 96%   Weight: 100 kg (221 lb 4 oz)   Height: 162.6 cm (64.02\")     Body mass index is 37.96 " kg/m².    Physical Exam:    Physical Exam  Vitals and nursing note reviewed.   Constitutional:       Appearance: She is well-developed. She is obese.   HENT:      Head: Normocephalic and atraumatic.   Eyes:      Pupils: Pupils are equal, round, and reactive to light.   Cardiovascular:      Rate and Rhythm: Normal rate and regular rhythm.      Heart sounds: Normal heart sounds.   Pulmonary:      Effort: Pulmonary effort is normal.      Breath sounds: Normal breath sounds.   Abdominal:      General: Bowel sounds are normal. There is no distension.      Palpations: Abdomen is soft.      Tenderness: There is no abdominal tenderness.   Musculoskeletal:      Cervical back: Neck supple.   Skin:     General: Skin is warm and dry.   Neurological:      General: No focal deficit present.      Mental Status: She is alert and oriented to person, place, and time.   Psychiatric:         Mood and Affect: Mood normal.         Behavior: Behavior normal.         Assessment / Plan     Assessment/Plan:   Problem List Items Addressed This Visit       Obstructive apnea    Gastroesophageal reflux disease (Chronic)    Relevant Medications    dicyclomine (BENTYL) 20 MG tablet    omeprazole (priLOSEC) 40 MG capsule    MGUS (monoclonal gammopathy of unknown significance)    Relevant Medications    celecoxib (CeleBREX) 200 MG capsule    Hypothyroidism    Relevant Medications    Thyroid 60 MG PO tablet    metoprolol succinate XL (TOPROL-XL) 25 MG 24 hr tablet    Fibromyalgia - Primary    Relevant Medications    pregabalin (Lyrica) 100 MG capsule    Peripheral polyneuropathy    Relevant Medications    pregabalin (Lyrica) 100 MG capsule     Other Visit Diagnoses       Type 2 diabetes mellitus with diabetic neuropathy, without long-term current use of insulin        Relevant Medications    empagliflozin (Jardiance) 10 MG tablet tablet    Other Relevant Orders    Lipid Panel    TSH    Microalbumin / Creatinine Urine Ratio - Urine, Clean Catch     Hemoglobin A1c    Paroxysmal atrial fibrillation              -- change metformin to jardiance  -- continue other meds  -- contnue f/u with cards  -- continue f/u with hem/onc    Discussed plan of care in detail with pt today; pt verb understanding and agrees.    Follow up:  3 months     Electronically signed by AUSTIN Hickey   02/25/2025 12:15 EST      Please note that portions of this note were completed with a voice recognition program.

## 2025-03-07 DIAGNOSIS — K21.00 GASTROESOPHAGEAL REFLUX DISEASE WITH ESOPHAGITIS WITHOUT HEMORRHAGE: Chronic | ICD-10-CM

## 2025-03-07 LAB
ALBUMIN/CREAT UR: 128 MG/G CREAT (ref 0–29)
CHOLEST SERPL-MCNC: 148 MG/DL (ref 0–200)
CREAT UR-MCNC: 91.4 MG/DL
HBA1C MFR BLD: 7.8 % (ref 4.8–5.6)
HDLC SERPL-MCNC: 52 MG/DL (ref 40–60)
LDLC SERPL CALC-MCNC: 66 MG/DL (ref 0–100)
MICROALBUMIN UR-MCNC: 116.9 UG/ML
TRIGL SERPL-MCNC: 177 MG/DL (ref 0–150)
TSH SERPL DL<=0.005 MIU/L-ACNC: 0.17 UIU/ML (ref 0.27–4.2)
VLDLC SERPL CALC-MCNC: 30 MG/DL (ref 5–40)

## 2025-03-07 NOTE — TELEPHONE ENCOUNTER
Rx Refill Note  Requested Prescriptions     Pending Prescriptions Disp Refills    omeprazole (priLOSEC) 40 MG capsule 30 capsule 1     Sig: TAKE 1 CAPSULE BY MOUTH ONCE EVERY MORNING 30 MINUTES PRIOR TO BREAKFAST      Last office visit with prescribing clinician: Visit date not found   Last telemedicine visit with prescribing clinician: Visit date not found   Next office visit with prescribing clinician: Visit date not found                         Would you like a call back once the refill request has been completed: [] Yes [] No    If the office needs to give you a call back, can they leave a voicemail: [] Yes [] No    Roni Evangelista MA  03/07/25, 15:06 EST

## 2025-03-10 RX ORDER — OMEPRAZOLE 40 MG/1
CAPSULE, DELAYED RELEASE ORAL
Qty: 30 CAPSULE | Refills: 1 | OUTPATIENT
Start: 2025-03-10

## 2025-03-12 DIAGNOSIS — G89.29 CHRONIC BILATERAL LOW BACK PAIN WITHOUT SCIATICA: ICD-10-CM

## 2025-03-12 DIAGNOSIS — M79.7 FIBROMYALGIA: ICD-10-CM

## 2025-03-12 DIAGNOSIS — M54.50 CHRONIC BILATERAL LOW BACK PAIN WITHOUT SCIATICA: ICD-10-CM

## 2025-03-12 DIAGNOSIS — K21.00 GASTROESOPHAGEAL REFLUX DISEASE WITH ESOPHAGITIS WITHOUT HEMORRHAGE: Chronic | ICD-10-CM

## 2025-03-12 DIAGNOSIS — E03.9 HYPOTHYROIDISM, UNSPECIFIED TYPE: ICD-10-CM

## 2025-03-12 DIAGNOSIS — G62.9 PERIPHERAL POLYNEUROPATHY: ICD-10-CM

## 2025-03-12 RX ORDER — OMEPRAZOLE 40 MG/1
CAPSULE, DELAYED RELEASE ORAL
Qty: 90 CAPSULE | Refills: 1 | Status: SHIPPED | OUTPATIENT
Start: 2025-03-12

## 2025-03-12 RX ORDER — METOPROLOL SUCCINATE 25 MG/1
25 TABLET, EXTENDED RELEASE ORAL DAILY
Qty: 90 TABLET | Refills: 3 | Status: SHIPPED | OUTPATIENT
Start: 2025-03-12

## 2025-03-12 RX ORDER — PREGABALIN 100 MG/1
100 CAPSULE ORAL 2 TIMES DAILY
Qty: 60 CAPSULE | Refills: 2 | Status: SHIPPED | OUTPATIENT
Start: 2025-03-12

## 2025-03-12 RX ORDER — THYROID 30 MG/1
30 TABLET ORAL DAILY
Qty: 30 TABLET | Refills: 2 | Status: SHIPPED | OUTPATIENT
Start: 2025-03-12

## 2025-03-12 RX ORDER — DULOXETIN HYDROCHLORIDE 60 MG/1
60 CAPSULE, DELAYED RELEASE ORAL 2 TIMES DAILY
Qty: 180 CAPSULE | Refills: 1 | Status: SHIPPED | OUTPATIENT
Start: 2025-03-12

## 2025-03-12 NOTE — TELEPHONE ENCOUNTER
PT WAS GETTING OMEPRAZOLE FROM HER GASTRO DOCTOR BUT THEY TOLD HER THAT HER PRIMARY WOULD NEED TO SEND IT IN FOR HER NOW SO SHE'S REQUESTING A PRESCRIPTION FOR THAT AS WELL

## 2025-03-12 NOTE — TELEPHONE ENCOUNTER
Rx Refill Note  Requested Prescriptions     Pending Prescriptions Disp Refills    pregabalin (Lyrica) 100 MG capsule 60 capsule 2     Sig: Take 1 capsule by mouth 2 (Two) Times a Day.    omeprazole (priLOSEC) 40 MG capsule 30 capsule 1     Sig: TAKE 1 CAPSULE BY MOUTH ONCE EVERY MORNING 30 MINUTES PRIOR TO BREAKFAST     Signed Prescriptions Disp Refills    DULoxetine (CYMBALTA) 60 MG capsule 180 capsule 1     Sig: Take 1 capsule by mouth 2 (Two) Times a Day. Indications: Peripheral Nerve Disease     Authorizing Provider: CHERYL AREVALO     Ordering User: CELIA RUSH    metoprolol succinate XL (TOPROL-XL) 25 MG 24 hr tablet 90 tablet 3     Sig: Take 1 tablet by mouth Daily.     Authorizing Provider: CHERYL AREVALO     Ordering User: CELIA RUSH      Last office visit with prescribing clinician: 2/25/2025   Last telemedicine visit with prescribing clinician: Visit date not found   Next office visit with prescribing clinician: 5/27/2025                         Would you like a call back once the refill request has been completed: [] Yes [] No    If the office needs to give you a call back, can they leave a voicemail: [] Yes [] No    Celia Moore CMA  03/12/25, 12:25 EDT

## 2025-03-12 NOTE — TELEPHONE ENCOUNTER
Omeprazole prev prescribed by gastro, patient was told that she would need to reach out to pcp for further refills. Please advise. WK

## 2025-03-14 ENCOUNTER — APPOINTMENT (OUTPATIENT)
Dept: GENERAL RADIOLOGY | Facility: HOSPITAL | Age: 75
End: 2025-03-14
Payer: MEDICARE

## 2025-03-14 ENCOUNTER — HOSPITAL ENCOUNTER (EMERGENCY)
Facility: HOSPITAL | Age: 75
Discharge: HOME | End: 2025-03-14
Attending: EMERGENCY MEDICINE
Payer: MEDICARE

## 2025-03-14 VITALS
WEIGHT: 220.46 LBS | RESPIRATION RATE: 18 BRPM | HEIGHT: 64 IN | SYSTOLIC BLOOD PRESSURE: 166 MMHG | TEMPERATURE: 98.1 F | HEART RATE: 61 BPM | BODY MASS INDEX: 37.64 KG/M2 | DIASTOLIC BLOOD PRESSURE: 100 MMHG | OXYGEN SATURATION: 91 %

## 2025-03-14 DIAGNOSIS — R53.1 GENERALIZED WEAKNESS: Primary | ICD-10-CM

## 2025-03-14 DIAGNOSIS — R79.89 ABNORMAL SERUM THYROID STIMULATING HORMONE (TSH) LEVEL: ICD-10-CM

## 2025-03-14 LAB
ALBUMIN SERPL-MCNC: 4.3 G/DL (ref 3.5–5.2)
ALBUMIN/GLOB SERPL: 1.5 G/DL
ALP SERPL-CCNC: 57 U/L (ref 39–117)
ALT SERPL W P-5'-P-CCNC: 19 U/L (ref 1–33)
ANION GAP SERPL CALCULATED.3IONS-SCNC: 7.8 MMOL/L (ref 5–15)
AST SERPL-CCNC: 33 U/L (ref 1–32)
BASOPHILS # BLD AUTO: 0.02 10*3/MM3 (ref 0–0.2)
BASOPHILS NFR BLD AUTO: 0.4 % (ref 0–1.5)
BILIRUB SERPL-MCNC: 0.2 MG/DL (ref 0–1.2)
BILIRUB UR QL STRIP: NEGATIVE
BUN SERPL-MCNC: 23 MG/DL (ref 8–23)
BUN/CREAT SERPL: 18.9 (ref 7–25)
CALCIUM SPEC-SCNC: 10 MG/DL (ref 8.6–10.5)
CHLORIDE SERPL-SCNC: 98 MMOL/L (ref 98–107)
CLARITY UR: CLEAR
CO2 SERPL-SCNC: 27.2 MMOL/L (ref 22–29)
COLOR UR: YELLOW
CREAT SERPL-MCNC: 1.22 MG/DL (ref 0.57–1)
DEPRECATED RDW RBC AUTO: 44.6 FL (ref 37–54)
EGFRCR SERPLBLD CKD-EPI 2021: 46.1 ML/MIN/1.73
EOSINOPHIL # BLD AUTO: 0.12 10*3/MM3 (ref 0–0.4)
EOSINOPHIL # BLD MANUAL: 0.17 10*3/MM3 (ref 0–0.4)
EOSINOPHIL NFR BLD AUTO: 2.1 % (ref 0.3–6.2)
EOSINOPHIL NFR BLD MANUAL: 3 % (ref 0.3–6.2)
ERYTHROCYTE [DISTWIDTH] IN BLOOD BY AUTOMATED COUNT: 13.4 % (ref 12.3–15.4)
FLUAV RNA RESP QL NAA+PROBE: NOT DETECTED
FLUBV RNA RESP QL NAA+PROBE: NOT DETECTED
GEN 5 1HR TROPONIN T REFLEX: 10 NG/L
GLOBULIN UR ELPH-MCNC: 2.9 GM/DL
GLUCOSE SERPL-MCNC: 145 MG/DL (ref 65–99)
GLUCOSE UR STRIP-MCNC: ABNORMAL MG/DL
HCT VFR BLD AUTO: 33.1 % (ref 34–46.6)
HGB BLD-MCNC: 10.9 G/DL (ref 12–15.9)
HGB UR QL STRIP.AUTO: NEGATIVE
HOLD SPECIMEN: NORMAL
HOLD SPECIMEN: NORMAL
IMM GRANULOCYTES # BLD AUTO: 0.03 10*3/MM3 (ref 0–0.05)
IMM GRANULOCYTES NFR BLD AUTO: 0.5 % (ref 0–0.5)
KETONES UR QL STRIP: NEGATIVE
LEUKOCYTE ESTERASE UR QL STRIP.AUTO: NEGATIVE
LYMPHOCYTES # BLD AUTO: 2.93 10*3/MM3 (ref 0.7–3.1)
LYMPHOCYTES # BLD MANUAL: 3.35 10*3/MM3 (ref 0.7–3.1)
LYMPHOCYTES NFR BLD AUTO: 51.7 % (ref 19.6–45.3)
LYMPHOCYTES NFR BLD MANUAL: 5 % (ref 5–12)
MAGNESIUM SERPL-MCNC: 2 MG/DL (ref 1.6–2.4)
MCH RBC QN AUTO: 30 PG (ref 26.6–33)
MCHC RBC AUTO-ENTMCNC: 32.9 G/DL (ref 31.5–35.7)
MCV RBC AUTO: 91.2 FL (ref 79–97)
MONOCYTES # BLD AUTO: 0.44 10*3/MM3 (ref 0.1–0.9)
MONOCYTES # BLD: 0.28 10*3/MM3 (ref 0.1–0.9)
MONOCYTES NFR BLD AUTO: 7.8 % (ref 5–12)
NEUTROPHILS # BLD AUTO: 1.87 10*3/MM3 (ref 1.7–7)
NEUTROPHILS NFR BLD AUTO: 2.13 10*3/MM3 (ref 1.7–7)
NEUTROPHILS NFR BLD AUTO: 37.5 % (ref 42.7–76)
NEUTROPHILS NFR BLD MANUAL: 33 % (ref 42.7–76)
NITRITE UR QL STRIP: NEGATIVE
NRBC BLD AUTO-RTO: 0 /100 WBC (ref 0–0.2)
NT-PROBNP SERPL-MCNC: 229.4 PG/ML (ref 0–1800)
PH UR STRIP.AUTO: 7.5 [PH] (ref 5–8)
PLAT MORPH BLD: NORMAL
PLATELET # BLD AUTO: 277 10*3/MM3 (ref 140–450)
PMV BLD AUTO: 9.1 FL (ref 6–12)
POTASSIUM SERPL-SCNC: 5 MMOL/L (ref 3.5–5.2)
PROT SERPL-MCNC: 7.2 G/DL (ref 6–8.5)
PROT UR QL STRIP: NEGATIVE
RBC # BLD AUTO: 3.63 10*6/MM3 (ref 3.77–5.28)
RBC MORPH BLD: NORMAL
SARS-COV-2 RNA RESP QL NAA+PROBE: NOT DETECTED
SODIUM SERPL-SCNC: 133 MMOL/L (ref 136–145)
SP GR UR STRIP: 1.01 (ref 1–1.03)
T4 FREE SERPL-MCNC: 0.77 NG/DL (ref 0.92–1.68)
TROPONIN T NUMERIC DELTA: 2 NG/L
TROPONIN T SERPL HS-MCNC: 8 NG/L
TSH SERPL DL<=0.05 MIU/L-ACNC: 0.07 UIU/ML (ref 0.27–4.2)
TSH SERPL DL<=0.05 MIU/L-ACNC: 0.07 UIU/ML (ref 0.27–4.2)
UROBILINOGEN UR QL STRIP: ABNORMAL
VARIANT LYMPHS NFR BLD MANUAL: 2 % (ref 0–5)
VARIANT LYMPHS NFR BLD MANUAL: 57 % (ref 19.6–45.3)
WBC MORPH BLD: NORMAL
WBC NRBC COR # BLD AUTO: 5.67 10*3/MM3 (ref 3.4–10.8)
WHOLE BLOOD HOLD COAG: NORMAL
WHOLE BLOOD HOLD SPECIMEN: NORMAL

## 2025-03-14 PROCEDURE — 83735 ASSAY OF MAGNESIUM: CPT | Performed by: NURSE PRACTITIONER

## 2025-03-14 PROCEDURE — 71045 X-RAY EXAM CHEST 1 VIEW: CPT

## 2025-03-14 PROCEDURE — 36415 COLL VENOUS BLD VENIPUNCTURE: CPT

## 2025-03-14 PROCEDURE — 99284 EMERGENCY DEPT VISIT MOD MDM: CPT | Performed by: EMERGENCY MEDICINE

## 2025-03-14 PROCEDURE — 93005 ELECTROCARDIOGRAM TRACING: CPT | Performed by: EMERGENCY MEDICINE

## 2025-03-14 PROCEDURE — 83880 ASSAY OF NATRIURETIC PEPTIDE: CPT | Performed by: NURSE PRACTITIONER

## 2025-03-14 PROCEDURE — 84443 ASSAY THYROID STIM HORMONE: CPT | Performed by: NURSE PRACTITIONER

## 2025-03-14 PROCEDURE — 87636 SARSCOV2 & INF A&B AMP PRB: CPT | Performed by: NURSE PRACTITIONER

## 2025-03-14 PROCEDURE — 84484 ASSAY OF TROPONIN QUANT: CPT | Performed by: NURSE PRACTITIONER

## 2025-03-14 PROCEDURE — 85025 COMPLETE CBC W/AUTO DIFF WBC: CPT | Performed by: NURSE PRACTITIONER

## 2025-03-14 PROCEDURE — 85007 BL SMEAR W/DIFF WBC COUNT: CPT | Performed by: NURSE PRACTITIONER

## 2025-03-14 PROCEDURE — 80053 COMPREHEN METABOLIC PANEL: CPT | Performed by: NURSE PRACTITIONER

## 2025-03-14 PROCEDURE — 84439 ASSAY OF FREE THYROXINE: CPT | Performed by: NURSE PRACTITIONER

## 2025-03-14 PROCEDURE — 81003 URINALYSIS AUTO W/O SCOPE: CPT | Performed by: NURSE PRACTITIONER

## 2025-03-14 RX ORDER — SODIUM CHLORIDE 0.9 % (FLUSH) 0.9 %
10 SYRINGE (ML) INJECTION AS NEEDED
Status: DISCONTINUED | OUTPATIENT
Start: 2025-03-14 | End: 2025-03-14 | Stop reason: HOSPADM

## 2025-03-15 NOTE — ED PROVIDER NOTES
Pt Name: Harley Rodriges  MRN: [id number]  : 1950  Date of Encounter: 3/14/2025    PCP: Amy Baeza APRN      Subjective    History of Present Illness:    Chief Complaint: Generalized    History of Present Illness: Harley Rodriges is a 74 y.o. female who presents to the ER complaining of generalized weak that started Monday.  Patient states she has had episodes where her heart rate has increased into the 1 teens and she has felt weak.  Patient denies any dizziness change in vision headache.  Patient called her cardiologist and told her to come to the emergency room for evaluation.  Patient denies any cough, congestion, shortness of breath..      Triage Vitals:    ED Triage Vitals [25 1453]   Temp Heart Rate Resp BP SpO2   98.1 °F (36.7 °C) 65 18 153/88 95 %      Temp src Heart Rate Source Patient Position BP Location FiO2 (%)   Oral Monitor Sitting Left arm --       Nurses Notes reviewed and agree, including vitals, allergies, social history and prior medical history.     Patient has no known allergies.    Past Medical History:   Diagnosis Date    Anemia 1966    Anxiety and depression     Atrial fibrillation 2023    Bell palsy 2017.     2023    Twice    Bradycardia     Cancer 2019    Squamous cell nose    Cataract     Colon polyp 2016    Coronary artery disease 2016    Bradycardia    COVID-19 vaccine series completed     Moderna x2, plus a booster    Diabetes mellitus     Diabetic polyneuropathy     Difficulty walking     Disease of thyroid gland     Duodenal ulcer 2022    Ear piercing     Elevated cholesterol     Erosive gastropathy 2022    Esophagitis     Fatty liver 2010    Fibromyalgia     Fibromyalgia     Fibromyalgia, primary 2000    Gastritis     GERD (gastroesophageal reflux disease)     Head injury 1967    Concussion    Headache, tension-type 1966    History of colon polyps     History of nuclear stress test 2013    HL (hearing loss) 2020    Hearing aids  2023    Hyperlipidemia     Hypertension     Hypothyroidism 2010    Iron deficiency     Irritable bowel syndrome 2015    [place] grade C esophagitis 07/07/2022    Low back pain 1966    Lyme disease 2016    Memory loss 2015    Migraine 1970    Obesity 1973    Pneumonia     Problems with swallowing     food and liquids    Rheumatoid arthritis     Seasonal allergies     Shingles 2016    Sleep apnea     no CPAP    Tremor 2022    Ulcer 2022    Endoscopy recent    Ulcer of esophagus without bleeding 07/07/2022    Urinary frequency     Vision loss 2023    Cataracts/floaters    Wears glasses     for reading       Past Surgical History:   Procedure Laterality Date    APPENDECTOMY      1980    BREAST LUMPECTOMY WITH SENTINEL NODE BIOPSY AND AXILLARY NODE DISSECTION      1981-benign    BREAST SURGERY  1980    Benign RT    CARDIAC CATHETERIZATION      2015- no stents    COLONOSCOPY  2016    COLONOSCOPY N/A 01/15/2021    Procedure: COLONOSCOPY WITH BIOSPIES AND POLYPECTOMY;  Surgeon: Yuridia Vinson MD;  Location: [facility] ENDOSCOPY;  Service: Gastroenterology;  Laterality: N/A;    COLONOSCOPY W/ BIOPSIES  2016    Dr. Cason    ENDOSCOPY  2016    Dr. Cason    ENDOSCOPY N/A 05/12/2022    Procedure: ESOPHAGOGASTRODUODENOSCOPY WITH BIOPSY;  Surgeon: Yuridia Vinson MD;  Location: [facility] ENDOSCOPY;  Service: Gastroenterology;  Laterality: N/A;    ENDOSCOPY N/A 01/13/2023    Procedure: ESOPHAGOGASTRODUODENOSCOPY, biopsy;  Surgeon: Yuridia Vinson MD;  Location: [facility] ENDOSCOPY;  Service: Gastroenterology;  Laterality: N/A;    HYSTERECTOMY      1980    TONSILLECTOMY      1956 at age 10    TUBAL ABDOMINAL LIGATION      UPPER GASTROINTESTINAL ENDOSCOPY  2016    WISDOM TOOTH EXTRACTION         Social History     Socioeconomic History    Marital status:    Tobacco Use    Smoking status: Never     Passive exposure: Never    Smokeless tobacco: Never   Vaping Use    Vaping status: Never Used   Substance and  Sexual Activity    Alcohol use: Never    Drug use: Never    Sexual activity: Not Currently     Partners: Male     Birth control/protection: Post-menopausal, Tubal ligation, Birth control pill, Hysterectomy       Family History   Problem Relation Age of Onset    Heart disease Mother     Skin cancer Mother     Anxiety disorder Mother     Hearing loss Mother     Hypertension Mother     Heart disease Father     Arthritis Father     Polymyalgia rheumatica Father     Skin cancer Father     Dementia Father     Neuropathy Father     Hearing loss Father     Hypertension Father     Vision loss Father     Mental retardation Paternal Uncle         Downs Syndrome    Alcohol abuse Maternal Uncle     Cancer Maternal Uncle     Cancer Paternal Aunt     Cancer Paternal Uncle     Diabetes Paternal Uncle     Diabetes Brother     Hearing loss Brother     Heart disease Brother     Hypertension Brother     Hearing loss Brother     Vision loss Paternal Grandmother     Colon cancer Neg Hx     Inflammatory bowel disease Neg Hx     Breast cancer Neg Hx        REVIEW OF SYSTEMS:     All systems reviewed and not pertinent unless noted.    Review of Systems   Cardiovascular:  Positive for palpitations.   Neurological:  Positive for weakness.   All other systems reviewed and are negative.      Objective    Physical Exam  Vitals and nursing note reviewed.   Constitutional:       Appearance: Normal appearance.   HENT:      Head: Normocephalic and atraumatic.   Eyes:      Extraocular Movements: Extraocular movements intact.      Pupils: Pupils are equal, round, and reactive to light.   Cardiovascular:      Rate and Rhythm: Normal rate and regular rhythm.      Pulses: Normal pulses.      Heart sounds: Normal heart sounds.   Pulmonary:      Effort: Pulmonary effort is normal.      Breath sounds: Decreased breath sounds present.   Abdominal:      General: Abdomen is flat. Bowel sounds are normal.      Palpations: Abdomen is soft.   Musculoskeletal:       Cervical back: Normal range of motion and neck supple.      Right lower leg: Edema present.      Left lower leg: Edema present.   Skin:     General: Skin is warm and dry.      Capillary Refill: Capillary refill takes less than 2 seconds.   Neurological:      General: No focal deficit present.      Mental Status: She is alert and oriented to person, place, and time. Mental status is at baseline.      GCS: GCS eye subscore is 4. GCS verbal subscore is 5. GCS motor subscore is 6.      Sensory: Sensation is intact.      Motor: Motor function is intact.      Gait: Gait is intact.   Psychiatric:         Attention and Perception: Attention and perception normal.         Mood and Affect: Mood and affect normal.         Speech: Speech normal.         Behavior: Behavior normal. Behavior is cooperative.                           Procedures    ED Course:    ECG 12 Lead Other; ER triage   Final Result          ED Course as of 03/14/25 2015   Fri Mar 14, 2025   1629 EKG: I reviewed and independently interpreted the EKG as:  Sinus rhythm at 63/min, normal axis, normal intervals, no ST elevation, no T wave inversions [CS]      ED Course User Index  [CS] Blaine Marshall MD       Orders placed during this visit:    Orders Placed This Encounter   Procedures    COVID PRE-OP / PRE-PROCEDURE SCREENING ORDER (NO ISOLATION) - Swab, Nasopharynx    COVID-19 and FLU A/B PCR, 1 HR TAT - Swab, Nasopharynx    XR Chest 1 View    [place] Draw    Comprehensive Metabolic Panel    Urinalysis With Culture If Indicated - Urine, Clean Catch    High Sensitivity Troponin T    BNP    Magnesium    TSH    TSH Rfx On Abnormal To Free T4    CBC Auto Differential    Manual Differential    T4, Free    High Sensitivity Troponin T 1Hr    ECG 12 Lead Other; ER triage    Insert peripheral IV    Insert Peripheral IV    Green Top (Gel)    Lavender Top    Gold Top - SST    Light Blue Top    CBC & Differential       LAB Results:    Lab Results (last 24 hours)        Procedure Component Value Units Date/Time    CBC & Differential [[id number]]  (Abnormal) Collected: 03/14/25 1603    Specimen: Blood Updated: 03/14/25 1725    Narrative:      The following orders were created for panel order CBC & Differential.  Procedure                               Abnormality         Status                     ---------                               -----------         ------                     CBC Auto Differential[[id number]]        Abnormal            Final result               Scan Slide[[id number]]                                                                    Please view results for these tests on the individual orders.    Comprehensive Metabolic Panel [[id number]]  (Abnormal) Collected: 03/14/25 1603    Specimen: Blood Updated: 03/14/25 1638     Glucose 145 mg/dL      BUN 23 mg/dL      Creatinine 1.22 mg/dL      Sodium 133 mmol/L      Potassium 5.0 mmol/L      Comment: Specimen hemolyzed.  Result may be falsely elevated.        Chloride 98 mmol/L      CO2 27.2 mmol/L      Calcium 10.0 mg/dL      Total Protein 7.2 g/dL      Albumin 4.3 g/dL      ALT (SGPT) 19 U/L      Comment: Specimen hemolyzed.  Result may  be falsely elevated.        AST (SGOT) 33 U/L      Comment: Specimen hemolyzed.  Result may be falsely elevated.        Alkaline Phosphatase 57 U/L      Total Bilirubin 0.2 mg/dL      Globulin 2.9 gm/dL      A/G Ratio 1.5 g/dL      BUN/Creatinine Ratio 18.9     Anion Gap 7.8 mmol/L      eGFR 46.1 mL/min/1.73     Narrative:      GFR Categories in Chronic Kidney Disease (CKD)      GFR Category          GFR (mL/min/1.73)    Interpretation  G1                     90 or greater         Normal or high (1)  G2                      60-89                Mild decrease (1)  G3a                   45-59                Mild to moderate decrease  G3b                   30-44                Moderate to severe decrease  G4                    15-29                Severe decrease  G5                     14 or less           Kidney failure          (1)In the absence of evidence of kidney disease, neither GFR category G1 or G2 fulfill the criteria for CKD.    eGFR calculation 2021 CKD-EPI creatinine equation, which does not include race as a factor    High Sensitivity Troponin T [[id number]]  (Normal) Collected: 03/14/25 1603    Specimen: Blood Updated: 03/14/25 1647     HS Troponin T 8 ng/L      Comment: Specimen hemolyzed.  Results may be falsely decreased.       Narrative:      High Sensitive Troponin T Reference Range:  <14.0 ng/L- Negative Female for AMI  <22.0 ng/L- Negative Male for AMI  >=14 - Abnormal Female indicating possible myocardial injury.  >=22 - Abnormal Male indicating possible myocardial injury.   Clinicians would have to utilize clinical acumen, EKG, Troponin, and serial changes to determine if it is an Acute Myocardial Infarction or myocardial injury due to an underlying chronic condition.         BNP [[id number]]  (Normal) Collected: 03/14/25 1603    Specimen: Blood Updated: 03/14/25 1647     proBNP 229.4 pg/mL     Narrative:      This assay is used as an aid in the diagnosis of individuals suspected of having heart failure. It can be used as an aid in the diagnosis of acute decompensated heart failure (ADHF) in patients presenting with signs and symptoms of ADHF to the emergency department (ED). In addition, NT-proBNP of <300 pg/mL indicates ADHF is not likely.    Age Range Result Interpretation  NT-proBNP Concentration (pg/mL:      <50             Positive            >450                   Gray                 300-450                    Negative             <300    50-75           Positive            >900                  Gray                300-900                  Negative            <300      >75             Positive            >1800                  Gray                300-1800                  Negative            <300    Magnesium [[id number]]  (Normal) Collected: 03/14/25 1603     Specimen: Blood Updated: 03/14/25 1638     Magnesium 2.0 mg/dL     TSH [[id number]]  (Abnormal) Collected: 03/14/25 1603    Specimen: Blood Updated: 03/14/25 1647     TSH 0.073 uIU/mL     TSH Rfx On Abnormal To Free T4 [[id number]]  (Abnormal) Collected: 03/14/25 1603    Specimen: Blood Updated: 03/14/25 1647     TSH 0.073 uIU/mL     CBC Auto Differential [[id number]]  (Abnormal) Collected: 03/14/25 1603    Specimen: Blood Updated: 03/14/25 1624     WBC 5.67 10*3/mm3      RBC 3.63 10*6/mm3      Hemoglobin 10.9 g/dL      Hematocrit 33.1 %      MCV 91.2 fL      MCH 30.0 pg      MCHC 32.9 g/dL      RDW 13.4 %      RDW-SD 44.6 fl      MPV 9.1 fL      Platelets 277 10*3/mm3      Neutrophil % 37.5 %      Lymphocyte % 51.7 %      Monocyte % 7.8 %      Eosinophil % 2.1 %      Basophil % 0.4 %      Immature Grans % 0.5 %      Neutrophils, Absolute 2.13 10*3/mm3      Lymphocytes, Absolute 2.93 10*3/mm3      Monocytes, Absolute 0.44 10*3/mm3      Eosinophils, Absolute 0.12 10*3/mm3      Basophils, Absolute 0.02 10*3/mm3      Immature Grans, Absolute 0.03 10*3/mm3      nRBC 0.0 /100 WBC     Manual Differential [[id number]]  (Abnormal) Collected: 03/14/25 1603    Specimen: Blood Updated: 03/14/25 1645     Neutrophil % 33.0 %      Lymphocyte % 57.0 %      Monocyte % 5.0 %      Eosinophil % 3.0 %      Atypical Lymphocyte % 2.0 %      Neutrophils Absolute 1.87 10*3/mm3      Lymphocytes Absolute 3.35 10*3/mm3      Monocytes Absolute 0.28 10*3/mm3      Eosinophils Absolute 0.17 10*3/mm3      RBC Morphology Normal     WBC Morphology Normal     Platelet Morphology Normal    T4, Free [[id number]]  (Abnormal) Collected: 03/14/25 1603    Specimen: Blood Updated: 03/14/25 1715     Free T4 0.77 ng/dL     COVID PRE-OP / PRE-PROCEDURE SCREENING ORDER (NO ISOLATION) - Swab, Nasopharynx [[id number]]  (Normal) Collected: 03/14/25 1618    Specimen: Swab from Nasopharynx Updated: 03/14/25 1643    Narrative:      The following orders were created for  panel order COVID PRE-OP / PRE-PROCEDURE SCREENING ORDER (NO ISOLATION) - Swab, Nasopharynx.  Procedure                               Abnormality         Status                     ---------                               -----------         ------                     COVID-19 and FLU A/B PCR...[[id number]]  Normal              Final result                 Please view results for these tests on the individual orders.    COVID-19 and FLU A/B PCR, 1 HR TAT - Swab, Nasopharynx [[id number]]  (Normal) Collected: 03/14/25 1618    Specimen: Swab from Nasopharynx Updated: 03/14/25 1643     COVID19 Not Detected     Influenza A PCR Not Detected     Influenza B PCR Not Detected    Narrative:      Fact sheet for providers: https://www.fda.gov/media/407885/download    Fact sheet for patients: https://www.fda.gov/media/498341/download    Test performed by PCR.    High Sensitivity Troponin T 1Hr [[id number]]  (Normal) Collected: 03/14/25 1711    Specimen: Blood Updated: 03/14/25 1739     HS Troponin T 10 ng/L      Troponin T Numeric Delta 2 ng/L     Narrative:      High Sensitive Troponin T Reference Range:  <14.0 ng/L- Negative Female for AMI  <22.0 ng/L- Negative Male for AMI  >=14 - Abnormal Female indicating possible myocardial injury.  >=22 - Abnormal Male indicating possible myocardial injury.   Clinicians would have to utilize clinical acumen, EKG, Troponin, and serial changes to determine if it is an Acute Myocardial Infarction or myocardial injury due to an underlying chronic condition.         Urinalysis With Culture If Indicated - Urine, Clean Catch [[id number]]  (Abnormal) Collected: 03/14/25 1720    Specimen: Urine, Clean Catch Updated: 03/14/25 1726     Color, UA Yellow     Appearance, UA Clear     pH, UA 7.5     Specific Gravity, UA 1.015     Glucose,  mg/dL (2+)     Ketones, UA Negative     Bilirubin, UA Negative     Blood, UA Negative     Protein, UA Negative     Leuk Esterase, UA Negative     Nitrite, UA  Negative     Urobilinogen, UA 0.2 E.U./dL    Narrative:      In absence of clinical symptoms, the presence of pyuria, bacteria, and/or nitrites on the urinalysis result does not correlate with infection.  Urine microscopic not indicated.             If labs were ordered, I have independently reviewed the results and considered them in the diagnosis and treatment plan for the patient    RADIOLOGY    XR Chest 1 View  Result Date: 3/14/2025  PROCEDURE: XR CHEST 1 VW-  HISTORY: Chest pain, history of atrial fibrillation, heart rate has been 57 to 114 bpm since Monday, extreme weakness and dizziness and short of breath.  COMPARISON: November 4, 2023..  FINDINGS: The heart is normal in size. The lungs are clear. The mediastinum is unremarkable. There is no pneumothorax.  There are no acute osseous abnormalities. Tortuosity of the thoracic aorta noted. Apical lordotic positioning noted. There is evidence of old calcified granulomatous disease.      Impression: No acute cardiopulmonary process.     This report was signed and finalized on 3/14/2025 4:36 PM by Gloria Vines MD.         If I have ordered, I have independently reviewed the above noted radiographic studies.  Please see the radiologist dictation for the official interpretation    Medications given to patient in the ER    Medications   sodium chloride 0.9 % flush 10 mL (has no administration in time range)   sodium chloride 0.9 % flush 10 mL (has no administration in time range)       AS OF 20:15 EDT VITALS:    BP - 166/100  HR - 61  TEMP - 98.1 °F (36.7 °C) (Oral)  O2 SATS - 91%         Shared Decision Making: After my consideration of the clinical presentation and laboratory/radiology studies obtained, I have discussed the findings with the patient/patient representative who is in agreement with the treatment plan and final disposition. Risks and benefits of discharge and/or observation admission were discussed.  Final disposition of the patient will be  discharged.  Patient is requested to follow-up with primary care provider and specialist in 1 week following final discharge.      Medical Decision Making   Harley Rodriges is a 74 y.o. female who presents to the ER complaining of generalized weak that started Monday.  Patient states she has had episodes where her heart rate has increased into the 1 teens and she has felt weak.  Patient denies any dizziness change in vision headache.  Patient called her cardiologist and told her to come to the emergency room for evaluation.  Patient denies any cough, congestion, shortness of breath..      DDX: includes but is not limited to: NSTEMI, STEMI, chest pain unspecified, atrial fibrillation, generalized weakness,    Problems Addressed:  Abnormal serum thyroid stimulating hormone (TSH) level: complicated acute illness or injury  Generalized weakness: complicated acute illness or injury    Amount and/or Complexity of Data Reviewed  External Data Reviewed: labs, radiology, ECG and notes.     Details: I have personally reviewed labs, radiology EKG and notes from patient's chart  Labs: ordered. Decision-making details documented in ED Course.     Details: I have personally reviewed and documented all results  Radiology: ordered.     Details: I have personally reviewed and documented all results  ECG/medicine tests: ordered.     Details: I have personally reviewed and documented all results  Discussion of management or test interpretation with external provider(s): Discussed assessment, treatment and plan with ER attending    Risk  Prescription drug management.  Risk Details: I have discussed with patient the finding of the test preformed today. Patient has been diagnosed with generalized weakness, abnormal thyroid stimulating hormone and will be discharged home. Advised on return precautions the importance of close follow-up with primary care provider.  Strict return precautions have been given and patient verbalizes  understanding        Final diagnoses:   Generalized weakness   Abnormal serum thyroid stimulating hormone (TSH) level       Please note that portions of this document were completed using voice recognition dictation software.       Manjeet Fernández, [place]  03/14/25 2015

## 2025-03-19 ENCOUNTER — OFFICE VISIT (OUTPATIENT)
Dept: FAMILY MEDICINE CLINIC | Facility: CLINIC | Age: 75
End: 2025-03-19
Payer: MEDICARE

## 2025-03-19 VITALS
HEART RATE: 67 BPM | SYSTOLIC BLOOD PRESSURE: 126 MMHG | OXYGEN SATURATION: 95 % | DIASTOLIC BLOOD PRESSURE: 78 MMHG | RESPIRATION RATE: 18 BRPM | HEIGHT: 64 IN | BODY MASS INDEX: 37.08 KG/M2 | WEIGHT: 217.2 LBS

## 2025-03-19 DIAGNOSIS — E03.9 HYPOTHYROIDISM, UNSPECIFIED TYPE: Primary | ICD-10-CM

## 2025-03-19 DIAGNOSIS — M54.50 CHRONIC BILATERAL LOW BACK PAIN WITHOUT SCIATICA: ICD-10-CM

## 2025-03-19 DIAGNOSIS — M79.7 FIBROMYALGIA: ICD-10-CM

## 2025-03-19 DIAGNOSIS — J30.9 ALLERGIC RHINITIS, UNSPECIFIED SEASONALITY, UNSPECIFIED TRIGGER: ICD-10-CM

## 2025-03-19 DIAGNOSIS — G89.29 CHRONIC BILATERAL LOW BACK PAIN WITHOUT SCIATICA: ICD-10-CM

## 2025-03-19 DIAGNOSIS — B37.2 SKIN CANDIDIASIS: ICD-10-CM

## 2025-03-19 RX ORDER — NYSTATIN 100000 U/G
1 CREAM TOPICAL 2 TIMES DAILY
Qty: 30 G | Refills: 1 | Status: SHIPPED | OUTPATIENT
Start: 2025-03-19

## 2025-03-19 RX ORDER — LORATADINE 10 MG/1
10 TABLET ORAL DAILY
Qty: 90 TABLET | Refills: 1 | Status: SHIPPED | OUTPATIENT
Start: 2025-03-19

## 2025-03-19 RX ORDER — NYSTATIN 100000 [USP'U]/G
POWDER TOPICAL 2 TIMES DAILY
Qty: 60 G | Refills: 1 | Status: SHIPPED | OUTPATIENT
Start: 2025-03-19

## 2025-03-19 RX ORDER — TRAMADOL HYDROCHLORIDE 50 MG/1
50 TABLET ORAL EVERY 8 HOURS PRN
Qty: 45 TABLET | Refills: 1 | Status: SHIPPED | OUTPATIENT
Start: 2025-03-19

## 2025-03-19 NOTE — PROGRESS NOTES
Subjective     Chief Complaint:    Chief Complaint   Patient presents with    Hospital Follow Up Visit     Afib       History of Present Illness:   ED f/u, afib, tachycardia, not new, follows with dr Waldrop, resolved by the time she got to ED, ED concerned her hyperthyroidism was contributing and recommend endo referral. TSH was decreased and thyroid supplement was decreased early this month   Stated cardiology also wanted her off celebrex, she has chronic arthrtitis pain and fibro       Review of Systems  Gen- No fevers, chills  CV- No chest pain, palpitations  Resp- No cough, dyspnea  GI- No N/V/D, abd pain  Neuro-No dizziness, headaches      I have reviewed and/or updated the patient's past medical, surgical, family, social history and problem list as appropriate.     Medications:    Current Outpatient Medications:     acetaminophen (TYLENOL) 500 MG tablet, Take 1 tablet by mouth Every 6 (Six) Hours As Needed for Mild Pain. Indications: Pain, Disp: , Rfl:     apixaban (ELIQUIS) 5 MG tablet tablet, Take 1 tablet by mouth Every 12 (Twelve) Hours. Indications: Atrial Fibrillation, Disp: 60 tablet, Rfl: 0    celecoxib (CeleBREX) 200 MG capsule, Take 1 capsule by mouth Daily., Disp: 90 capsule, Rfl: 1    dicyclomine (BENTYL) 20 MG tablet, Take 1 tablet by mouth 3 (Three) Times a Day As Needed (lower abdominal pain and diarrhea). (Patient taking differently: Take 1 tablet by mouth As Needed (lower abdominal pain and diarrhea).), Disp: 30 tablet, Rfl: 1    DULoxetine (CYMBALTA) 60 MG capsule, Take 1 capsule by mouth 2 (Two) Times a Day. Indications: Peripheral Nerve Disease, Disp: 180 capsule, Rfl: 1    empagliflozin (Jardiance) 10 MG tablet tablet, Take 1 tablet by mouth Daily., Disp: 30 tablet, Rfl: 2    estradiol (ESTRACE) 0.5 MG tablet, Take 1 tablet by mouth Daily. Indications: Hair Loss, Disp: 90 tablet, Rfl: 3    loratadine (CLARITIN) 10 MG tablet, Take 1 tablet by mouth Daily. Indications: Hayfever, Disp:  "90 tablet, Rfl: 1    meclizine (ANTIVERT) 25 MG tablet, Take 1 tablet by mouth 3 (Three) Times a Day As Needed for Dizziness., Disp: , Rfl:     metoprolol succinate XL (TOPROL-XL) 25 MG 24 hr tablet, Take 1 tablet by mouth Daily., Disp: 90 tablet, Rfl: 3    Multiple Vitamins-Minerals (ONE-A-DAY WOMENS 50+ PO), Take  by mouth. Indications: Supplement, Disp: , Rfl:     omeprazole (priLOSEC) 40 MG capsule, TAKE 1 CAPSULE BY MOUTH ONCE EVERY MORNING 30 MINUTES PRIOR TO BREAKFAST, Disp: 90 capsule, Rfl: 1    pregabalin (Lyrica) 100 MG capsule, Take 1 capsule by mouth 2 (Two) Times a Day., Disp: 60 capsule, Rfl: 2    rosuvastatin (CRESTOR) 10 MG tablet, , Disp: , Rfl:     Thyroid 30 MG PO tablet, Take 1 tablet by mouth Daily. Indications: Underactive Thyroid, Disp: 30 tablet, Rfl: 2    traZODone (DESYREL) 100 MG tablet, Take 0.5 tablets by mouth every night at bedtime. Indications: Anxiety Disorder, Trouble Sleeping, Disp: , Rfl:     Unable to find, 1 each 1 (One) Time. Berberine, Disp: , Rfl:     vitamin D3 125 MCG (5000 UT) capsule capsule, Take 1 capsule by mouth Daily. Indications: Supplement, Disp: , Rfl:     nystatin (MYCOSTATIN) 209373 UNIT/GM cream, Apply 1 Application topically to the appropriate area as directed 2 (Two) Times a Day., Disp: 30 g, Rfl: 1    nystatin (MYCOSTATIN) 980401 UNIT/GM powder, Apply  topically to the appropriate area as directed 2 (Two) Times a Day., Disp: 60 g, Rfl: 1    traMADol (Ultram) 50 MG tablet, Take 1 tablet by mouth Every 8 (Eight) Hours As Needed for Moderate Pain., Disp: 45 tablet, Rfl: 1    Allergies:  No Known Allergies    Objective     Vital Signs:   Vitals:    03/19/25 1046   BP: 126/78   Pulse: 67   Resp: 18   SpO2: 95%   Weight: 98.5 kg (217 lb 3.2 oz)   Height: 162 cm (63.78\")     Body mass index is 37.54 kg/m².    Physical Exam:    Physical Exam  Vitals and nursing note reviewed.   Constitutional:       Appearance: She is well-developed.   HENT:      Head: Normocephalic " and atraumatic.   Eyes:      Pupils: Pupils are equal, round, and reactive to light.   Cardiovascular:      Rate and Rhythm: Normal rate and regular rhythm.      Heart sounds: Normal heart sounds.   Pulmonary:      Effort: Pulmonary effort is normal.      Breath sounds: Normal breath sounds.   Abdominal:      General: Bowel sounds are normal. There is no distension.      Palpations: Abdomen is soft.      Tenderness: There is no abdominal tenderness.   Musculoskeletal:      Cervical back: Neck supple.   Skin:     General: Skin is warm and dry.   Neurological:      General: No focal deficit present.      Mental Status: She is alert and oriented to person, place, and time.   Psychiatric:         Mood and Affect: Mood normal.         Behavior: Behavior normal.         Assessment / Plan     Assessment/Plan:   Problem List Items Addressed This Visit       Hypothyroidism - Primary    Relevant Medications    metoprolol succinate XL (TOPROL-XL) 25 MG 24 hr tablet    Thyroid 30 MG PO tablet    Other Relevant Orders    Ambulatory Referral to Endocrinology    Thyroid Peroxidase Antibody    Thyroglobulin    TSH    T4, Free    Fibromyalgia    Relevant Medications    pregabalin (Lyrica) 100 MG capsule    Chronic bilateral low back pain without sciatica    Relevant Medications    pregabalin (Lyrica) 100 MG capsule    traMADol (Ultram) 50 MG tablet     Other Visit Diagnoses         Allergic rhinitis, unspecified seasonality, unspecified trigger        Relevant Medications    loratadine (CLARITIN) 10 MG tablet      Skin candidiasis        Relevant Medications    nystatin (MYCOSTATIN) 577580 UNIT/GM cream    nystatin (MYCOSTATIN) 998658 UNIT/GM powder          -- endo referral, her T4 is low as well as TSH. I will reach out to endo as well  -- trial tramadol     Discussed plan of care in detail with pt today; pt verb understanding and agrees.    Follow up:  3-4 weeks     Electronically signed by AUSTIN Hickey   03/19/2025  11:22 EDT      Please note that portions of this note were completed with a voice recognition program.

## 2025-03-21 LAB
T3FREE SERPL-MCNC: 3.1 PG/ML (ref 2–4.4)
T4 FREE SERPL-MCNC: 0.61 NG/DL (ref 0.92–1.68)
TSH SERPL DL<=0.005 MIU/L-ACNC: 0.12 UIU/ML (ref 0.27–4.2)

## 2025-03-31 LAB
THYROGLOB SERPL-MCNC: 19 NG/ML
THYROPEROXIDASE AB SERPL-ACNC: 11 IU/ML (ref 0–34)

## 2025-04-03 ENCOUNTER — OFFICE VISIT (OUTPATIENT)
Dept: PULMONOLOGY | Facility: CLINIC | Age: 75
End: 2025-04-03
Payer: MEDICARE

## 2025-04-03 VITALS
HEART RATE: 72 BPM | RESPIRATION RATE: 16 BRPM | WEIGHT: 217 LBS | HEIGHT: 64 IN | SYSTOLIC BLOOD PRESSURE: 116 MMHG | DIASTOLIC BLOOD PRESSURE: 72 MMHG | OXYGEN SATURATION: 96 % | BODY MASS INDEX: 37.05 KG/M2

## 2025-04-03 DIAGNOSIS — R53.83 OTHER FATIGUE: ICD-10-CM

## 2025-04-03 DIAGNOSIS — G47.33 OBSTRUCTIVE SLEEP APNEA: Primary | ICD-10-CM

## 2025-04-03 DIAGNOSIS — E66.9 OBESITY (BMI 30-39.9): ICD-10-CM

## 2025-04-03 PROCEDURE — 99214 OFFICE O/P EST MOD 30 MIN: CPT | Performed by: INTERNAL MEDICINE

## 2025-04-03 RX ORDER — METOPROLOL TARTRATE 25 MG/1
25 TABLET, FILM COATED ORAL 2 TIMES DAILY PRN
COMMUNITY
Start: 2025-03-14 | End: 2025-04-03

## 2025-04-03 RX ORDER — ATORVASTATIN CALCIUM 80 MG/1
80 TABLET, FILM COATED ORAL DAILY
COMMUNITY

## 2025-04-03 NOTE — PROGRESS NOTES
"  Chief Complaint   Patient presents with    Sleeping Problem    Follow-up       Subjective   Harley Rodriges is a 74 y.o. female.   Patient was evaluated today for follow up of Sleep apnea.     Patient doesn't report any issues with the PAP device. The patient describes no significant issues with her mask either.     Patient says that the compliance with the use of the equipment is good.     Patient says that her symptoms of fatigue & daytime sleepiness have been helped with the use of PAP, she is still having issues with fatigue and some days she can barely do anything at all.      The following portions of the patient's history were reviewed and updated as appropriate: allergies, current medications, past family history, past medical history, past social history, and past surgical history.    Review of Systems   HENT:  Negative for sinus pressure, sneezing and sore throat.    Respiratory:  Positive for chest tightness. Negative for cough, shortness of breath and wheezing.        Objective   Visit Vitals  /72   Pulse 72   Resp 16   Ht 162 cm (63.78\") Comment: pt reported   Wt 98.4 kg (217 lb)   SpO2 96%   BMI 37.51 kg/m²       BMI Readings from Last 8 Encounters:   04/03/25 37.51 kg/m²   03/19/25 37.54 kg/m²   03/14/25 38.10 kg/m²   02/25/25 37.96 kg/m²   01/02/25 37.42 kg/m²   12/26/24 37.62 kg/m²   11/25/24 37.57 kg/m²   11/14/24 37.61 kg/m²       Physical Exam  Vitals reviewed.   Constitutional:       Appearance: She is well-developed.   HENT:      Head: Atraumatic.      Mouth/Throat:      Comments: Oropharynx was crowded.  Cardiovascular:      Rate and Rhythm: Normal rate and regular rhythm.      Heart sounds: Normal heart sounds. No murmur heard.  Pulmonary:      Effort: Pulmonary effort is normal. No respiratory distress.      Breath sounds: Normal breath sounds. No wheezing or rales.   Neurological:      Mental Status: She is alert and oriented to person, place, and time.         Assessment & Plan "   Diagnoses and all orders for this visit:    1. Obstructive sleep apnea (Primary)  -     BIPAP / CPAP Adjustment    2. Obesity (BMI 30-39.9)  -     BIPAP / CPAP Adjustment    3. Other fatigue           Return in about 17 weeks (around 7/31/2025) for Recheck.    DISCUSSION (if any):  Last chest x-ray was reviewed personally and the results were shared with the patient.  Images reviewed personally.   Results for orders placed during the hospital encounter of 03/14/25    XR Chest 1 View    Narrative  PROCEDURE: XR CHEST 1 VW-    HISTORY: Chest pain, history of atrial fibrillation, heart rate has been  57 to 114 bpm since Monday, extreme weakness and dizziness and short of  breath.    COMPARISON: November 4, 2023..    FINDINGS: The heart is normal in size. The lungs are clear. The  mediastinum is unremarkable. There is no pneumothorax.  There are no  acute osseous abnormalities. Tortuosity of the thoracic aorta noted.  Apical lordotic positioning noted. There is evidence of old calcified  granulomatous disease.    Impression  No acute cardiopulmonary process.          This report was signed and finalized on 3/14/2025 4:36 PM by Gloria Vines MD.    I also reviewed her last echocardiogram and shared the results with her.   Results for orders placed during the hospital encounter of 11/04/23    Adult Transthoracic Echo Complete W/ Cont if Necessary Per Protocol (With Agitated Saline)    Interpretation Summary    Left ventricular ejection fraction appears to be 56 - 60%.    Left ventricular diastolic function is consistent with (grade I) impaired relaxation.    The left atrial cavity is dilated.    Saline test results are negative for right to left atrial level shunt.    There is calcification of the aortic valve.  No stenosis.    Estimated right ventricular systolic pressure from tricuspid regurgitation is normal (<35 mmHg).      Laboratory workup also showed   Lab Results   Component Value Date    HGB 10.9 (L) 03/14/2025     HGB 11.0 (L) 12/26/2024    HGB 10.7 (L) 11/05/2023   ,   Lab Results   Component Value Date    HCT 33.1 (L) 03/14/2025    HCT 33.6 (L) 12/26/2024    HCT 32.9 (L) 11/05/2023       Lab Results   Component Value Date    EOSABS 0.12 03/14/2025    EOSABS 0.17 03/14/2025    EOSABS 0.18 12/26/2024    & Laboratory workup also showed   Lab Results   Component Value Date    CO2 27.2 03/14/2025   ,   Lab Results   Component Value Date    HGBA1C 7.80 (H) 03/06/2025    HGBA1C 7.6 (A) 10/09/2024    HGBA1C 6.70 (H) 11/05/2023   ,   Lab Results   Component Value Date    TSH 0.118 (L) 03/20/2025    TSH 0.073 (L) 03/14/2025    TSH 0.073 (L) 03/14/2025     ===========================  ===========================    Sleep study performed in Dec 2024  AHI was 37 / hour.   Supine AHI was 46/hour.     Latest PAP device provided in late Jan 2025  DME company: [employer]    Current PAP settings: 6-20  Current mask type: FFM    I told the patient that her symptoms are consistent with partially controlled sleep apnea.    I have decided to empirically change her to AutoPAP at a pressure of 8-16.    If the symptoms do not improve, even after undertaking the above measures, the patient may have to undergo a titration study.    Patient was advised to continue using PAP for at least 4 hours every night.    Patient was advised to call this office with any issues.    I had a long discussion with the patient regarding ongoing issues with fatigue.    I told the patient that she has multiple medications that could have a side effect of fatigue and tiredness.    I suggested that the patient tries to cut trazodone in half for the next 10 days and since she will be following up with her primary care provider around that time, further de-escalation can be discussed with the primary care provider.    Another possibility would be poorly controlled atrial fibrillation/worsening rapid ventricular rate with exertion which may cause fatigue later on?    If this  seems to be clinically plausible, she may benefit from revisiting this with her primary care provider/cardiologist      Dictated utilizing Dragon dictation.    This document was electronically signed by Mayte Christie MD on 04/03/25 at 14:44 EDT

## 2025-04-04 DIAGNOSIS — G89.29 CHRONIC BILATERAL LOW BACK PAIN WITHOUT SCIATICA: ICD-10-CM

## 2025-04-04 DIAGNOSIS — G62.9 PERIPHERAL POLYNEUROPATHY: ICD-10-CM

## 2025-04-04 DIAGNOSIS — M54.50 CHRONIC BILATERAL LOW BACK PAIN WITHOUT SCIATICA: ICD-10-CM

## 2025-04-04 DIAGNOSIS — M79.7 FIBROMYALGIA: ICD-10-CM

## 2025-04-04 RX ORDER — TRAMADOL HYDROCHLORIDE 50 MG/1
50 TABLET ORAL EVERY 8 HOURS PRN
Qty: 45 TABLET | Refills: 1 | Status: SHIPPED | OUTPATIENT
Start: 2025-04-04

## 2025-04-04 RX ORDER — PREGABALIN 100 MG/1
100 CAPSULE ORAL 2 TIMES DAILY
Qty: 60 CAPSULE | Refills: 2 | Status: SHIPPED | OUTPATIENT
Start: 2025-04-04

## 2025-04-04 NOTE — TELEPHONE ENCOUNTER
Rx Refill Note  Requested Prescriptions     Pending Prescriptions Disp Refills    traMADol (Ultram) 50 MG tablet 45 tablet 1     Sig: Take 1 tablet by mouth Every 8 (Eight) Hours As Needed for Moderate Pain.    pregabalin (Lyrica) 100 MG capsule 60 capsule 2     Sig: Take 1 capsule by mouth 2 (Two) Times a Day.      Last office visit with prescribing clinician: 3/19/2025   Last telemedicine visit with prescribing clinician: Visit date not found   Next office visit with prescribing clinician: 4/14/2025                         Would you like a call back once the refill request has been completed: [] Yes [] No    If the office needs to give you a call back, can they leave a voicemail: [] Yes [] No    Carmelina Rondon MA  04/04/25, 17:04 EDT

## 2025-04-14 ENCOUNTER — PRIOR AUTHORIZATION (OUTPATIENT)
Dept: FAMILY MEDICINE CLINIC | Facility: CLINIC | Age: 75
End: 2025-04-14
Payer: MEDICARE

## 2025-04-14 ENCOUNTER — TELEPHONE (OUTPATIENT)
Dept: FAMILY MEDICINE CLINIC | Facility: CLINIC | Age: 75
End: 2025-04-14

## 2025-04-14 ENCOUNTER — OFFICE VISIT (OUTPATIENT)
Dept: FAMILY MEDICINE CLINIC | Facility: CLINIC | Age: 75
End: 2025-04-14
Payer: MEDICARE

## 2025-04-14 VITALS
RESPIRATION RATE: 18 BRPM | OXYGEN SATURATION: 98 % | SYSTOLIC BLOOD PRESSURE: 114 MMHG | WEIGHT: 214.4 LBS | HEART RATE: 88 BPM | DIASTOLIC BLOOD PRESSURE: 78 MMHG | BODY MASS INDEX: 36.6 KG/M2 | HEIGHT: 64 IN
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
## 2025-04-14 DIAGNOSIS — E55.9 VITAMIN D DEFICIENCY: ICD-10-CM

## 2025-04-14 DIAGNOSIS — R79.9 ABNORMAL FINDING OF BLOOD CHEMISTRY, UNSPECIFIED: ICD-10-CM

## 2025-04-14 DIAGNOSIS — E11.40 TYPE 2 DIABETES MELLITUS WITH DIABETIC NEUROPATHY, WITHOUT LONG-TERM CURRENT USE OF INSULIN: ICD-10-CM

## 2025-04-14 DIAGNOSIS — Z74.09 IMPAIRED MOBILITY AND ADLS: ICD-10-CM

## 2025-04-14 DIAGNOSIS — M54.50 CHRONIC BILATERAL LOW BACK PAIN WITHOUT SCIATICA: ICD-10-CM

## 2025-04-14 DIAGNOSIS — G89.29 CHRONIC BILATERAL LOW BACK PAIN WITHOUT SCIATICA: ICD-10-CM

## 2025-04-14 DIAGNOSIS — Z78.9 IMPAIRED MOBILITY AND ADLS: ICD-10-CM

## 2025-04-14 DIAGNOSIS — R53.83 FATIGUE, UNSPECIFIED TYPE: Primary | ICD-10-CM

## 2025-04-14 PROCEDURE — 99214 OFFICE O/P EST MOD 30 MIN: CPT | Performed by: NURSE PRACTITIONER

## 2025-04-14 PROCEDURE — G2211 COMPLEX E/M VISIT ADD ON: HCPCS | Performed by: NURSE PRACTITIONER

## 2025-04-14 PROCEDURE — 1159F MED LIST DOCD IN RCRD: CPT | Performed by: NURSE PRACTITIONER

## 2025-04-14 PROCEDURE — 1160F RVW MEDS BY RX/DR IN RCRD: CPT | Performed by: NURSE PRACTITIONER

## 2025-04-14 PROCEDURE — 1125F AMNT PAIN NOTED PAIN PRSNT: CPT | Performed by: NURSE PRACTITIONER

## 2025-04-14 RX ORDER — TRAMADOL HYDROCHLORIDE 50 MG/1
50-100 TABLET ORAL EVERY 8 HOURS PRN
Qty: 90 TABLET | Refills: 1 | Status: SHIPPED | OUTPATIENT
Start: 2025-04-14

## 2025-04-14 NOTE — PROGRESS NOTES
Subjective     Chief Complaint:    Chief Complaint   Patient presents with   • Hypothyroidism       History of Present Illness:   F/u on hypotyroidism, TSH suppressed but t3 normal, I have spoken with endo about her case  Still with trouble sleeping, fatigue during the day, still with continued pain, does have fibro, leg and back pain as well, using tens unit, still feels like she cannot function very well, not doing much during the day, not walking her dog, not cooking much, has people helping her, napping during the day as well   Mechanical fall 2 weeks ago       Review of Systems  Gen- No fevers, chills  CV- No chest pain, palpitations  Resp- No cough, dyspnea  GI- No N/V/D, abd pain  Neuro-No dizziness, headaches      I have reviewed and/or updated the patient's past medical, surgical, family, social history and problem list as appropriate.     Medications:    Current Outpatient Medications:   •  acetaminophen (TYLENOL) 500 MG tablet, Take 1 tablet by mouth Every 6 (Six) Hours As Needed for Mild Pain. Indications: Pain, Disp: , Rfl:   •  apixaban (ELIQUIS) 5 MG tablet tablet, Take 1 tablet by mouth Every 12 (Twelve) Hours. Indications: Atrial Fibrillation, Disp: 60 tablet, Rfl: 0  •  atorvastatin (LIPITOR) 80 MG tablet, Take 1 tablet by mouth Daily., Disp: , Rfl:   •  dicyclomine (BENTYL) 20 MG tablet, Take 1 tablet by mouth 3 (Three) Times a Day As Needed (lower abdominal pain and diarrhea). (Patient taking differently: Take 1 tablet by mouth As Needed (lower abdominal pain and diarrhea).), Disp: 30 tablet, Rfl: 1  •  DULoxetine (CYMBALTA) 60 MG capsule, Take 1 capsule by mouth 2 (Two) Times a Day. Indications: Peripheral Nerve Disease, Disp: 180 capsule, Rfl: 1  •  empagliflozin (Jardiance) 10 MG tablet tablet, Take 1 tablet by mouth Daily., Disp: 30 tablet, Rfl: 2  •  estradiol (ESTRACE) 0.5 MG tablet, Take 1 tablet by mouth Daily. Indications: Hair Loss, Disp: 90 tablet, Rfl: 3  •  loratadine  "(CLARITIN) 10 MG tablet, Take 1 tablet by mouth Daily. Indications: Hayfever, Disp: 90 tablet, Rfl: 1  •  meclizine (ANTIVERT) 25 MG tablet, Take 1 tablet by mouth 3 (Three) Times a Day As Needed for Dizziness., Disp: , Rfl:   •  metoprolol succinate XL (TOPROL-XL) 25 MG 24 hr tablet, Take 1 tablet by mouth Daily., Disp: 90 tablet, Rfl: 3  •  Multiple Vitamins-Minerals (ONE-A-DAY WOMENS 50+ PO), Take  by mouth. Indications: Supplement, Disp: , Rfl:   •  nystatin (MYCOSTATIN) 190245 UNIT/GM cream, Apply 1 Application topically to the appropriate area as directed 2 (Two) Times a Day., Disp: 30 g, Rfl: 1  •  nystatin (MYCOSTATIN) 938830 UNIT/GM powder, Apply  topically to the appropriate area as directed 2 (Two) Times a Day., Disp: 60 g, Rfl: 1  •  omeprazole (priLOSEC) 40 MG capsule, TAKE 1 CAPSULE BY MOUTH ONCE EVERY MORNING 30 MINUTES PRIOR TO BREAKFAST, Disp: 90 capsule, Rfl: 1  •  pregabalin (Lyrica) 100 MG capsule, Take 1 capsule by mouth 2 (Two) Times a Day., Disp: 60 capsule, Rfl: 2  •  rosuvastatin (CRESTOR) 10 MG tablet, , Disp: , Rfl:   •  Thyroid 30 MG PO tablet, Take 1 tablet by mouth Daily. Indications: Underactive Thyroid, Disp: 30 tablet, Rfl: 2  •  traMADol (Ultram) 50 MG tablet, Take 1 tablet by mouth Every 8 (Eight) Hours As Needed for Moderate Pain., Disp: 45 tablet, Rfl: 1  •  traZODone (DESYREL) 100 MG tablet, Take 0.5 tablets by mouth every night at bedtime. Indications: Anxiety Disorder, Trouble Sleeping, Disp: , Rfl:   •  vitamin D3 125 MCG (5000 UT) capsule capsule, Take 1 capsule by mouth Daily. Indications: Supplement, Disp: , Rfl:     Allergies:  No Known Allergies    Objective     Vital Signs:   Vitals:    04/14/25 0915   BP: 114/78   Pulse: 88   Resp: 18   SpO2: 98%   Weight: 97.3 kg (214 lb 6.4 oz)   Height: 162 cm (63.78\")     Body mass index is 37.06 kg/m².    Physical Exam:    Physical Exam  Vitals and nursing note reviewed.   Constitutional:       Appearance: She is well-developed. "   HENT:      Head: Normocephalic and atraumatic.   Eyes:      Pupils: Pupils are equal, round, and reactive to light.   Cardiovascular:      Rate and Rhythm: Normal rate and regular rhythm.      Heart sounds: Normal heart sounds.   Pulmonary:      Effort: Pulmonary effort is normal.      Breath sounds: Normal breath sounds.   Abdominal:      General: Bowel sounds are normal. There is no distension.      Palpations: Abdomen is soft.      Tenderness: There is no abdominal tenderness.   Musculoskeletal:      Cervical back: Neck supple.   Skin:     General: Skin is warm and dry.   Neurological:      General: No focal deficit present.      Mental Status: She is alert and oriented to person, place, and time.      Motor: Weakness present.      Gait: Gait abnormal.   Psychiatric:         Mood and Affect: Mood normal.         Behavior: Behavior normal.         Assessment / Plan     Assessment/Plan:   Problem List Items Addressed This Visit    None  Visit Diagnoses         Fatigue, unspecified type    -  Primary    Relevant Orders    Comprehensive Metabolic Panel    CBC Auto Differential    Iron Profile    Ferritin    Vitamin B12    Vitamin D,25-Hydroxy    Folate    TSH    T3, Free      Abnormal finding of blood chemistry, unspecified        Relevant Orders    Iron Profile    Ferritin      Vitamin D deficiency        Relevant Orders    Vitamin D,25-Hydroxy      Impaired mobility and ADLs        Relevant Orders    Ambulatory Referral to Home Health      Fall, initial encounter        Relevant Orders    Ambulatory Referral to Home Health      Type 2 diabetes mellitus with diabetic neuropathy, without long-term current use of insulin        Relevant Orders    Ambulatory Referral to Home Health          -- check labs, order home health     Discussed plan of care in detail with pt today; pt verb understanding and agrees.    Follow up:  6 weeks     Electronically signed by AUSTIN Hickey   04/14/2025 09:30 EDT      Please  note that portions of this note were completed with a voice recognition program.

## 2025-04-14 NOTE — TELEPHONE ENCOUNTER
Caller: Harley Rodriges    Relationship: Self    Best call back number: [phone]     Which medication are you concerned about: traMADol (Ultram) 50 MG tablet     Who prescribed you this medication: AUSTIN EATON     When did you start taking this medication: ONE MONTH     What are your concerns: THE PATIENT REPORTS THAT AT HER APPOINTMENT WITH AUSTIN EATON, TODAY, 04/14/2025, THAT SHE ADVISED THAT THE TRAMADOL IS NOT HELPING AT THE CURRENT DOSAGE AND/OR FREQUENCY WITH HER PAIN AND IS REQUESTING THE TRAMADOL TO BE INCREASED.  THE PATIENT STATED SHE LEFT THE APPOINTMENT NOT KNOWING IF THIS MEDICATION WOULD BE INCREASED.  PLEASE CALL THE PATIENT AND ADVISE.     How long have you had these concerns: TWO WEEKS.    PLEASE CALL AND ADVISE.

## 2025-04-14 NOTE — TELEPHONE ENCOUNTER
She can take 1-2 tablets three times a day as needed. If she having increase pain take 2, if pain is tolerable then take 1,

## 2025-04-14 NOTE — TELEPHONE ENCOUNTER
Prior Authorization has been started on Cover My Med[employer] for Thyroid 30 MG tablets    Waiting on response from insurance       Key:[id number]

## 2025-04-15 LAB
25(OH)D3+25(OH)D2 SERPL-MCNC: 56.5 NG/ML (ref 30–100)
ALBUMIN SERPL-MCNC: 4.3 G/DL (ref 3.5–5.2)
ALBUMIN/GLOB SERPL: 1.4 G/DL
ALP SERPL-CCNC: 58 U/L (ref 39–117)
ALT SERPL-CCNC: 13 U/L (ref 1–33)
AST SERPL-CCNC: 21 U/L (ref 1–32)
BASOPHILS # BLD AUTO: 0.02 10*3/MM3 (ref 0–0.2)
BASOPHILS NFR BLD AUTO: 0.4 % (ref 0–1.5)
BILIRUB SERPL-MCNC: 0.2 MG/DL (ref 0–1.2)
BUN SERPL-MCNC: 22 MG/DL (ref 8–23)
BUN/CREAT SERPL: 18.2 (ref 7–25)
CALCIUM SERPL-MCNC: 9.8 MG/DL (ref 8.6–10.5)
CHLORIDE SERPL-SCNC: 100 MMOL/L (ref 98–107)
CO2 SERPL-SCNC: 25.6 MMOL/L (ref 22–29)
CREAT SERPL-MCNC: 1.21 MG/DL (ref 0.57–1)
EGFRCR SERPLBLD CKD-EPI 2021: 46.5 ML/MIN/1.73
EOSINOPHIL # BLD AUTO: 0.11 10*3/MM3 (ref 0–0.4)
EOSINOPHIL NFR BLD AUTO: 2.4 % (ref 0.3–6.2)
ERYTHROCYTE [DISTWIDTH] IN BLOOD BY AUTOMATED COUNT: 12.9 % (ref 12.3–15.4)
FERRITIN SERPL-MCNC: 262 NG/ML (ref 13–150)
FOLATE SERPL-MCNC: 19.3 NG/ML (ref 4.78–24.2)
GLOBULIN SER CALC-MCNC: 3 GM/DL
GLUCOSE SERPL-MCNC: 174 MG/DL (ref 65–99)
HCT VFR BLD AUTO: 36.8 % (ref 34–46.6)
HGB BLD-MCNC: 12.4 G/DL (ref 12–15.9)
IMM GRANULOCYTES # BLD AUTO: 0.02 10*3/MM3 (ref 0–0.05)
IMM GRANULOCYTES NFR BLD AUTO: 0.4 % (ref 0–0.5)
IRON SATN MFR SERPL: 16 % (ref 20–50)
IRON SERPL-MCNC: 70 MCG/DL (ref 37–145)
LYMPHOCYTES # BLD AUTO: 2.38 10*3/MM3 (ref 0.7–3.1)
LYMPHOCYTES NFR BLD AUTO: 52.7 % (ref 19.6–45.3)
MCH RBC QN AUTO: 30.4 PG (ref 26.6–33)
MCHC RBC AUTO-ENTMCNC: 33.7 G/DL (ref 31.5–35.7)
MCV RBC AUTO: 90.2 FL (ref 79–97)
MONOCYTES # BLD AUTO: 0.36 10*3/MM3 (ref 0.1–0.9)
MONOCYTES NFR BLD AUTO: 8 % (ref 5–12)
NEUTROPHILS # BLD AUTO: 1.63 10*3/MM3 (ref 1.7–7)
NEUTROPHILS NFR BLD AUTO: 36.1 % (ref 42.7–76)
NRBC BLD AUTO-RTO: 0 /100 WBC (ref 0–0.2)
PLATELET # BLD AUTO: 260 10*3/MM3 (ref 140–450)
POTASSIUM SERPL-SCNC: 4.6 MMOL/L (ref 3.5–5.2)
PROT SERPL-MCNC: 7.3 G/DL (ref 6–8.5)
RBC # BLD AUTO: 4.08 10*6/MM3 (ref 3.77–5.28)
SODIUM SERPL-SCNC: 136 MMOL/L (ref 136–145)
T3FREE SERPL-MCNC: 3.3 PG/ML (ref 2–4.4)
TIBC SERPL-MCNC: 446 MCG/DL
TSH SERPL DL<=0.005 MIU/L-ACNC: 1.61 UIU/ML (ref 0.27–4.2)
UIBC SERPL-MCNC: 376 MCG/DL (ref 112–346)
VIT B12 SERPL-MCNC: 452 PG/ML (ref 211–946)
WBC # BLD AUTO: 4.52 10*3/MM3 (ref 3.4–10.8)

## 2025-04-25 ENCOUNTER — EXTERNAL PBMM DATA (OUTPATIENT)
Dept: PHARMACY | Facility: OTHER | Age: 75
End: 2025-04-25
Payer: MEDICARE

## 2025-04-28 ENCOUNTER — TELEPHONE (OUTPATIENT)
Dept: FAMILY MEDICINE CLINIC | Facility: CLINIC | Age: 75
End: 2025-04-28
Payer: MEDICARE

## 2025-04-28 NOTE — TELEPHONE ENCOUNTER
[facility] pharmacy needs an order for her diabetic testing kit for insurance. Please advise.

## 2025-05-02 ENCOUNTER — APPOINTMENT (OUTPATIENT)
Dept: CT IMAGING | Facility: HOSPITAL | Age: 75
End: 2025-05-02
Payer: MEDICARE

## 2025-05-02 ENCOUNTER — HOSPITAL ENCOUNTER (EMERGENCY)
Facility: HOSPITAL | Age: 75
Discharge: HOME | End: 2025-05-02
Attending: STUDENT IN AN ORGANIZED HEALTH CARE EDUCATION/TRAINING PROGRAM
Payer: MEDICARE

## 2025-05-02 ENCOUNTER — APPOINTMENT (OUTPATIENT)
Dept: GENERAL RADIOLOGY | Facility: HOSPITAL | Age: 75
End: 2025-05-02
Payer: MEDICARE

## 2025-05-02 VITALS
DIASTOLIC BLOOD PRESSURE: 94 MMHG | SYSTOLIC BLOOD PRESSURE: 156 MMHG | OXYGEN SATURATION: 95 % | WEIGHT: 210 LBS | HEIGHT: 64 IN | RESPIRATION RATE: 18 BRPM | TEMPERATURE: 97.5 F | BODY MASS INDEX: 35.85 KG/M2 | HEART RATE: 53 BPM
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
## 2025-05-02 DIAGNOSIS — E11.40 TYPE 2 DIABETES MELLITUS WITH DIABETIC NEUROPATHY, WITHOUT LONG-TERM CURRENT USE OF INSULIN: Primary | ICD-10-CM

## 2025-05-02 DIAGNOSIS — S80.01XA CONTUSION OF RIGHT KNEE, INITIAL ENCOUNTER: ICD-10-CM

## 2025-05-02 DIAGNOSIS — R55 SYNCOPE, UNSPECIFIED SYNCOPE TYPE: Primary | ICD-10-CM

## 2025-05-02 LAB
ALBUMIN SERPL-MCNC: 4.4 G/DL (ref 3.5–5.2)
ALBUMIN/GLOB SERPL: 1.4 G/DL
ALP SERPL-CCNC: 54 U/L (ref 39–117)
ALT SERPL W P-5'-P-CCNC: 14 U/L (ref 1–33)
ANION GAP SERPL CALCULATED.3IONS-SCNC: 11 MMOL/L (ref 5–15)
AST SERPL-CCNC: 21 U/L (ref 1–32)
BILIRUB SERPL-MCNC: 0.3 MG/DL (ref 0–1.2)
BUN SERPL-MCNC: 17 MG/DL (ref 8–23)
BUN/CREAT SERPL: 13.9 (ref 7–25)
CALCIUM SPEC-SCNC: 10.2 MG/DL (ref 8.6–10.5)
CHLORIDE SERPL-SCNC: 98 MMOL/L (ref 98–107)
CO2 SERPL-SCNC: 26 MMOL/L (ref 22–29)
CREAT SERPL-MCNC: 1.22 MG/DL (ref 0.57–1)
D DIMER PPP FEU-MCNC: <0.27 MCGFEU/ML (ref 0–0.76)
DEPRECATED RDW RBC AUTO: 45.9 FL (ref 37–54)
EGFRCR SERPLBLD CKD-EPI 2021: 46.1 ML/MIN/1.73
EOSINOPHIL # BLD MANUAL: 0.05 10*3/MM3 (ref 0–0.4)
EOSINOPHIL NFR BLD MANUAL: 1 % (ref 0.3–6.2)
ERYTHROCYTE [DISTWIDTH] IN BLOOD BY AUTOMATED COUNT: 13.6 % (ref 12.3–15.4)
GEN 5 1HR TROPONIN T REFLEX: 8 NG/L
GLOBULIN UR ELPH-MCNC: 3.1 GM/DL
GLUCOSE SERPL-MCNC: 155 MG/DL (ref 65–99)
HCT VFR BLD AUTO: 38.3 % (ref 34–46.6)
HGB BLD-MCNC: 12.4 G/DL (ref 12–15.9)
HOLD SPECIMEN: NORMAL
HOLD SPECIMEN: NORMAL
LYMPHOCYTES # BLD MANUAL: 3.75 10*3/MM3 (ref 0.7–3.1)
LYMPHOCYTES NFR BLD MANUAL: 9 % (ref 5–12)
MCH RBC QN AUTO: 29.7 PG (ref 26.6–33)
MCHC RBC AUTO-ENTMCNC: 32.4 G/DL (ref 31.5–35.7)
MCV RBC AUTO: 91.6 FL (ref 79–97)
MONOCYTES # BLD: 0.48 10*3/MM3 (ref 0.1–0.9)
NEUTROPHILS # BLD AUTO: 1.07 10*3/MM3 (ref 1.7–7)
NEUTROPHILS NFR BLD MANUAL: 18 % (ref 42.7–76)
NEUTS BAND NFR BLD MANUAL: 2 % (ref 0–5)
NT-PROBNP SERPL-MCNC: 93.9 PG/ML (ref 0–1800)
PLAT MORPH BLD: NORMAL
PLATELET # BLD AUTO: 245 10*3/MM3 (ref 140–450)
PMV BLD AUTO: 8.6 FL (ref 6–12)
POTASSIUM SERPL-SCNC: 4.1 MMOL/L (ref 3.5–5.2)
PROT SERPL-MCNC: 7.5 G/DL (ref 6–8.5)
RBC # BLD AUTO: 4.18 10*6/MM3 (ref 3.77–5.28)
RBC MORPH BLD: NORMAL
SODIUM SERPL-SCNC: 135 MMOL/L (ref 136–145)
TROPONIN T NUMERIC DELTA: 0 NG/L
TROPONIN T SERPL HS-MCNC: 8 NG/L
TSH SERPL DL<=0.05 MIU/L-ACNC: 1.67 UIU/ML (ref 0.27–4.2)
VARIANT LYMPHS NFR BLD MANUAL: 63 % (ref 19.6–45.3)
VARIANT LYMPHS NFR BLD MANUAL: 7 % (ref 0–5)
WBC MORPH BLD: NORMAL
WBC NRBC COR # BLD AUTO: 5.35 10*3/MM3 (ref 3.4–10.8)
WHOLE BLOOD HOLD COAG: NORMAL
WHOLE BLOOD HOLD SPECIMEN: NORMAL

## 2025-05-02 PROCEDURE — 85379 FIBRIN DEGRADATION QUANT: CPT | Performed by: STUDENT IN AN ORGANIZED HEALTH CARE EDUCATION/TRAINING PROGRAM

## 2025-05-02 PROCEDURE — 99284 EMERGENCY DEPT VISIT MOD MDM: CPT | Performed by: STUDENT IN AN ORGANIZED HEALTH CARE EDUCATION/TRAINING PROGRAM

## 2025-05-02 PROCEDURE — 84443 ASSAY THYROID STIM HORMONE: CPT | Performed by: STUDENT IN AN ORGANIZED HEALTH CARE EDUCATION/TRAINING PROGRAM

## 2025-05-02 PROCEDURE — 83880 ASSAY OF NATRIURETIC PEPTIDE: CPT | Performed by: STUDENT IN AN ORGANIZED HEALTH CARE EDUCATION/TRAINING PROGRAM

## 2025-05-02 PROCEDURE — 80053 COMPREHEN METABOLIC PANEL: CPT | Performed by: STUDENT IN AN ORGANIZED HEALTH CARE EDUCATION/TRAINING PROGRAM

## 2025-05-02 PROCEDURE — 36415 COLL VENOUS BLD VENIPUNCTURE: CPT

## 2025-05-02 PROCEDURE — 85025 COMPLETE CBC W/AUTO DIFF WBC: CPT | Performed by: STUDENT IN AN ORGANIZED HEALTH CARE EDUCATION/TRAINING PROGRAM

## 2025-05-02 PROCEDURE — 25810000003 SODIUM CHLORIDE 0.9 % SOLUTION: Performed by: STUDENT IN AN ORGANIZED HEALTH CARE EDUCATION/TRAINING PROGRAM

## 2025-05-02 PROCEDURE — 71045 X-RAY EXAM CHEST 1 VIEW: CPT

## 2025-05-02 PROCEDURE — 73562 X-RAY EXAM OF KNEE 3: CPT

## 2025-05-02 PROCEDURE — 72125 CT NECK SPINE W/O DYE: CPT

## 2025-05-02 PROCEDURE — 85007 BL SMEAR W/DIFF WBC COUNT: CPT | Performed by: STUDENT IN AN ORGANIZED HEALTH CARE EDUCATION/TRAINING PROGRAM

## 2025-05-02 PROCEDURE — 93005 ELECTROCARDIOGRAM TRACING: CPT | Performed by: STUDENT IN AN ORGANIZED HEALTH CARE EDUCATION/TRAINING PROGRAM

## 2025-05-02 PROCEDURE — 70450 CT HEAD/BRAIN W/O DYE: CPT

## 2025-05-02 PROCEDURE — 84484 ASSAY OF TROPONIN QUANT: CPT | Performed by: STUDENT IN AN ORGANIZED HEALTH CARE EDUCATION/TRAINING PROGRAM

## 2025-05-02 RX ORDER — BLOOD-GLUCOSE METER
KIT MISCELLANEOUS
Qty: 1 EACH | Refills: 0 | Status: SHIPPED | OUTPATIENT
Start: 2025-05-02

## 2025-05-02 RX ADMIN — SODIUM CHLORIDE 500 ML: 900 INJECTION, SOLUTION INTRAVENOUS at 07:16

## 2025-05-02 NOTE — DISCHARGE INSTRUCTIONS
Instructions from Dr. Escobar:    It was a privilege being your ER physician today.    Please follow-up in clinic as we discussed.    Please return to the ER if you experience any worsening symptoms or for any other concerns.

## 2025-05-02 NOTE — ED PROVIDER NOTES
[facility]  Emergency Department Encounter  Emergency Medicine Physician Note       Pt Name: Harley Rodriges  MRN: [id number]  Pt :   1950  Room Number:    Date of encounter:  2025  PCP: Amy Baeza APRN  ED Physician: Lukasz Escobar DO    HPI:  Harley Rodriges is a 74 y.o. female who presents to the ED following a syncopal episode.  Patient states that this occurred at 5:30 AM this morning.  Patient was in the bathroom whenever this occurred.  States that she was drinking water and then proceeded to pass out.  She did fall on her right side.  Unsure if she hit her head.  States loss of conscious was brief.  Only complaints from the fall is right knee pain.  She was ambulatory after the fall.  Denies headache, neck pain, chest pain, shortness of breath, abdominal or back pain, problems urination or bowel movements.    Patient presents via EMS.  Heart rate was noted to be 55 en route.  Blood pressure stable.  No intervention.    Pertinent medical history: A-fib, hypertension, hyperlipidemia.    Currently takes metoprolol and Eliquis.    PAST MEDICAL HISTORY  Past Medical History:   Diagnosis Date    Anemia     Anxiety and depression     Atrial fibrillation 2023    Bell palsy 2017.     2023    Twice    Bradycardia     Cancer 2019    Squamous cell nose    Cataract     Colon polyp 2016    Coronary artery disease 2016    Bradycardia    COVID-19 vaccine series completed     Moderna x2, plus a booster    Diabetes mellitus     Diabetic polyneuropathy     Difficulty walking     Disease of thyroid gland     Duodenal ulcer 2022    Ear piercing     Elevated cholesterol     Erosive gastropathy 2022    Esophagitis     Fatty liver 2010    Fibromyalgia     Fibromyalgia     Fibromyalgia, primary 2000    Gastritis     GERD (gastroesophageal reflux disease)     Head injury 1967    Concussion    Headache, tension-type 1966    History of colon polyps      History of nuclear stress test 2013    HL (hearing loss) 2020    Hearing aids 2023    Hyperlipidemia     Hypertension     Hypothyroidism 2010    Iron deficiency     Irritable bowel syndrome 2015    [place] grade C esophagitis 07/07/2022    Low back pain 1966    Lyme disease 2016    Memory loss 2015    Migraine 1970    Obesity 1973    Pneumonia     Problems with swallowing     food and liquids    Rheumatoid arthritis     Seasonal allergies     Shingles 2016    Sleep apnea     no CPAP    Tremor 2022    Ulcer 2022    Endoscopy recent    Ulcer of esophagus without bleeding 07/07/2022    Urinary frequency     Vision loss 2023    Cataracts/floaters    Wears glasses     for reading     Current Outpatient Medications   Medication Instructions    acetaminophen (TYLENOL) 500 MG tablet 1 tablet, Every 6 Hours PRN    apixaban (ELIQUIS) 5 mg, Oral, Every 12 Hours Scheduled    atorvastatin (LIPITOR) 80 mg, Daily    dicyclomine (BENTYL) 20 mg, Oral, 3 Times Daily PRN    DULoxetine (CYMBALTA) 60 mg, Oral, 2 Times Daily    empagliflozin (JARDIANCE) 10 mg, Oral, Daily    estradiol (ESTRACE) 0.5 mg, Oral, Daily    glucose blood test strip Check glucose twice daily    glucose monitor monitoring kit Check glucose twice daily    loratadine (CLARITIN) 10 mg, Oral, Daily    meclizine (ANTIVERT) 25 mg, 3 Times Daily PRN    metoprolol succinate XL (TOPROL-XL) 25 mg, Oral, Daily    Multiple Vitamins-Minerals (ONE-A-DAY WOMENS 50+ PO) Take  by mouth. Indications: Supplement    nystatin (MYCOSTATIN) 034508 UNIT/GM cream 1 Application, Topical, 2 Times Daily    nystatin (MYCOSTATIN) 688012 UNIT/GM powder Topical, 2 Times Daily    omeprazole (priLOSEC) 40 MG capsule TAKE 1 CAPSULE BY MOUTH ONCE EVERY MORNING 30 MINUTES PRIOR TO BREAKFAST    pregabalin (LYRICA) 100 mg, Oral, 2 Times Daily    rosuvastatin (CRESTOR) 10 MG tablet     Thyroid (ARMOUR) 30 mg, Oral, Daily    traMADol (ULTRAM)  mg, Oral, Every 8 Hours PRN    traZODone (DESYREL)  100 MG tablet 0.5 tablets, Every Night at Bedtime    vitamin D3 125 MCG (5000 UT) capsule capsule 1 capsule, Daily      PAST SURGICAL HISTORY  Past Surgical History:   Procedure Laterality Date    APPENDECTOMY      1980    BREAST LUMPECTOMY WITH SENTINEL NODE BIOPSY AND AXILLARY NODE DISSECTION      1981-benign    BREAST SURGERY  1980    Benign RT    CARDIAC CATHETERIZATION      2015- no stents    COLONOSCOPY  2016    COLONOSCOPY N/A 01/15/2021    Procedure: COLONOSCOPY WITH BIOSPIES AND POLYPECTOMY;  Surgeon: Yuridia Vinson MD;  Location: [facility] ENDOSCOPY;  Service: Gastroenterology;  Laterality: N/A;    COLONOSCOPY W/ BIOPSIES  2016    Dr. Cason    ENDOSCOPY  2016    Dr. Cason    ENDOSCOPY N/A 05/12/2022    Procedure: ESOPHAGOGASTRODUODENOSCOPY WITH BIOPSY;  Surgeon: Yuridia Vinson MD;  Location: [facility] ENDOSCOPY;  Service: Gastroenterology;  Laterality: N/A;    ENDOSCOPY N/A 01/13/2023    Procedure: ESOPHAGOGASTRODUODENOSCOPY, biopsy;  Surgeon: Yuridia Vinson MD;  Location: [facility] ENDOSCOPY;  Service: Gastroenterology;  Laterality: N/A;    HYSTERECTOMY      1980    TONSILLECTOMY      1956 at age 10    TUBAL ABDOMINAL LIGATION      UPPER GASTROINTESTINAL ENDOSCOPY  2016    WISDOM TOOTH EXTRACTION         FAMILY HISTORY  Family History   Problem Relation Age of Onset    Heart disease Mother     Skin cancer Mother     Anxiety disorder Mother     Hearing loss Mother     Hypertension Mother     Heart disease Father     Arthritis Father     Polymyalgia rheumatica Father     Skin cancer Father     Dementia Father     Neuropathy Father     Hearing loss Father     Hypertension Father     Vision loss Father     Mental retardation Paternal Uncle         Downs Syndrome    Alcohol abuse Maternal Uncle     Cancer Maternal Uncle     Cancer Paternal Aunt     Cancer Paternal Uncle     Diabetes Paternal Uncle     Diabetes Brother     Hearing loss Brother     Heart disease Brother     Hypertension Brother      Hearing loss Brother     Vision loss Paternal Grandmother     Colon cancer Neg Hx     Inflammatory bowel disease Neg Hx     Breast cancer Neg Hx        SOCIAL HISTORY  Social History     Socioeconomic History    Marital status:    Tobacco Use    Smoking status: Never     Passive exposure: Never    Smokeless tobacco: Never   Vaping Use    Vaping status: Never Used   Substance and Sexual Activity    Alcohol use: Never    Drug use: Never    Sexual activity: Not Currently     Partners: Male     Birth control/protection: Post-menopausal, Tubal ligation, Birth control pill, Hysterectomy     ALLERGIES  Patient has no known allergies.    REVIEW OF SYSTEMS  All systems reviewed and negative except for those discussed in HPI.     PHYSICAL EXAM  ED Triage Vitals [05/02/25 0702]   Temp Heart Rate Resp BP SpO2   97.5 °F (36.4 °C) 64 18 163/95 94 %      Temp src Heart Rate Source Patient Position BP Location FiO2 (%)   Oral Monitor Sitting Left arm --     I have reviewed the triage vital signs and nursing notes.    General: Obese female.  Alert.  Nontoxic appearance.  No acute distress.  Head: Normocephalic.  Atraumatic.  Eyes: Pupils 2 mm.  PERRLA.  EOMI.  No scleral icterus.  Cardiovascular: Regular rate and rhythm.  No murmurs.  No rubs.  2+ distal pulses bilaterally.  Respiratory: Equal breath sounds bilaterally.  No rales.  No rhonchi.  No wheezing.  GI: Abdomen is soft.  Nondistended.  Nontender to palpation.  No rebound.  No guarding.  No CVA tenderness.  MSK: No cervical, thoracic, lumbar midline tenderness.  No step-off. Moves all 4 extremities.   No bony tenderness to the bilateral upper extremities.  + Tenderness palpation right anterior knee with full range of motion.  Neurologic: GCS 15. Oriented x 3.  No focal deficits.  Skin: No lacerations.  No abrasions.  No ecchymosis.    LAB RESULTS  Results for orders placed or performed during the hospital encounter of 05/02/25   TSH Rfx On Abnormal To Free T4     Specimen: Blood   Result Value Ref Range    TSH 1.670 0.270 - 4.200 uIU/mL   Gold Top - SST   Result Value Ref Range    Extra Tube Hold for add-ons.    Green Top (Gel)   Result Value Ref Range    Extra Tube Hold for add-ons.    High Sensitivity Troponin T 1Hr    Specimen: Blood   Result Value Ref Range    HS Troponin T 8 <14 ng/L    Troponin T Numeric Delta 0 Abnormal if >/=3 ng/L   CBC Auto Differential    Specimen: Blood   Result Value Ref Range    WBC 5.35 3.40 - 10.80 10*3/mm3    RBC 4.18 3.77 - 5.28 10*6/mm3    Hemoglobin 12.4 12.0 - 15.9 g/dL    Hematocrit 38.3 34.0 - 46.6 %    MCV 91.6 79.0 - 97.0 fL    MCH 29.7 26.6 - 33.0 pg    MCHC 32.4 31.5 - 35.7 g/dL    RDW 13.6 12.3 - 15.4 %    RDW-SD 45.9 37.0 - 54.0 fl    MPV 8.6 6.0 - 12.0 fL    Platelets 245 140 - 450 10*3/mm3   Lavender Top   Result Value Ref Range    Extra Tube hold for add-on    Light Blue Top   Result Value Ref Range    Extra Tube Hold for add-ons.    High Sensitivity Troponin T    Specimen: Blood   Result Value Ref Range    HS Troponin T 8 <14 ng/L   Manual Differential    Specimen: Blood   Result Value Ref Range    Neutrophil % 18.0 (L) 42.7 - 76.0 %    Lymphocyte % 63.0 (H) 19.6 - 45.3 %    Monocyte % 9.0 5.0 - 12.0 %    Eosinophil % 1.0 0.3 - 6.2 %    Bands %  2.0 0.0 - 5.0 %    Atypical Lymphocyte % 7.0 (H) 0.0 - 5.0 %    Neutrophils Absolute 1.07 (L) 1.70 - 7.00 10*3/mm3    Lymphocytes Absolute 3.75 (H) 0.70 - 3.10 10*3/mm3    Monocytes Absolute 0.48 0.10 - 0.90 10*3/mm3    Eosinophils Absolute 0.05 0.00 - 0.40 10*3/mm3    RBC Morphology Normal Normal    WBC Morphology Normal Normal    Platelet Morphology Normal Normal   D-dimer, Quantitative    Specimen: Blood   Result Value Ref Range    D-Dimer, Quantitative <0.27 0.00 - 0.76 MCGFEU/mL   BNP    Specimen: Blood   Result Value Ref Range    proBNP 93.9 0.0 - 1,800.0 pg/mL   Comprehensive Metabolic Panel    Specimen: Blood   Result Value Ref Range    Glucose 155 (H) 65 - 99 mg/dL    BUN 17  8 - 23 mg/dL    Creatinine 1.22 (H) 0.57 - 1.00 mg/dL    Sodium 135 (L) 136 - 145 mmol/L    Potassium 4.1 3.5 - 5.2 mmol/L    Chloride 98 98 - 107 mmol/L    CO2 26.0 22.0 - 29.0 mmol/L    Calcium 10.2 8.6 - 10.5 mg/dL    Total Protein 7.5 6.0 - 8.5 g/dL    Albumin 4.4 3.5 - 5.2 g/dL    ALT (SGPT) 14 1 - 33 U/L    AST (SGOT) 21 1 - 32 U/L    Alkaline Phosphatase 54 39 - 117 U/L    Total Bilirubin 0.3 0.0 - 1.2 mg/dL    Globulin 3.1 gm/dL    A/G Ratio 1.4 g/dL    BUN/Creatinine Ratio 13.9 7.0 - 25.0    Anion Gap 11.0 5.0 - 15.0 mmol/L    eGFR 46.1 (L) >60.0 mL/min/1.73   Results for orders placed or performed in visit on 04/14/25   Iron Profile    Specimen: Blood   Result Value Ref Range    TIBC 446 mcg/dL    UIBC 376 (H) 112 - 346 mcg/dL    Iron 70 37 - 145 mcg/dL    Iron Saturation 16 (L) 20 - 50 %   Vitamin D,25-Hydroxy    Specimen: Blood   Result Value Ref Range    25 Hydroxy, Vitamin D 56.5 30.0 - 100.0 ng/ml   CBC & Differential   Result Value Ref Range    WBC 4.52 3.40 - 10.80 10*3/mm3    RBC 4.08 3.77 - 5.28 10*6/mm3    Hemoglobin 12.4 12.0 - 15.9 g/dL    Hematocrit 36.8 34.0 - 46.6 %    MCV 90.2 79.0 - 97.0 fL    MCH 30.4 26.6 - 33.0 pg    MCHC 33.7 31.5 - 35.7 g/dL    RDW 12.9 12.3 - 15.4 %    Platelets 260 140 - 450 10*3/mm3    Neutrophil Rel % 36.1 (L) 42.7 - 76.0 %    Lymphocyte Rel % 52.7 (H) 19.6 - 45.3 %    Monocyte Rel % 8.0 5.0 - 12.0 %    Eosinophil Rel % 2.4 0.3 - 6.2 %    Basophil Rel % 0.4 0.0 - 1.5 %    Neutrophils Absolute 1.63 (L) 1.70 - 7.00 10*3/mm3    Lymphocytes Absolute 2.38 0.70 - 3.10 10*3/mm3    Monocytes Absolute 0.36 0.10 - 0.90 10*3/mm3    Eosinophils Absolute 0.11 0.00 - 0.40 10*3/mm3    Basophils Absolute 0.02 0.00 - 0.20 10*3/mm3    Immature Granulocyte Rel % 0.4 0.0 - 0.5 %    Immature Grans Absolute 0.02 0.00 - 0.05 10*3/mm3    nRBC 0.0 0.0 - 0.2 /100 WBC   T3, Free    Specimen: Blood   Result Value Ref Range    T3, Free 3.3 2.0 - 4.4 pg/mL   TSH    Specimen: Blood   Result  Value Ref Range    TSH 1.610 0.270 - 4.200 uIU/mL   Folate    Specimen: Blood   Result Value Ref Range    Folate 19.30 4.78 - 24.20 ng/mL   Ferritin    Specimen: Blood   Result Value Ref Range    Ferritin 262.00 (H) 13.00 - 150.00 ng/mL   Vitamin B12    Specimen: Blood   Result Value Ref Range    Vitamin B-12 452 211 - 946 pg/mL   Comprehensive Metabolic Panel    Specimen: Blood   Result Value Ref Range    Glucose 174 (H) 65 - 99 mg/dL    BUN 22 8 - 23 mg/dL    Creatinine 1.21 (H) 0.57 - 1.00 mg/dL    EGFR Result 46.5 (L) >60.0 mL/min/1.73    BUN/Creatinine Ratio 18.2 7.0 - 25.0    Sodium 136 136 - 145 mmol/L    Potassium 4.6 3.5 - 5.2 mmol/L    Chloride 100 98 - 107 mmol/L    Total CO2 25.6 22.0 - 29.0 mmol/L    Calcium 9.8 8.6 - 10.5 mg/dL    Total Protein 7.3 6.0 - 8.5 g/dL    Albumin 4.3 3.5 - 5.2 g/dL    Globulin 3.0 gm/dL    A/G Ratio 1.4 g/dL    Total Bilirubin 0.2 0.0 - 1.2 mg/dL    Alkaline Phosphatase 58 39 - 117 U/L    AST (SGOT) 21 1 - 32 U/L    ALT (SGPT) 13 1 - 33 U/L     *Note: Due to a large number of results and/or encounters for the requested time period, some results have not been displayed. A complete set of results can be found in Results Review.   Patient's close well-developed  RADIOLOGY  CT Cervical Spine Without Contrast  Result Date: 5/2/2025  No acute fracture.     CTDI: 17.69 mGy DLP:642.1 mGy.cm  This report was signed and finalized on 5/2/2025 10:36 AM by Gloria Vines MD.      CT Head Without Contrast  Result Date: 5/2/2025  Atrophy  without acute process.     CTDI: 17.69 mGy DLP:642.1 mGy.cm  This report was signed and finalized on 5/2/2025 10:34 AM by Gloria Vines MD.      XR Knee 3 View Right  Result Date: 5/2/2025  No acute bony abnormality.      This report was signed and finalized on 5/2/2025 9:13 AM by Gloria Vines MD.      XR Chest 1 View  Result Date: 5/2/2025  No acute cardiopulmonary process.     This report was signed and finalized on 5/2/2025 8:10 AM by Gloria Vines MD.       PROCEDURES  Procedures    RISK STRATIFICATION    MEDICAL DECISION MAKING  74 y.o. female with past medical history listed above who presents following syncopal episode.    Patient arrives via EMS.  I have reviewed the EMS documentation/notes and included that information in my HPI.    Vital signs remarkable for mild hypertension, otherwise within normal limits.  Pulse ox 94% on room air.    Based on clinical presentation and physical exam, differential diagnosis includes, but is not limited to, vasovagal syncope, orthostatic hypotension, dehydration, cardiac arrhythmia, pulmonary embolism, metabolic derangement, right knee contusion. Doubt fracture or dislocation. Unable to rule out intracranial and cervical spine trauma clinically given age and anticoagulation use. No clinical evidence of trauma to the chest abdomen pelvis.    External notes reviewed.  Echo 11/5/2023 reviewed.  EF 56-60%.  Grade 1 diastolic dysfunction present.  Left atrial cavity is dilated.  Calcification of the aortic valve without stenosis.    At least 3 different tests have been ordered on this patient.    Medications administered in ED:  Medications   sodium chloride 0.9 % bolus 500 mL (0 mL Intravenous Stopped 5/2/25 0826)     Please see ED course below for my interpretation of the ED workup.  ED Course as of 05/03/25 1315   Fri May 02, 2025   0724 ECG 12 Lead Syncope  EKG per my interpretation normal sinus rhythm, rate 66, normal axis, no ST segment elevation or depression, normal T waves, normal QRS QTC intervals, no Brugada or WPW pattern.   [JS]   9416 XR Chest 1 View  I have independently reviewed and interpreted the chest x-ray.  My interpretation is negative for infiltrate. [JS]   1038 I reviewed the labs listed above. Clinically unremarkable.    Notable findings are highlighted below.    Old laboratory data was reviewed from the medical records and compared to today's results.   [JS]   1038 Glucose(!): 155 [JS]   1038  Creatinine(!): 1.22 [JS]   1038 I have independently reviewed the imaging listed above.  My interpretations are listed below:    - CT head negative for intracranial hemorrhage.    - CT cervical spine negative for fracture or traumatic malalignment.    - X-ray right knee negative for displaced fracture or dislocation.     [JS]      ED Course User Index  [JS] Lukasz Escobar DO     On re-evaluation, patient resting comfortably.  States symptoms have improved following therapy. Vital signs remained stable on room air.  Patient was ambulatory in the ED with steady gait.  Able to tolerate oral intake appropriately.    I discussed the findings of the ED workup with the patient at bedside. Shared decision making discussed with the patient in regards to disposition.  I offered to admit the patient for medical observation and workup, but patient politely declined.  States that she feels better and request discharge.  Already follows with cardiology. Patient was informed of the indication, benefits, alternative diagnostic options, and risks including, but not limited to missed and/or delayed diagnosis, therefore leading to failure to treat. The patient is clinically not intoxicated, free from distracting pain, appears to have intact insight, judgment and reason and in my medical opinion has the capacity to make medical decisions. I recommended outpatient follow-up with PCP/cardiology.  Patient was deemed medically stable for discharge with close outpatient follow-up and strict ED return precautions. Patient agreeable with plan and disposition.    Chronic conditions affecting care: None    Social determinants of health impacting treatment or disposition: None    REPEAT VITAL SIGNS  AS OF 13:15 EDT VITALS:  BP - 156/94  HR - 53  TEMP - 97.5 °F (36.4 °C) (Oral)  O2 SATS - 95%    DIAGNOSIS  Final diagnoses:   Syncope, unspecified syncope type   Fall, initial encounter   Contusion of right knee, initial encounter      DISPOSITION  ED Disposition       ED Disposition   Discharge    Condition   Stable    Comment   --               PATIENT REFERRALS  Manoj Waldrop MD  [address] [place] [address] [phone]      Cardiology. 48 hours.    Amy Baeza, APRN  [address] [place] [address] [phone]      PCP. 48 hours.          Please note that portions of this document were completed with voice recognition software.        Lukasz Escobar DO  05/03/25 3637

## 2025-05-05 ENCOUNTER — OFFICE VISIT (OUTPATIENT)
Dept: FAMILY MEDICINE CLINIC | Facility: CLINIC | Age: 75
End: 2025-05-05
Payer: MEDICARE

## 2025-05-05 VITALS
BODY MASS INDEX: 37.05 KG/M2 | WEIGHT: 217 LBS | OXYGEN SATURATION: 97 % | DIASTOLIC BLOOD PRESSURE: 84 MMHG | HEIGHT: 64 IN | SYSTOLIC BLOOD PRESSURE: 112 MMHG | HEART RATE: 77 BPM

## 2025-05-05 DIAGNOSIS — R93.89 ABNORMAL CHEST X-RAY: ICD-10-CM

## 2025-05-05 DIAGNOSIS — R55 SYNCOPE, UNSPECIFIED SYNCOPE TYPE: Primary | ICD-10-CM

## 2025-05-05 DIAGNOSIS — R06.02 SHORTNESS OF BREATH: ICD-10-CM

## 2025-05-05 LAB — REF LAB TEST METHOD: NORMAL

## 2025-05-05 NOTE — PROGRESS NOTES
Subjective     Chief Complaint:    Chief Complaint   Patient presents with    Hospital Follow Up Visit     Syncope.        History of Present Illness:   ED f/u syncope, 0530 AM, got up to urinate, ED note reviewed and imaging reviewed, nothing acute,   She is established with cards and reached out to them, she has referral to EP. Recent holter was normal  She continues to feel weak, breathlessness, brain fog, feels like legs are weak, shortness of breath, has it laying down and with walking   She is sleeping well at night   Has had decline over the past few months in regards to fatigue         Review of Systems  Gen- No fevers, chills  CV- No chest pain, palpitations  GI- No N/V/D, abd pain  Neuro-No dizziness, headaches      I have reviewed and/or updated the patient's past medical, surgical, family, social history and problem list as appropriate.     Medications:    Current Outpatient Medications:     acetaminophen (TYLENOL) 500 MG tablet, Take 1 tablet by mouth Every 6 (Six) Hours As Needed for Mild Pain. Indications: Pain, Disp: , Rfl:     apixaban (ELIQUIS) 5 MG tablet tablet, Take 1 tablet by mouth Every 12 (Twelve) Hours. Indications: Atrial Fibrillation, Disp: 60 tablet, Rfl: 0    dicyclomine (BENTYL) 20 MG tablet, Take 1 tablet by mouth 3 (Three) Times a Day As Needed (lower abdominal pain and diarrhea). (Patient taking differently: Take 1 tablet by mouth As Needed (lower abdominal pain and diarrhea).), Disp: 30 tablet, Rfl: 1    DULoxetine (CYMBALTA) 60 MG capsule, Take 1 capsule by mouth 2 (Two) Times a Day. Indications: Peripheral Nerve Disease, Disp: 180 capsule, Rfl: 1    empagliflozin (Jardiance) 10 MG tablet tablet, Take 1 tablet by mouth Daily., Disp: 30 tablet, Rfl: 2    estradiol (ESTRACE) 0.5 MG tablet, Take 1 tablet by mouth Daily. Indications: Hair Loss, Disp: 90 tablet, Rfl: 3    glucose blood test strip, Check glucose twice daily, Disp: 100 each, Rfl: 12    glucose monitor monitoring  "kit, Check glucose twice daily, Disp: 1 each, Rfl: 0    loratadine (CLARITIN) 10 MG tablet, Take 1 tablet by mouth Daily. Indications: Hayfever, Disp: 90 tablet, Rfl: 1    meclizine (ANTIVERT) 25 MG tablet, Take 1 tablet by mouth 3 (Three) Times a Day As Needed for Dizziness., Disp: , Rfl:     metoprolol succinate XL (TOPROL-XL) 25 MG 24 hr tablet, Take 1 tablet by mouth Daily., Disp: 90 tablet, Rfl: 3    Multiple Vitamins-Minerals (ONE-A-DAY WOMENS 50+ PO), Take  by mouth. Indications: Supplement, Disp: , Rfl:     nystatin (MYCOSTATIN) 343737 UNIT/GM cream, Apply 1 Application topically to the appropriate area as directed 2 (Two) Times a Day., Disp: 30 g, Rfl: 1    nystatin (MYCOSTATIN) 684293 UNIT/GM powder, Apply  topically to the appropriate area as directed 2 (Two) Times a Day., Disp: 60 g, Rfl: 1    omeprazole (priLOSEC) 40 MG capsule, TAKE 1 CAPSULE BY MOUTH ONCE EVERY MORNING 30 MINUTES PRIOR TO BREAKFAST, Disp: 90 capsule, Rfl: 1    pregabalin (Lyrica) 100 MG capsule, Take 1 capsule by mouth 2 (Two) Times a Day., Disp: 60 capsule, Rfl: 2    rosuvastatin (CRESTOR) 10 MG tablet, , Disp: , Rfl:     Thyroid 30 MG PO tablet, Take 1 tablet by mouth Daily. Indications: Underactive Thyroid, Disp: 30 tablet, Rfl: 2    traMADol (Ultram) 50 MG tablet, Take 1-2 tablets by mouth Every 8 (Eight) Hours As Needed for Moderate Pain., Disp: 90 tablet, Rfl: 1    traZODone (DESYREL) 100 MG tablet, Take 0.5 tablets by mouth every night at bedtime. Indications: Anxiety Disorder, Trouble Sleeping, Disp: , Rfl:     vitamin D3 125 MCG (5000 UT) capsule capsule, Take 1 capsule by mouth Daily. Indications: Supplement, Disp: , Rfl:     atorvastatin (LIPITOR) 80 MG tablet, Take 1 tablet by mouth Daily., Disp: , Rfl:     Allergies:  No Known Allergies    Objective     Vital Signs:   Vitals:    05/05/25 0904   BP: 112/84   Pulse: 77   SpO2: 97%   Weight: 98.4 kg (217 lb)   Height: 162.6 cm (64\")     Body mass index is 37.25 " kg/m².    Physical Exam:    Physical Exam  Vitals and nursing note reviewed.   Constitutional:       Appearance: She is well-developed.   HENT:      Head: Normocephalic and atraumatic.   Eyes:      Pupils: Pupils are equal, round, and reactive to light.   Cardiovascular:      Rate and Rhythm: Normal rate and regular rhythm.      Heart sounds: Normal heart sounds.   Pulmonary:      Effort: Pulmonary effort is normal.      Breath sounds: Normal breath sounds.   Abdominal:      General: Bowel sounds are normal. There is no distension.      Palpations: Abdomen is soft.      Tenderness: There is no abdominal tenderness.   Musculoskeletal:      Cervical back: Neck supple.   Skin:     General: Skin is warm and dry.   Neurological:      General: No focal deficit present.      Mental Status: She is alert and oriented to person, place, and time.   Psychiatric:         Mood and Affect: Mood normal.         Behavior: Behavior normal.         Assessment / Plan     Assessment/Plan:   Problem List Items Addressed This Visit    None  Visit Diagnoses         Syncope, unspecified syncope type    -  Primary    Relevant Orders    US Carotid Bilateral    Ambulatory Referral to Social Care Services (Amb Case Mgmt)    CT Chest Pulmonary Embolism With Contrast      Shortness of breath        Relevant Orders    Ambulatory Referral to Social Care Services (Amb Case Mgmt)    CT Chest Pulmonary Embolism With Contrast      Abnormal chest x-ray        Relevant Orders    CT Chest Pulmonary Embolism With Contrast          -- ED course reviewed, due to SOA will rule out PE, also thoracic aorta noted to be tortious on CXR. I am concerned that she may need a heart cath and a blockage could be causing her symptoms. Will see what CT PE protocol shows. She is established with cards but not interventional. May need additional cards consult     Discussed plan of care in detail with pt today; pt verb understanding and agrees.    Follow up:  As scheduled      Electronically signed by AUSTIN Hickey   05/05/2025 09:20 EDT      Please note that portions of this note were completed with a voice recognition program.

## 2025-05-06 ENCOUNTER — EXTERNAL PBMM DATA (OUTPATIENT)
Dept: PHARMACY | Facility: OTHER | Age: 75
End: 2025-05-06
Payer: MEDICARE

## 2025-05-06 ENCOUNTER — HOSPITAL ENCOUNTER (OUTPATIENT)
Dept: CT IMAGING | Facility: HOSPITAL | Age: 75
Discharge: HOME | End: 2025-05-06
Admitting: NURSE PRACTITIONER
Payer: MEDICARE

## 2025-05-06 ENCOUNTER — REFERRAL TRIAGE (OUTPATIENT)
Age: 75
End: 2025-05-06
Payer: MEDICARE

## 2025-05-06 DIAGNOSIS — R55 SYNCOPE, UNSPECIFIED SYNCOPE TYPE: ICD-10-CM

## 2025-05-06 DIAGNOSIS — R06.02 SHORTNESS OF BREATH: ICD-10-CM

## 2025-05-06 DIAGNOSIS — R93.89 ABNORMAL CHEST X-RAY: ICD-10-CM

## 2025-05-06 PROCEDURE — 71275 CT ANGIOGRAPHY CHEST: CPT

## 2025-05-06 PROCEDURE — 25510000001 IOPAMIDOL 61 % SOLUTION: Performed by: NURSE PRACTITIONER

## 2025-05-06 RX ORDER — IOPAMIDOL 612 MG/ML
100 INJECTION, SOLUTION INTRAVASCULAR
Status: COMPLETED | OUTPATIENT
Start: 2025-05-06 | End: 2025-05-06

## 2025-05-06 RX ADMIN — IOPAMIDOL 100 ML: 612 INJECTION, SOLUTION INTRAVENOUS at 08:54

## 2025-05-08 ENCOUNTER — PATIENT OUTREACH (OUTPATIENT)
Age: 75
End: 2025-05-08
Payer: MEDICARE

## 2025-05-08 NOTE — OUTREACH NOTE
SW attempted contact with [facility]. SW will attempt again in a couple of business days.     Jose COURTNEY -   Ambulatory Case Management    5/8/2025, 10:52 EDT

## 2025-05-08 NOTE — OUTREACH NOTE
Social Work Assessment  Questions/Answers      Flowsheet Row Most Recent Value   Referral Source physician   Reason for Consult community resources, financial concerns   Preferred Language English   People in Home alone   Potentially Unsafe Housing Conditions none   In the past 12 months has the electric, gas, oil, or water company threatened to shut off services in your home? No   Primary Care Provided by self   Quality of Family Relationships unable to assess   Source of Income social security   Financial/Environmental Concerns other (see comments)   Application for Public Assistance obtained public assistance pending number   Q1: How often do you have a drink containing alcohol? Pt Declined   Q2: How many drinks containing alcohol do you have on a typical day when you are drinking? Pt Declined   Q3: How often do you have six or more drinks on one occasion? Pt Declined   Audit-C Score -1        SDOH updated and reviewed with the patient during this program:  --     Alcohol Use: Patient Declined (5/8/2025)    AUDIT-C     Frequency of Alcohol Consumption: Patient declined     Average Number of Drinks: Patient declined     Frequency of Binge Drinking: Patient declined      --     Depression: Not at risk (11/25/2024)    PHQ-2     PHQ-2 Score: 0      --     Disabilities: Not At Risk (10/16/2024)    Disabilities     Concentrating, Remembering, or Making Decisions Difficulty: no     Doing Errands Independently Difficulty: no      --     Employment: Not At Risk (10/16/2024)    Employment     Do you want help finding or keeping work or a job?: I do not need or want help      Financial Resource Strain: Medium Risk (5/8/2025)    Overall Financial Resource Strain (CARDIA)     Difficulty of Paying Living Expenses: Somewhat hard      --     Food Insecurity: No Food Insecurity (5/8/2025)    Hunger Vital Sign     Worried About Running Out of Food in the Last Year: Never true     Ran Out of Food in the Last Year: Never true       --     Health Literacy: Unknown (5/8/2025)    Education     Preferred Language: English      --     Housing Stability: Unknown (5/8/2025)    Housing Stability Vital Sign     Unable to Pay for Housing in the Last Year: No     Homeless in the Last Year: No      --     Interpersonal Safety: Unknown (5/8/2025)    Humiliation, Afraid, Rape, and Kick questionnaire     Fear of Current or Ex-Partner: Patient declined     Emotionally Abused: Patient declined     Physically Abused: Patient declined     Sexually Abused: No      --     Physical Activity: Patient Declined (5/8/2025)    Exercise Vital Sign     Days of Exercise per Week: Patient declined     Minutes of Exercise per Session: Patient declined      --     Social Connections: Unknown (5/8/2025)    Social Connection and Isolation Panel [NHANES]     Frequency of Communication with Friends and Family: Patient declined     Marital Status:       --     Stress: Stress Concern Present (5/8/2025)    [employer] - Occupational Stress Questionnaire     Feeling of Stress : To some extent      --     Tobacco Use: Low Risk  (5/5/2025)    Patient History     Smoking Tobacco Use: Never     Smokeless Tobacco Use: Never     Passive Exposure: Never      --     Transportation Needs: No Transportation Needs (5/8/2025)    PRAPARE - Transportation     Lack of Transportation (Medical): No     Lack of Transportation (Non-Medical): No      --     Utilities: Not At Risk (5/8/2025)    [facility] Utilities     Threatened with loss of utilities: No      Continuing Care   [facility] ON AGING & INDEPENDENT LIVING    [address] , [place] [address]    Phone: [phone]    Request Status: Accepted    Services: Elder Community Support/Recreation    Resource for: Social Connections   Patient Outreach    SW spoke with pt re her referral for financial assistance with prescription costs. SW discussed PAP program for Eliquis. SW offered to  send application for that to pt email ,which she obliged. SW also discussed the Medicare Extra Help program and sent pt a link to apply for that as well. SW included KPAP information in that e-mail, and also provided pt with the email address for [email or web address]. SW stated she would F/uwith pt in a couple of weeks. Pt expressed thanks. ANGELA also offered to send a referral to nursing at pt request to consult with a nurse ANNA COURTNEY -   Ambulatory Case Management    5/8/2025, 11:32 EDT

## 2025-05-12 DIAGNOSIS — G89.29 CHRONIC BILATERAL LOW BACK PAIN WITHOUT SCIATICA: ICD-10-CM

## 2025-05-12 DIAGNOSIS — M54.50 CHRONIC BILATERAL LOW BACK PAIN WITHOUT SCIATICA: ICD-10-CM

## 2025-05-12 RX ORDER — TRAMADOL HYDROCHLORIDE 50 MG/1
50-100 TABLET ORAL EVERY 8 HOURS PRN
Qty: 90 TABLET | Refills: 1 | Status: SHIPPED | OUTPATIENT
Start: 2025-05-12

## 2025-05-12 NOTE — TELEPHONE ENCOUNTER
Rx Refill Note  Requested Prescriptions     Pending Prescriptions Disp Refills    traMADol (ULTRAM) 50 MG tablet [Pharmacy Med Name: tramadol 50 mg tablet] 90 tablet 1     Sig: TAKE 1 OR 2 TABLETS BY MOUTH EVERY 8 HOURS AS NEEDED FOR PAIN      Last office visit with prescribing clinician: 5/5/2025   Last telemedicine visit with prescribing clinician: Visit date not found   Next office visit with prescribing clinician: 5/28/2025                         Would you like a call back once the refill request has been completed: [] Yes [] No    If the office needs to give you a call back, can they leave a voicemail: [] Yes [] No    Carmelina Rondon MA  05/12/25, 14:09 EDT

## 2025-05-14 ENCOUNTER — PATIENT OUTREACH (OUTPATIENT)
Dept: CASE MANAGEMENT | Facility: OTHER | Age: 75
End: 2025-05-14
Payer: MEDICARE

## 2025-05-14 NOTE — PLAN OF CARE
Problem: General Plan of Care  Goal: Make and Keep All Appointments  Outcome: Progressing  Intervention: My Appointments To Do List  Description: Why is this important?Part of staying healthy is seeing the doctor for follow-up care.If you forget your appointments, there are some things you can do to stay on track.  Flowsheets (Taken 5/14/2025 1210)  My Appointments To Do List:   call to cancel if needed   keep a calendar with appointment dates   keep a calendar with prescription refill dates  Goal: Find Help in My Community  Outcome: Progressing  Intervention: My Community Help To Do List  Description: Why is this important?Knowing how and where to find help for yourself or family in your neighborhood and community is an important skill.You will want to take some steps to learn how.  Flowsheets (Taken 5/14/2025 1210)  My Community Help To Do List:   begin a notebook of services in my neighborhood or community   follow-up on any referrals for help I am given   make a note about what I need to have by the phone or take with me, like an identification card or social security number have a back-up plan   have a back-up plan   make a list of family or friends that I can call   think ahead to make sure my need does not become an emergency  Goal: Protect My Health  Outcome: Progressing  Intervention: My Health Protection To Do List  Description: Why is this important?Screening tests can find diseases early when they are easier to treat.Your doctor or nurse will talk with you about which tests are important for you.Getting shots for common diseases like the flu and shingles will help prevent them.  Flowsheets (Taken 5/14/2025 1210)  My Health Protection To Do List:   schedule appointment for vaccines needed due to my age or health   schedule recommended health tests (blood work, mammogram, colonoscopy, pap test)   schedule and keep appointment for annual check-up  Goal: Manage My Emotions  Outcome: Progressing  Goal:  Maintain My Quality of Life  Outcome: Progressing  Goal: Optimal Care Coordination  Outcome: Progressing  Intervention: Develop Relationship to Effect Behavior Change  Flowsheets (Taken 5/14/2025 1210)  Develop Relationship to Effect Behavior Change:   acceptance conveyed   care explained   choices provided   collaboration with team encouraged   questions encouraged   questions answered   empathic listening provided   emotional support provided   rapport fostered   reassurance provided   self-reliance encouraged   verbalization of feelings encouraged  Intervention: Mutually Develop and Foster Achievement of Patient Goals  Flowsheets (Taken 5/14/2025 1210)  Mutually Develop and Foster Achievement of Patient Goals:   barriers to meeting goals identified   change-talk evoked   choices provided   difficulty of making lifelong changes acknowledged   decision-making supported   collaboration with team encouraged   health risks reviewed   problem-solving facilitated   questions answered   readiness for change evaluated   self-reflection promoted   resources needed to meet goals identified   verbalization of feelings encouraged   reassurance provided   self-reliance encouraged  Intervention: Support Psychosocial Response to Risk or Actual Health Condition  Flowsheets (Taken 5/14/2025 1210)  Support Psychosocial Response to Risk or Actual Health Condition:   active listening utilized   counseling provided   current coping strategies identified   healthy lifestyle promoted   decision-making supported   mindfulness encouraged   participation in counseling encouraged   problem-solving facilitated   relaxation techniques promoted   self-reflection promoted   verbalization of feelings encouraged

## 2025-05-14 NOTE — OUTREACH NOTE
AMBULATORY CASE MANAGEMENT NOTE    Names and Relationships of Patient/Support Persons: Contact: Harley Rodriges; Relationship: Self -     Patient Outreach    RN-ACM outreach to patient per referral from  per pt request. Patient reports she experiences a lot of pain from fibromyalgia and is scheduled to see neurology. Has hx DM II with close-to-goal A1c, pt v/u of blood sugar goals/mgmt. Pt takes eliquis for afib but unable to afford due to cost; currently taking samples and has enough to last until 5/23/25. Patient has been working with  and is completing forms for medication assistance. ACM instructed patient to advise her or PCP next week if she gets down to 4-5 doses before medication cost issue is resolved so that this need can be addressed, ACM advised PCP office may be able to provide additional samples or switch to an alternative medication, stressed importance of not missing doses. Pt does have impaired kidney function with baseline creatinine around 1.22; patient does not follow with nephrology but PCP checks kidney function with routine labs. ACM provided education on protecting renal health, pt v/u, states she takes Tylenol but avoids NSAIDs. Patient does take Tramadol for fibro pain. Pt agreeable to engagement, voices appreciation of outreach. Care plan created, SDOH questionnaire sent via [employer]. Future outreach scheduled.     Adult Patient Profile  Questions/Answers      Flowsheet Row Most Recent Value   Symptoms/Conditions Managed at Home diabetes, type 2, neurological   Barriers to Managing Health none   Diabetes Management Strategies blood glucose testing, adequate rest, diet modification, medication therapy, routine screenings   A1C Target <7   Usual Blood Glucose 120's-150's   Last A1C Result 7.8 3/6/25   Diabetes Self-Management Outcome well   Neurological Symptoms/Conditions other (see comments)  [fibromyalgia]   Neurological Management Strategies adequate rest, coping strategies,  medication therapy   Neurological Self-Management Outcome unsure   Neurological Comment Pt reports fibromyalgia pain,  is seeing neuro   Identifying Health Goals be more active, keep illness under control, making sure to get medicine, having enough (or any) health insurance   Taking Medications Not Prescribed no   Missed Doses of Prescribed Medications During Past Week no   Taken Prescribed Medications at Different Time or Schedule During Past Week no   Taken More or Less Medication Than Prescribed no   Barriers to Taking Medication as Prescribed unable to afford medicine  [Using eliquis samples and applying for Medicaid/extra help currently]            Education Documentation  Unresolved/Worsening Symptoms, taught by Valerie Ortiz, RN at 5/14/2025 12:10 PM.  Learner: Patient  Readiness: Acceptance  Method: Explanation  Response: Verbalizes Understanding    Prevention of Complications, taught by Valerie Ortiz, RN at 5/14/2025 12:10 PM.  Learner: Patient  Readiness: Acceptance  Method: Explanation  Response: Verbalizes Understanding    Consistent Eating Pattern, taught by Valerie Ortiz, RN at 5/14/2025 12:10 PM.  Learner: Patient  Readiness: Acceptance  Method: Explanation  Response: Verbalizes Understanding    Healthy Food Choices, taught by Valerie Ortiz, RN at 5/14/2025 12:10 PM.  Learner: Patient  Readiness: Acceptance  Method: Explanation  Response: Verbalizes Understanding    Blood Glucose Monitoring, taught by Valerie Ortiz, RN at 5/14/2025 12:10 PM.  Learner: Patient  Readiness: Acceptance  Method: Explanation  Response: Verbalizes Understanding    Blood Glucose Goal, taught by Valerie Ortiz, RN at 5/14/2025 12:10 PM.  Learner: Patient  Readiness: Acceptance  Method: Explanation  Response: Verbalizes Understanding          Valerie NUÑEZ  Ambulatory Case Management    5/14/2025, 12:11 EDT

## 2025-05-18 DIAGNOSIS — J30.9 ALLERGIC RHINITIS, UNSPECIFIED SEASONALITY, UNSPECIFIED TRIGGER: ICD-10-CM

## 2025-05-18 DIAGNOSIS — E11.40 TYPE 2 DIABETES MELLITUS WITH DIABETIC NEUROPATHY, WITHOUT LONG-TERM CURRENT USE OF INSULIN: ICD-10-CM

## 2025-05-18 DIAGNOSIS — K21.00 GASTROESOPHAGEAL REFLUX DISEASE WITH ESOPHAGITIS WITHOUT HEMORRHAGE: Chronic | ICD-10-CM

## 2025-05-19 RX ORDER — LORATADINE 10 MG/1
TABLET ORAL
Qty: 90 TABLET | Refills: 1 | Status: SHIPPED | OUTPATIENT
Start: 2025-05-19

## 2025-05-19 RX ORDER — APIXABAN 5 MG/1
TABLET, FILM COATED ORAL
Qty: 180 TABLET | Refills: 1 | Status: SHIPPED | OUTPATIENT
Start: 2025-05-19

## 2025-05-19 RX ORDER — BLOOD SUGAR DIAGNOSTIC
STRIP MISCELLANEOUS
Qty: 200 EACH | Refills: 1 | Status: SHIPPED | OUTPATIENT
Start: 2025-05-19

## 2025-05-19 RX ORDER — EMPAGLIFLOZIN 10 MG/1
TABLET, FILM COATED ORAL
Qty: 90 TABLET | Refills: 1 | Status: SHIPPED | OUTPATIENT
Start: 2025-05-19

## 2025-05-19 RX ORDER — DULOXETIN HYDROCHLORIDE 60 MG/1
CAPSULE, DELAYED RELEASE ORAL
Qty: 180 CAPSULE | Refills: 1 | Status: SHIPPED | OUTPATIENT
Start: 2025-05-19

## 2025-05-19 RX ORDER — ESTRADIOL 0.5 MG/1
TABLET ORAL
Qty: 90 TABLET | Refills: 1 | Status: SHIPPED | OUTPATIENT
Start: 2025-05-19

## 2025-05-19 RX ORDER — OMEPRAZOLE 40 MG/1
CAPSULE, DELAYED RELEASE ORAL
Qty: 90 CAPSULE | Refills: 1 | Status: SHIPPED | OUTPATIENT
Start: 2025-05-19

## 2025-05-19 NOTE — TELEPHONE ENCOUNTER
Rx Refill Note  Requested Prescriptions     Pending Prescriptions Disp Refills    cyclobenzaprine (FLEXERIL) 10 MG tablet [Pharmacy Med Name: CYCLOBENZAPRINE 10MG TAB 10 Tablet] 90 tablet 1     Sig: TAKE 1 TABLET BY MOUTH EVERY DAY *NEW PRESCRIPTION REQUEST*    meclizine (ANTIVERT) 25 MG tablet [Pharmacy Med Name: MECLIZINE 25 MG TABS 25 Tablet] 90 tablet 1     Sig: TAKE 1 TABLET BY MOUTH EVERY 8 HOURS AS NEEDED *NEW PRESCRIPTION REQUEST*    Alcohol Swabs (Alcohol Prep) 70 % pads [Pharmacy Med Name: ALCOHOL SWABS 100CT 70 Pad] 200 each 01     Sig: USE 2 ALCOHOL PADS PER DAY WHEN ADMINISTERING INSULIN OR TESTING BLOOD SUGAR *NEW PRESCRIPTION REQUEST*     Signed Prescriptions Disp Refills    estradiol (ESTRACE) 0.5 MG tablet 90 tablet 1     Sig: TAKE 1 TABLET BY MOUTH EVERY DAY *NEW PRESCRIPTION REQUEST*     Authorizing Provider: CHERYL AREVALO     Ordering User: RASHAUN RUSH    empagliflozin (Jardiance) 10 MG tablet tablet 90 tablet 1     Sig: TAKE 1 TABLET BY MOUTH EVERY DAY *NEW PRESCRIPTION REQUEST*     Authorizing Provider: CHERYL AREVALO     Ordering User: RASHAUN RUSH    loratadine (CLARITIN) 10 MG tablet 90 tablet 1     Sig: TAKE 1 TABLET BY MOUTH EVERY DAY *NEW PRESCRIPTION REQUEST*     Authorizing Provider: CHERYL AREVALO     Ordering User: RASHAUN RUSH    DULoxetine (CYMBALTA) 60 MG capsule 180 capsule 1     Sig: TAKE ONE (1) CAPSULE BY MOUTH TWICE DAILY *NEW PRESCRIPTION REQUEST*     Authorizing Provider: CHERYL AREVALO User: RASHAUN RUSH    omeprazole (priLOSEC) 40 MG capsule 90 capsule 1     Sig: TAKE ONE (1) CAPSULE BY MOUTH ONCE DAILY *NEW PRESCRIPTION REQUEST*     Authorizing Provider: CHERYL AREVALO     Ordering User: RASHAUN RUSH    apixaban (Eliquis) 5 MG tablet tablet 180 tablet 1     Sig: TAKE ONE (1) TABLET BY MOUTH TWICE DAILY *NEW PRESCRIPTION REQUEST*     Authorizing Provider: CHERYL AREVALO     Ordering User: RASHAUN RUSH    glucose  blood (Accu-Chek Guide Test) test strip 200 each 1     Sig: USE TO TEST BLOOD SUGAR TWICE DAILY *NEW PRESCRIPTION REQUEST*     Authorizing Provider: CHERYL AREVALO     Ordering User: CELIA RUSH      Last office visit with prescribing clinician: 5/5/2025   Last telemedicine visit with prescribing clinician: Visit date not found   Next office visit with prescribing clinician: 5/28/2025                         Would you like a call back once the refill request has been completed: [] Yes [] No    If the office needs to give you a call back, can they leave a voicemail: [] Yes [] No    Celia Moore, [facility]  05/19/25, 15:57 EDT

## 2025-05-20 RX ORDER — CYCLOBENZAPRINE HCL 10 MG
TABLET ORAL
Qty: 90 TABLET | Refills: 1 | Status: SHIPPED | OUTPATIENT
Start: 2025-05-20

## 2025-05-20 RX ORDER — UBIQUINOL 100 MG
CAPSULE ORAL
Qty: 200 EACH | Refills: 1 | Status: SHIPPED | OUTPATIENT
Start: 2025-05-20

## 2025-05-20 RX ORDER — TRAZODONE HYDROCHLORIDE 100 MG/1
TABLET ORAL
Qty: 90 TABLET | Refills: 11 | OUTPATIENT
Start: 2025-05-20

## 2025-05-20 RX ORDER — MECLIZINE HYDROCHLORIDE 25 MG/1
TABLET ORAL
Qty: 90 TABLET | Refills: 1 | Status: SHIPPED | OUTPATIENT
Start: 2025-05-20

## 2025-05-20 NOTE — TELEPHONE ENCOUNTER
This medication was not ordered by our clinic.  It appears to have been ordered from PCP.  When she saw Dr. Christie in April, he had suggested to decrease the dose of trazodone and then to discuss it with her PCP since she was following up with them soon.  Please follow-up with PCP regarding trazodone.

## 2025-05-22 ENCOUNTER — PATIENT OUTREACH (OUTPATIENT)
Age: 75
End: 2025-05-22
Payer: MEDICARE

## 2025-05-22 ENCOUNTER — TELEPHONE (OUTPATIENT)
Dept: FAMILY MEDICINE CLINIC | Facility: CLINIC | Age: 75
End: 2025-05-22

## 2025-05-22 DIAGNOSIS — E03.9 HYPOTHYROIDISM, UNSPECIFIED TYPE: ICD-10-CM

## 2025-05-22 NOTE — TELEPHONE ENCOUNTER
Caller: [facility], [place] - [address] [phone] [facility] [phone] FX    Relationship: Pharmacy    Best call back number: [phone]     What medication are you requesting: NIVA THYROID 30MG TABLETS, AND THEY ARE ALSO REUESTING PRESCRIPTION FOR LANCETS. THEY ADVISED THEY DID NOT RECEIVE THEM WITH THE REST OF THE PRESCRIPTIONS THAT WERE SENT IN    If a prescription is needed, what is your preferred pharmacy and phone number: [facility], [place] - [address] - [phone] [facility] [phone] FX

## 2025-05-22 NOTE — OUTREACH NOTE
Patient Outreach    SW F/u with pt re the status of engagement with prescription assistance resources. Per pt she cannot apply for Eliquis assistance without denial from Extra [employer]. SW reviewed that this was discussed on the last call, and that she would need to apply for the Extra Help program through the link provided to the patient's email as was discussed previously. Pt verbalized forgetting this directive. SW also reviewed the KPAP program again and pt verbalized understanding that. SW inquired about other SDOH needs. Pt verbalized general needs due to financial strain. SW offered to send her a link to [facility] resource directory site, to review the resources in the [facility], and to contact SW back with any questions or further needs thereafter. Pt expressed thanks and is agreeable to this plan. SW will continue to monitor for the next thirty days.     Jose COURTNEY -   Ambulatory Case Management    5/22/2025, 13:30 EDT

## 2025-05-27 ENCOUNTER — OFFICE VISIT (OUTPATIENT)
Age: 75
End: 2025-05-27
Payer: MEDICARE

## 2025-05-27 VITALS
BODY MASS INDEX: 36.88 KG/M2 | SYSTOLIC BLOOD PRESSURE: 118 MMHG | HEART RATE: 74 BPM | WEIGHT: 216 LBS | DIASTOLIC BLOOD PRESSURE: 71 MMHG | OXYGEN SATURATION: 98 % | HEIGHT: 64 IN

## 2025-05-27 DIAGNOSIS — E11.65 TYPE 2 DIABETES MELLITUS WITH HYPERGLYCEMIA, WITHOUT LONG-TERM CURRENT USE OF INSULIN: ICD-10-CM

## 2025-05-27 DIAGNOSIS — E03.9 ACQUIRED HYPOTHYROIDISM: Primary | ICD-10-CM

## 2025-05-27 DIAGNOSIS — G89.29 CHRONIC BILATERAL LOW BACK PAIN WITHOUT SCIATICA: ICD-10-CM

## 2025-05-27 DIAGNOSIS — M54.50 CHRONIC BILATERAL LOW BACK PAIN WITHOUT SCIATICA: ICD-10-CM

## 2025-05-27 DIAGNOSIS — R35.89 POLYURIA: ICD-10-CM

## 2025-05-27 LAB
BACTERIA UR QL AUTO: ABNORMAL /HPF
BILIRUB BLD-MCNC: NEGATIVE MG/DL
BILIRUB UR QL STRIP: NEGATIVE
CLARITY UR: CLEAR
CLARITY, POC: CLEAR
COLOR UR: YELLOW
COLOR UR: YELLOW
EXPIRATION DATE: ABNORMAL
GLUCOSE UR STRIP-MCNC: ABNORMAL MG/DL
GLUCOSE UR STRIP-MCNC: ABNORMAL MG/DL
HGB UR QL STRIP.AUTO: NEGATIVE
HOLD SPECIMEN: NORMAL
HYALINE CASTS UR QL AUTO: ABNORMAL /LPF
KETONES UR QL STRIP: NEGATIVE
KETONES UR QL: NEGATIVE
LEUKOCYTE EST, POC: NEGATIVE
LEUKOCYTE ESTERASE UR QL STRIP.AUTO: NEGATIVE
Lab: ABNORMAL
NITRITE UR QL STRIP: NEGATIVE
NITRITE UR-MCNC: NEGATIVE MG/ML
PH UR STRIP.AUTO: 7 [PH] (ref 5–8)
PH UR: 6.5 [PH] (ref 5–8)
PROT UR QL STRIP: ABNORMAL
PROT UR STRIP-MCNC: NEGATIVE MG/DL
RBC # UR STRIP: ABNORMAL /HPF
RBC # UR STRIP: NEGATIVE /UL
REF LAB TEST METHOD: ABNORMAL
SP GR UR STRIP: >1.03 (ref 1–1.03)
SP GR UR: 1.01 (ref 1–1.03)
SQUAMOUS #/AREA URNS HPF: ABNORMAL /HPF
UROBILINOGEN UR QL STRIP: ABNORMAL
UROBILINOGEN UR QL: NORMAL
WBC # UR STRIP: ABNORMAL /HPF

## 2025-05-27 PROCEDURE — 81001 URINALYSIS AUTO W/SCOPE: CPT | Performed by: PHYSICIAN ASSISTANT

## 2025-05-27 RX ORDER — TRAMADOL HYDROCHLORIDE 50 MG/1
TABLET ORAL
Qty: 90 TABLET | Refills: 1 | OUTPATIENT
Start: 2025-05-27

## 2025-05-27 RX ORDER — LEVOTHYROXINE SODIUM 50 UG/1
50 TABLET ORAL
Qty: 30 TABLET | Refills: 2 | Status: SHIPPED | OUTPATIENT
Start: 2025-05-27 | End: 2025-05-29 | Stop reason: SDUPTHER

## 2025-05-27 RX ORDER — ESTRADIOL 0.5 MG/1
0.5 TABLET ORAL DAILY
Qty: 90 TABLET | Refills: 2 | Status: SHIPPED | OUTPATIENT
Start: 2025-05-27

## 2025-05-27 RX ORDER — NYSTATIN 100000 U/G
CREAM TOPICAL
Qty: 30 G | Refills: 11 | Status: SHIPPED | OUTPATIENT
Start: 2025-05-27

## 2025-05-27 NOTE — PROGRESS NOTES
Chief Complaint   Patient presents with    Abnormal Lab     New patient referral from [facility]  Elevated TSH      HPI  Harley Rodriges is a 74 y.o. female presents today for evaluation of hypothyroidism. Referring Provider: Amy Baeza APRN.     Accompanied by sister in law at visit today. Family assisting with medication management.     Reports worsening symptoms over time since having fall around 9/2023, passed out at the time:  - fatigue  - Muscle aches, joint pains, sensitivity to light touch  - Intermittent dizziness/lightheadedness, sweating, palpitations, chest pains; hx a fib  - Heat and cold intolerance  - tremors  - chronic constipation/loose stools, bloating, GERD  - brain fog/memory loss  - had a period of weight loss, but since regained    Recent increased urination with little output x 1 month. Denies burning with urination.   Started jardiance for diabetes within last few weeks/months. Switched from metformin.     Following with cardiology/electrophysiology; a fib.  Following with neurology neuropathy/falls/memory loss  Following with oncology/hematology for hx skin cancer, MGUS    Medical history: Hypothyroidism, type 2 diabetes, hyperlipidemia, GERD, IBS, fibromyalgia, neuropathy, SUPA using CPAP    Hypothyroidism:  Diagnosed around mid 60s.     Labs from   5/2/2025 TSH: 1.67  4/14/2025 TSH: 1.6, FT3: 3.3  3/20/2025 TSH: 0.118, FT4: 0.61, FT3: 3.1, TPO antibody: 11  3/14/2025 TSH: 0.073, FT4: 0.77 - decreased from 60 mg to 30 mg    - Current regimen includes: Thyroid 30 mg.  Previous medications: switched from NP thyroid to generic levothyroxine for few weeks then returned to NP thyroid; does not recall why.    Reports fair compliance with thyroid medication (missing once every 2 weeks; was missing more consistently when family not assisting with medication; estimates missing few times a week).    Takes it at the same time every day (4 am), on an empty stomach, and not with hot  beverages. Takes ppi about 1 hr later. Takes multivitamin in morning.     - Was taking biotin/hair skin nail supplement daily until about 1 month ago.   - Denies thyroid imaging.   - Denies family history of thyroid disease.   - Denies high iodine diet.     Type 2 diabetes:  Diagnosed 2000s    A1C 7.8% 3/6/2025  BG at home: 130s fasting    Current regimen includes: jardiance 10 mg   Has tried: metformin (taking since 2000s; switched to jardiance due to reduced kidney function)     - HLD: rosuvastatin 10 mg (started few months ago)    The following portions of the patient's history were reviewed and updated as appropriate: allergies, current medications, past family history, past medical history, past social history, past surgical history and problem list.    Past Medical History:   Diagnosis Date    Allergic 1962    Anemia 1966    Anxiety and depression     Asthma 2022    Atrial fibrillation November 2023    Bell palsy 2017.     11/2023    Twice    Bradycardia     Cancer 2019    Squamous cell nose    Cataract 2022    Colon polyp 2016    Coronary artery disease 2016    Bradycardia    COVID-19 vaccine series completed     Moderna x2, plus a booster    Diabetes mellitus     Diabetic polyneuropathy     Difficulty walking 2020    Disease of thyroid gland     Duodenal ulcer 07/07/2022    Ear piercing     Elevated cholesterol     Erosive gastropathy 07/07/2022    Esophagitis     Fatty liver 2010    Fibromyalgia     Fibromyalgia     Fibromyalgia, primary 2000    Gastritis     GERD (gastroesophageal reflux disease)     Head injury 1967    Concussion    Headache, tension-type 1966    History of colon polyps     History of nuclear stress test 2013    HL (hearing loss) 2020    Hearing aids 2023    Hyperlipidemia     Hypertension     Hypoglycemia     Hypothyroidism 2010    Iron deficiency     Irritable bowel syndrome 2015    Romulus grade C esophagitis 07/07/2022    Low back pain 1966    Lyme disease 2016    Memory loss 2015     Migraine 1970    Movement disorder     Obesity 1973    Pneumonia     Problems with swallowing     food and liquids    Rheumatoid arthritis     Seasonal allergies     Shingles 2016    Sleep apnea     no CPAP    Sleep apnea, obstructive 2000    Syncope     None since then    Tremor     Ulcer     Endoscopy recent    Ulcer of esophagus without bleeding 2022    Urinary frequency     Vision loss     Cataracts/floaters    Wears glasses     for reading     Past Surgical History:   Procedure Laterality Date    APPENDECTOMY          BREAST LUMPECTOMY WITH SENTINEL NODE BIOPSY AND AXILLARY NODE DISSECTION      -benign    BREAST SURGERY      Benign RT    CARDIAC CATHETERIZATION      - no stents     SECTION      COLONOSCOPY  2016    COLONOSCOPY N/A 01/15/2021    Procedure: COLONOSCOPY WITH BIOSPIES AND POLYPECTOMY;  Surgeon: Yuridia Vinson MD;  Location: [facility] ENDOSCOPY;  Service: Gastroenterology;  Laterality: N/A;    COLONOSCOPY W/ BIOPSIES      Dr. Cason    COSMETIC SURGERY      ENDOSCOPY  2016    Dr. Cason    ENDOSCOPY N/A 2022    Procedure: ESOPHAGOGASTRODUODENOSCOPY WITH BIOPSY;  Surgeon: Yuridia Vinson MD;  Location: [facility] ENDOSCOPY;  Service: Gastroenterology;  Laterality: N/A;    ENDOSCOPY N/A 2023    Procedure: ESOPHAGOGASTRODUODENOSCOPY, biopsy;  Surgeon: Yuridia Vinson MD;  Location: [facility] ENDOSCOPY;  Service: Gastroenterology;  Laterality: N/A;    HYSTERECTOMY          TONSILLECTOMY       at age 10    TUBAL ABDOMINAL LIGATION      UPPER GASTROINTESTINAL ENDOSCOPY      WISDOM TOOTH EXTRACTION        Family History   Problem Relation Age of Onset    Heart disease Mother     Skin cancer Mother     Anxiety disorder Mother     Hearing loss Mother     Hypertension Mother     Anemia Mother     Heart failure Mother          at 102.    Heart disease Father     Arthritis Father     Polymyalgia rheumatica Father      Skin cancer Father     Dementia Father     Neuropathy Father     Hearing loss Father     Hypertension Father     Vision loss Father     Cancer Father         Skin cancer    Obesity Father     Heart failure Father          at 97    Mental retardation Paternal Uncle         Downs Syndrome    Alcohol abuse Maternal Uncle     Cancer Maternal Uncle     Cancer Paternal Aunt     Obesity Paternal Aunt     Cancer Paternal Uncle     Diabetes Paternal Uncle     Obesity Paternal Uncle     Diabetes Brother     Hearing loss Brother     Heart disease Brother     Hypertension Brother     Obesity Brother     Heart failure Brother         Triple bi-pass going strong at 80    Hearing loss Brother     Vision loss Paternal Grandmother     Obesity Paternal Grandmother     Obesity Maternal Grandmother     Obesity Paternal Grandfather     Heart failure Paternal Grandfather          in his 60’s heart attack    Colon cancer Neg Hx     Inflammatory bowel disease Neg Hx     Breast cancer Neg Hx       Social History     Socioeconomic History    Marital status:    Tobacco Use    Smoking status: Never     Passive exposure: Never    Smokeless tobacco: Never   Vaping Use    Vaping status: Never Used   Substance and Sexual Activity    Alcohol use: Never    Drug use: Never    Sexual activity: Not Currently     Partners: Male     Birth control/protection: Post-menopausal, Tubal ligation, Birth control pill, Hysterectomy      No Known Allergies   Current Outpatient Medications on File Prior to Visit   Medication Sig Dispense Refill    acetaminophen (TYLENOL) 500 MG tablet Take 1 tablet by mouth Every 6 (Six) Hours As Needed for Mild Pain. Indications: Pain      Alcohol Swabs (Alcohol Prep) 70 % pads USE 2 ALCOHOL PADS PER DAY WHEN ADMINISTERING INSULIN OR TESTING BLOOD SUGAR *NEW PRESCRIPTION REQUEST* 200 each 01    apixaban (Eliquis) 5 MG tablet tablet TAKE ONE (1) TABLET BY MOUTH TWICE DAILY *NEW PRESCRIPTION REQUEST* 180 tablet 1     cyclobenzaprine (FLEXERIL) 10 MG tablet TAKE 1 TABLET BY MOUTH EVERY DAY *NEW PRESCRIPTION REQUEST* 90 tablet 1    dicyclomine (BENTYL) 20 MG tablet Take 1 tablet by mouth 3 (Three) Times a Day As Needed (lower abdominal pain and diarrhea). (Patient taking differently: Take 1 tablet by mouth As Needed (lower abdominal pain and diarrhea).) 30 tablet 1    DULoxetine (CYMBALTA) 60 MG capsule TAKE ONE (1) CAPSULE BY MOUTH TWICE DAILY *NEW PRESCRIPTION REQUEST* 180 capsule 1    empagliflozin (Jardiance) 10 MG tablet tablet TAKE 1 TABLET BY MOUTH EVERY DAY *NEW PRESCRIPTION REQUEST* 90 tablet 1    glucose blood (Accu-Chek Guide Test) test strip USE TO TEST BLOOD SUGAR TWICE DAILY *NEW PRESCRIPTION REQUEST* 200 each 1    glucose monitor monitoring kit Check glucose twice daily 1 each 0    loratadine (CLARITIN) 10 MG tablet TAKE 1 TABLET BY MOUTH EVERY DAY *NEW PRESCRIPTION REQUEST* 90 tablet 1    meclizine (ANTIVERT) 25 MG tablet TAKE 1 TABLET BY MOUTH EVERY 8 HOURS AS NEEDED *NEW PRESCRIPTION REQUEST* 90 tablet 1    metoprolol succinate XL (TOPROL-XL) 25 MG 24 hr tablet Take 1 tablet by mouth Daily. 90 tablet 3    Multiple Vitamins-Minerals (ONE-A-DAY WOMENS 50+ PO) Take  by mouth. Indications: Supplement      nystatin (MYCOSTATIN) 410417 UNIT/GM powder Apply  topically to the appropriate area as directed 2 (Two) Times a Day. 60 g 1    omeprazole (priLOSEC) 40 MG capsule TAKE ONE (1) CAPSULE BY MOUTH ONCE DAILY *NEW PRESCRIPTION REQUEST* 90 capsule 1    pregabalin (Lyrica) 100 MG capsule Take 1 capsule by mouth 2 (Two) Times a Day. 60 capsule 2    rosuvastatin (CRESTOR) 10 MG tablet       traMADol (ULTRAM) 50 MG tablet Take 1-2 tablets by mouth Every 8 (Eight) Hours As Needed for Moderate Pain. 90 tablet 1    traZODone (DESYREL) 100 MG tablet Take 0.5 tablets by mouth every night at bedtime. Indications: Anxiety Disorder, Trouble Sleeping      vitamin D3 125 MCG (5000 UT) capsule capsule Take 1 capsule by mouth Daily.  "Indications: Supplement       No current facility-administered medications on file prior to visit.        Review of Systems   Constitutional:  Positive for diaphoresis and fatigue. Negative for activity change, appetite change, unexpected weight gain and unexpected weight loss.   HENT:  Negative for swollen glands, trouble swallowing and voice change.         Dry mouth    Respiratory:  Negative for shortness of breath.    Cardiovascular:  Positive for chest pain and palpitations. Negative for leg swelling.   Gastrointestinal:  Positive for abdominal distention and constipation. Negative for abdominal pain and diarrhea.   Endocrine: Positive for cold intolerance and heat intolerance.   Musculoskeletal:  Positive for arthralgias and myalgias.   Skin:  Positive for rash. Negative for dry skin.   Neurological:  Positive for dizziness, tremors, light-headedness and numbness. Negative for confusion.   Psychiatric/Behavioral:  Positive for sleep disturbance. Negative for depressed mood and stress. The patient is not nervous/anxious.         /71 (BP Location: Right arm, Patient Position: Sitting, Cuff Size: Adult)   Pulse 74   Ht 162.6 cm (64\")   Wt 98 kg (216 lb)   SpO2 98%   BMI 37.08 kg/m²      Physical Exam  Constitutional:       Appearance: Normal appearance. She is obese.   Neck:      Thyroid: No thyroid mass, thyromegaly or thyroid tenderness.   Cardiovascular:      Rate and Rhythm: Normal rate.   Pulmonary:      Effort: Pulmonary effort is normal.   Musculoskeletal:      Right lower leg: Edema (non-pitting) present.      Left lower leg: Edema (non-pitting) present.      Comments: Slow, antalgic gait, walker for ambulation.    Skin:     General: Skin is warm and dry.   Neurological:      General: No focal deficit present.      Mental Status: She is alert and oriented to person, place, and time.   Psychiatric:         Mood and Affect: Mood normal.         Behavior: Behavior normal.         LABS AND " "IMAGING    CMP  Lab Results   Component Value Date    GLUCOSE 155 (H) 05/02/2025    BUN 17 05/02/2025    CREATININE 1.22 (H) 05/02/2025    EGFR 46.1 (L) 05/02/2025    BCR 13.9 05/02/2025    K 4.1 05/02/2025    CO2 26.0 05/02/2025    CALCIUM 10.2 05/02/2025    ALBUMIN 4.4 05/02/2025    AST 21 05/02/2025    ALT 14 05/02/2025        CBC w/DIFF   Lab Results   Component Value Date    WBC 5.35 05/02/2025    RBC 4.18 05/02/2025    HGB 12.4 05/02/2025    HCT 38.3 05/02/2025    MCV 91.6 05/02/2025    MCH 29.7 05/02/2025    MCHC 32.4 05/02/2025    RDW 13.6 05/02/2025    RDWSD 45.9 05/02/2025    MPV 8.6 05/02/2025     05/02/2025    NEUTRORELPCT 36.1 (L) 04/14/2025    LYMPHORELPCT 52.7 (H) 04/14/2025    MONORELPCT 8.0 04/14/2025    EOSRELPCT 2.4 04/14/2025    BASORELPCT 0.4 04/14/2025    AUTOIGPER 0.5 03/14/2025    NEUTROABS 1.07 (L) 05/02/2025    LYMPHSABS 2.38 04/14/2025    MONOSABS 0.36 04/14/2025    EOSABS 0.05 05/02/2025    BASOSABS 0.02 04/14/2025    AUTOIGNUM 0.03 03/14/2025    NRBC 0.0 04/14/2025       TSH  Lab Results   Component Value Date    TSH 1.670 05/02/2025    TSH 1.610 04/14/2025    TSH 0.118 (L) 03/20/2025       T4  Lab Results   Component Value Date    FREET4 0.61 (L) 03/20/2025    FREET4 0.77 (L) 03/14/2025     No results found for: \"S0GEDZX\"    T3  Lab Results   Component Value Date    T3FREE 3.3 04/14/2025    T3FREE 3.1 03/20/2025     No results found for: \"F0OZEWS\"    TRAb  No results found for: \"TSURCPAB\"    TPO  Lab Results   Component Value Date    THYROIDAB 11 03/20/2025       No valid procedures specified.    Assessment and Plan    Diagnoses and all orders for this visit:    1. Acquired hypothyroidism (Primary)  Assessment & Plan:  History of TSH suppression; possible contributor to symptoms.  Also taking NP thyroid which has higher ratio of T3 to T4 which can have more potential for palpitations/a.fib, tremors, muscle/bone loss; discussed switching back to T4 therapy; given hx generic " levothyroxine use will send for brand name; agreeable to trying this  Rx: Switch from NP thyroid 30 mg to Levoxyl 50 mcg  Repeat TFT in 6 weeks for continued monitoring.  Discussed taking first thing in morning on empty stomach without hot beverages.   Avoid supplements and heartburn reducing medications for at least 4 hours.   Discussed biotin supplement can effect the accuracy of thyroid labs; stop 3-7 days prior if taking.     Discussed symptoms hypothyroidism/hyperthyroidism.  Discussed risk for complications with discontinuing medication/uncontrolled thyroid levels.     Orders:  -     TSH; Future  -     T4, Free; Future  -     T3; Future  -     Levoxyl 50 MCG tablet; Take 1 tablet by mouth Every Morning.  Dispense: 30 tablet; Refill: 2  -     TSH  -     T4, Free  -     T3  -     POCT urinalysis dipstick, automated    2. Type 2 diabetes mellitus with hyperglycemia, without long-term current use of insulin  Assessment & Plan:  Not controlled on labs from 3/6/2025.  Recent switched from metformin to jardiance; discussed possible contributor to polyuria.  Consider additional DPP4i/GLP1i as needed next OV.      Orders:  -     Urinalysis With Culture If Indicated - Urine, Clean Catch  -     POCT urinalysis dipstick, automated  -     [place] Urine Culture Tube - Urine, Clean Catch  -     Urinalysis, Microscopic Only - Urine, Clean Catch    3. Polyuria  Assessment & Plan:  Recent started jardiance for diabetes.  Discussed risk for UTI/yeast infections with use.  UA for further evaluation.  Continue follow-up with PCP if continues.     Orders:  -     Urinalysis With Culture If Indicated - Urine, Clean Catch  -     [place] Urine Culture Tube - Urine, Clean Catch  -     Urinalysis, Microscopic Only - Urine, Clean Catch         Return in about 3 months (around 8/27/2025) for follow-up thyroid disease. The patient was instructed to contact the clinic with any interval questions or concerns.    Electronically signed by: Gabriella  SIERRA Basilio   Endocrinology    Please note that portions of this note were completed with a voice recognition program.

## 2025-05-27 NOTE — PATIENT INSTRUCTIONS
Thyroid medication changes  - STOP Thyroid 30 mg    - Start Levoxyl (levothyroxine) 50 mcg.  Take first thing in morning and avoid omeprazole/heartburn medications and supplements like multivitamin for at least 4 hours.     Stop biotin/hair skin nail supplements 3-7 days prior to repeat thyroid labs.    Repeat labs 7/8/2025; can repeat sooner if concerns for symptoms.

## 2025-05-27 NOTE — ASSESSMENT & PLAN NOTE
History of TSH suppression; possible contributor to symptoms.  Also taking NP thyroid which has higher ratio of T3 to T4 which can have more potential for palpitations/a.fib, tremors, muscle/bone loss; discussed switching back to T4 therapy; given hx generic levothyroxine use will send for brand name; agreeable to trying this  Rx: Switch from NP thyroid 30 mg to Levoxyl 50 mcg  Repeat TFT in 6 weeks for continued monitoring.  Discussed taking first thing in morning on empty stomach without hot beverages.   Avoid supplements and heartburn reducing medications for at least 4 hours.   Discussed biotin supplement can effect the accuracy of thyroid labs; stop 3-7 days prior if taking.     Discussed symptoms hypothyroidism/hyperthyroidism.  Discussed risk for complications with discontinuing medication/uncontrolled thyroid levels.

## 2025-05-28 ENCOUNTER — PATIENT ROUNDING (BHMG ONLY) (OUTPATIENT)
Dept: ENDOCRINOLOGY | Facility: CLINIC | Age: 75
End: 2025-05-28
Payer: MEDICARE

## 2025-05-28 ENCOUNTER — PRIOR AUTHORIZATION (OUTPATIENT)
Dept: FAMILY MEDICINE CLINIC | Facility: CLINIC | Age: 75
End: 2025-05-28
Payer: MEDICARE

## 2025-05-28 NOTE — ASSESSMENT & PLAN NOTE
Recent started jardiance for diabetes.  Discussed risk for UTI/yeast infections with use.  UA for further evaluation.  Continue follow-up with PCP if continues.

## 2025-05-28 NOTE — PROGRESS NOTES
A [employer] message has been sent to the patient for patient rounding with [facility]

## 2025-05-28 NOTE — TELEPHONE ENCOUNTER
Prior Authorization has been started on Cover My Meds for Cyclobenzaprine    Waiting on response from insurance       Key: [id number]

## 2025-05-28 NOTE — ASSESSMENT & PLAN NOTE
Not controlled on labs from 3/6/2025.  Recent switched from metformin to jardiance; discussed possible contributor to polyuria.  Consider additional DPP4i/GLP1i as needed next OV.

## 2025-05-29 ENCOUNTER — TELEPHONE (OUTPATIENT)
Dept: ENDOCRINOLOGY | Facility: CLINIC | Age: 75
End: 2025-05-29
Payer: MEDICARE

## 2025-05-29 DIAGNOSIS — E03.9 ACQUIRED HYPOTHYROIDISM: ICD-10-CM

## 2025-05-29 DIAGNOSIS — E11.40 TYPE 2 DIABETES MELLITUS WITH DIABETIC NEUROPATHY, WITHOUT LONG-TERM CURRENT USE OF INSULIN: Primary | ICD-10-CM

## 2025-05-29 RX ORDER — LEVOTHYROXINE SODIUM 50 UG/1
50 TABLET ORAL
Qty: 30 TABLET | Refills: 2 | Status: SHIPPED | OUTPATIENT
Start: 2025-05-29

## 2025-05-29 RX ORDER — ALCOHOL ANTISEPTIC PADS
PADS, MEDICATED (EA) TOPICAL
Qty: 100 EACH | Refills: 5 | Status: SHIPPED | OUTPATIENT
Start: 2025-05-29

## 2025-05-29 NOTE — TELEPHONE ENCOUNTER
Spoke to pt-she does want the refill send to [facility] Refill Note  Requested Prescriptions     Pending Prescriptions Disp Refills    Levoxyl 50 MCG tablet 90 tablet 2     Sig: Take 1 tablet by mouth Every Morning.        Last office visit with prescribing clinician: 5/27/2025      Next office visit with prescribing clinician: 10/14/2025       Selene Clinton (Jodi)  05/29/25, 13:31 EDT

## 2025-05-29 NOTE — TELEPHONE ENCOUNTER
Valerie called from the pharmacy, [facility], requesting the medication, 50mcg of Levothyroxine to be sent to their pharmacy for the patient

## 2025-05-30 ENCOUNTER — PRIOR AUTHORIZATION (OUTPATIENT)
Dept: FAMILY MEDICINE CLINIC | Facility: CLINIC | Age: 75
End: 2025-05-30
Payer: MEDICARE

## 2025-05-30 ENCOUNTER — OFFICE VISIT (OUTPATIENT)
Dept: FAMILY MEDICINE CLINIC | Facility: CLINIC | Age: 75
End: 2025-05-30
Payer: MEDICARE

## 2025-05-30 VITALS
HEART RATE: 83 BPM | DIASTOLIC BLOOD PRESSURE: 86 MMHG | WEIGHT: 216 LBS | SYSTOLIC BLOOD PRESSURE: 112 MMHG | OXYGEN SATURATION: 93 % | HEIGHT: 64 IN | BODY MASS INDEX: 36.88 KG/M2 | RESPIRATION RATE: 16 BRPM

## 2025-05-30 DIAGNOSIS — E03.9 HYPOTHYROIDISM, UNSPECIFIED TYPE: ICD-10-CM

## 2025-05-30 DIAGNOSIS — E11.40 TYPE 2 DIABETES MELLITUS WITH DIABETIC NEUROPATHY, WITHOUT LONG-TERM CURRENT USE OF INSULIN: ICD-10-CM

## 2025-05-30 DIAGNOSIS — M79.7 FIBROMYALGIA: Primary | ICD-10-CM

## 2025-05-30 RX ORDER — CELECOXIB 200 MG/1
1 CAPSULE ORAL DAILY
COMMUNITY
Start: 2025-05-27

## 2025-05-30 RX ORDER — EMPAGLIFLOZIN 10 MG/1
10 TABLET, FILM COATED ORAL DAILY
Qty: 30 TABLET | Refills: 0 | Status: SHIPPED | OUTPATIENT
Start: 2025-05-30

## 2025-05-30 NOTE — TELEPHONE ENCOUNTER
Prior Authorization has been started on Cover My Meds for Lancet 30 G     Waiting on response from insurance       Key:[id number]

## 2025-05-30 NOTE — PROGRESS NOTES
Subjective     Chief Complaint:    Chief Complaint   Patient presents with    Diabetes       History of Present Illness:   6 week f/u fatigue  She really feels like fibro is playing a major part in her symptoms  She continues cymbalta, tramadol, lyrica  Recently saw endo and was transitioned to levothyroxine  DM, jardiance  No further syncopal episodes  CTA reviewed, small nodule noted    Review of Systems  Gen- No fevers, chills  CV- No chest pain, palpitations  Resp- No cough, dyspnea  GI- No N/V/D, abd pain  Neuro-No dizziness, headaches      I have reviewed and/or updated the patient's past medical, surgical, family, social history and problem list as appropriate.     Medications:    Current Outpatient Medications:     acetaminophen (TYLENOL) 500 MG tablet, Take 1 tablet by mouth Every 6 (Six) Hours As Needed for Mild Pain. Indications: Pain, Disp: , Rfl:     Alcohol Swabs (Alcohol Prep) 70 % pads, USE 2 ALCOHOL PADS PER DAY WHEN ADMINISTERING INSULIN OR TESTING BLOOD SUGAR *NEW PRESCRIPTION REQUEST*, Disp: 200 each, Rfl: 01    apixaban (Eliquis) 5 MG tablet tablet, TAKE ONE (1) TABLET BY MOUTH TWICE DAILY *NEW PRESCRIPTION REQUEST*, Disp: 180 tablet, Rfl: 1    celecoxib (CeleBREX) 200 MG capsule, Take 1 capsule by mouth Daily., Disp: , Rfl:     cyclobenzaprine (FLEXERIL) 10 MG tablet, TAKE 1 TABLET BY MOUTH EVERY DAY *NEW PRESCRIPTION REQUEST*, Disp: 90 tablet, Rfl: 1    dicyclomine (BENTYL) 20 MG tablet, Take 1 tablet by mouth 3 (Three) Times a Day As Needed (lower abdominal pain and diarrhea). (Patient taking differently: Take 1 tablet by mouth As Needed (lower abdominal pain and diarrhea).), Disp: 30 tablet, Rfl: 1    DULoxetine (CYMBALTA) 60 MG capsule, TAKE ONE (1) CAPSULE BY MOUTH TWICE DAILY *NEW PRESCRIPTION REQUEST*, Disp: 180 capsule, Rfl: 1    empagliflozin (Jardiance) 10 MG tablet tablet, TAKE 1 TABLET BY MOUTH EVERY DAY *NEW PRESCRIPTION REQUEST*, Disp: 90 tablet, Rfl: 1    estradiol  (ESTRACE) 0.5 MG tablet, TAKE ONE TABLET BY MOUTH EVERY DAY, Disp: 90 tablet, Rfl: 2    glucose blood (Accu-Chek Guide Test) test strip, USE TO TEST BLOOD SUGAR TWICE DAILY *NEW PRESCRIPTION REQUEST*, Disp: 200 each, Rfl: 1    glucose monitor monitoring kit, Check glucose twice daily, Disp: 1 each, Rfl: 0    Lancets 30G misc, Check glucose twice daily, Disp: 100 each, Rfl: 5    Levoxyl 50 MCG tablet, Take 1 tablet by mouth Every Morning., Disp: 30 tablet, Rfl: 2    loratadine (CLARITIN) 10 MG tablet, TAKE 1 TABLET BY MOUTH EVERY DAY *NEW PRESCRIPTION REQUEST*, Disp: 90 tablet, Rfl: 1    meclizine (ANTIVERT) 25 MG tablet, TAKE 1 TABLET BY MOUTH EVERY 8 HOURS AS NEEDED *NEW PRESCRIPTION REQUEST*, Disp: 90 tablet, Rfl: 1    metoprolol succinate XL (TOPROL-XL) 25 MG 24 hr tablet, Take 1 tablet by mouth Daily., Disp: 90 tablet, Rfl: 3    Multiple Vitamins-Minerals (ONE-A-DAY WOMENS 50+ PO), Take  by mouth. Indications: Supplement, Disp: , Rfl:     nystatin (MYCOSTATIN) 989639 UNIT/GM cream, APPLY TO AFFECTED AREA(S) TOPICALLY TWICE DAILY AS DIRECTED, Disp: 30 g, Rfl: 11    nystatin (MYCOSTATIN) [id number] UNIT/GM powder, Apply  topically to the appropriate area as directed 2 (Two) Times a Day., Disp: 60 g, Rfl: 1    omeprazole (priLOSEC) 40 MG capsule, TAKE ONE (1) CAPSULE BY MOUTH ONCE DAILY *NEW PRESCRIPTION REQUEST*, Disp: 90 capsule, Rfl: 1    pregabalin (Lyrica) 100 MG capsule, Take 1 capsule by mouth 2 (Two) Times a Day., Disp: 60 capsule, Rfl: 2    rosuvastatin (CRESTOR) 10 MG tablet, , Disp: , Rfl:     traMADol (ULTRAM) 50 MG tablet, Take 1-2 tablets by mouth Every 8 (Eight) Hours As Needed for Moderate Pain., Disp: 90 tablet, Rfl: 1    traZODone (DESYREL) 100 MG tablet, Take 0.5 tablets by mouth every night at bedtime. Indications: Anxiety Disorder, Trouble Sleeping, Disp: , Rfl:     vitamin D3 125 MCG (5000 UT) capsule capsule, Take 1 capsule by mouth Daily. Indications: Supplement, Disp: , Rfl:     Allergies:  No  "Known Allergies    Objective     Vital Signs:   Vitals:    05/30/25 1152   BP: 112/86   Pulse: 83   Resp: 16   SpO2: 93%   Weight: 98 kg (216 lb)   Height: 162.6 cm (64\")     Body mass index is 37.08 kg/m².    Physical Exam:    Physical Exam  Vitals and nursing note reviewed.   Constitutional:       Appearance: She is well-developed.   HENT:      Head: Normocephalic and atraumatic.   Eyes:      Pupils: Pupils are equal, round, and reactive to light.   Cardiovascular:      Rate and Rhythm: Normal rate and regular rhythm.      Heart sounds: Normal heart sounds.   Pulmonary:      Effort: Pulmonary effort is normal.      Breath sounds: Normal breath sounds.   Abdominal:      General: Bowel sounds are normal. There is no distension.      Palpations: Abdomen is soft.      Tenderness: There is no abdominal tenderness.   Musculoskeletal:      Cervical back: Neck supple.   Skin:     General: Skin is warm and dry.   Neurological:      General: No focal deficit present.      Mental Status: She is alert and oriented to person, place, and time.      Motor: Weakness present.      Gait: Gait abnormal.   Psychiatric:         Mood and Affect: Mood normal.         Behavior: Behavior normal.         Assessment / Plan     Assessment/Plan:   Problem List Items Addressed This Visit       Hypothyroidism    Relevant Medications    metoprolol succinate XL (TOPROL-XL) 25 MG 24 hr tablet    Levoxyl 50 MCG tablet    Fibromyalgia - Primary    Relevant Medications    pregabalin (Lyrica) 100 MG capsule     Other Visit Diagnoses         Type 2 diabetes mellitus with diabetic neuropathy, without long-term current use of insulin              -- continue jardiance  -- discussed fibro, encouraged her to look into fibro/anti inflammatory diet, exercise, can consider nortriptyline but will give her thyroid medication adjustment more time  -- keep f/u with specialist      Discussed plan of care in detail with pt today; pt verb understanding and " agrees.    Follow up:  2 months     Electronically signed by AUSTIN Hickey   05/30/2025 11:57 EDT      Please note that portions of this note were completed with a voice recognition program.

## 2025-06-09 RX ORDER — THYROID, PORCINE 30 MG/1
30 TABLET ORAL DAILY
Qty: 30 TABLET | Refills: 1 | Status: SHIPPED | OUTPATIENT
Start: 2025-06-09

## 2025-06-13 DIAGNOSIS — M79.7 FIBROMYALGIA: ICD-10-CM

## 2025-06-13 DIAGNOSIS — G62.9 PERIPHERAL POLYNEUROPATHY: ICD-10-CM

## 2025-06-13 DIAGNOSIS — M54.50 CHRONIC BILATERAL LOW BACK PAIN WITHOUT SCIATICA: ICD-10-CM

## 2025-06-13 DIAGNOSIS — G89.29 CHRONIC BILATERAL LOW BACK PAIN WITHOUT SCIATICA: ICD-10-CM

## 2025-06-13 RX ORDER — PREGABALIN 100 MG/1
100 CAPSULE ORAL 2 TIMES DAILY
Qty: 60 CAPSULE | Refills: 2 | Status: SHIPPED | OUTPATIENT
Start: 2025-06-13

## 2025-06-13 NOTE — TELEPHONE ENCOUNTER
Rx Refill Note  Requested Prescriptions     Pending Prescriptions Disp Refills    pregabalin (Lyrica) 100 MG capsule 60 capsule 2     Sig: Take 1 capsule by mouth 2 (Two) Times a Day.      Last office visit with prescribing clinician: 5/30/2025   Last telemedicine visit with prescribing clinician: Visit date not found   Next office visit with prescribing clinician: 7/30/2025                         Would you like a call back once the refill request has been completed: [] Yes [] No    If the office needs to give you a call back, can they leave a voicemail: [] Yes [] No    Carmelina Rondon MA  06/13/25, 11:51 EDT

## 2025-06-13 NOTE — TELEPHONE ENCOUNTER
Caller: [facility], [place] - [address] [phone] [place], [place] [phone] [place]    Relationship: Pharmacy      Requested Prescriptions:   Requested Prescriptions     Pending Prescriptions Disp Refills    pregabalin (Lyrica) 100 MG capsule 60 capsule 2     Sig: Take 1 capsule by mouth 2 (Two) Times a Day.        Pharmacy where request should be sent: [facility], [place] - [address] [phone] [place], [place] [phone] [place]     Last office visit with prescribing clinician: 5/30/2025   Last telemedicine visit with prescribing clinician: Visit date not found   Next office visit with prescribing clinician: 7/30/2025     Additional details provided by patient: PATIENT NEEDS REFILL     Does the patient have less than a 3 day supply:  [] Yes  [x] No    Would you like a call back once the refill request has been completed: [] Yes [x] No    If the office needs to give you a call back, can they leave a voicemail: [] Yes [x] No    Luther Young Rep   06/13/25 11:42 EDT

## 2025-06-16 ENCOUNTER — PATIENT OUTREACH (OUTPATIENT)
Age: 75
End: 2025-06-16
Payer: MEDICARE

## 2025-06-16 NOTE — OUTREACH NOTE
Patient Outreach    SW F/u with pt re her resource engagement. Pt has forgotten to apply for Extra Help again. SW forwarded the prior email sent to her with the link to complete that application. Pt also given number to Medicaid to check status of prior application. Pt reports she is feeling much better with fibromyalgia not flaring as much. Pt stated she also plans to call [facility] as well. SW will F/u in a couple of weeks.     Jose COURTNEY -   Ambulatory Case Management    6/16/2025, 13:35 EDT

## 2025-06-21 ENCOUNTER — READMISSION MANAGEMENT (OUTPATIENT)
Dept: CALL CENTER | Facility: HOSPITAL | Age: 75
End: 2025-06-21
Payer: MEDICARE

## 2025-06-21 NOTE — OUTREACH NOTE
Prep Survey      Flowsheet Row Responses   [facility] facility patient discharged from? Non-BH   Is LACE score < 7 ? Non-BH Discharge   Eligibility Not Eligible   What are the reasons patient is not eligible? Other  [no recent hospitalization]   Does the patient have one of the following disease processes/diagnoses(primary or secondary)? Other   Prep survey completed? Yes            Jaymie MARTINEZ - Registered Nurse              Jaymie MARTINEZ - Registered Nurse

## 2025-06-24 ENCOUNTER — PATIENT OUTREACH (OUTPATIENT)
Age: 75
End: 2025-06-24
Payer: MEDICARE

## 2025-06-24 NOTE — OUTREACH NOTE
SW attempted contact with UTRx1. SW will attempt again in a couple of business days.     Jose COURTNEY -   Ambulatory Case Management    6/24/2025, 15:50 EDT

## 2025-06-27 ENCOUNTER — PATIENT OUTREACH (OUTPATIENT)
Age: 75
End: 2025-06-27
Payer: MEDICARE

## 2025-06-27 DIAGNOSIS — M54.50 CHRONIC BILATERAL LOW BACK PAIN WITHOUT SCIATICA: ICD-10-CM

## 2025-06-27 DIAGNOSIS — G89.29 CHRONIC BILATERAL LOW BACK PAIN WITHOUT SCIATICA: ICD-10-CM

## 2025-06-27 RX ORDER — TRAMADOL HYDROCHLORIDE 50 MG/1
50-100 TABLET ORAL EVERY 8 HOURS PRN
Qty: 90 TABLET | Refills: 1 | Status: SHIPPED | OUTPATIENT
Start: 2025-06-27

## 2025-06-27 RX ORDER — TRAMADOL HYDROCHLORIDE 50 MG/1
50-100 TABLET ORAL EVERY 8 HOURS PRN
Qty: 90 TABLET | Refills: 1 | Status: CANCELLED | OUTPATIENT
Start: 2025-06-27

## 2025-06-27 NOTE — TELEPHONE ENCOUNTER
Rx Refill Note  Requested Prescriptions     Pending Prescriptions Disp Refills    traMADol (ULTRAM) 50 MG tablet [Pharmacy Med Name: tramadol 50 mg tablet] 90 tablet 1     Sig: TAKE 1 OR 2 TABLETS BY MOUTH EVERY 8 HOURS AS NEEDED FOR PAIN      Last office visit with prescribing clinician: 5/30/2025   Last telemedicine visit with prescribing clinician: Visit date not found   Next office visit with prescribing clinician: 7/30/2025                         Would you like a call back once the refill request has been completed: [] Yes [] No    If the office needs to give you a call back, can they leave a voicemail: [] Yes [] No    Carmelina Rondon MA  06/27/25, 10:48 EDT

## 2025-06-27 NOTE — TELEPHONE ENCOUNTER
Rx Refill Note  Requested Prescriptions     Pending Prescriptions Disp Refills    traMADol (ULTRAM) 50 MG tablet 90 tablet 1     Sig: Take 1-2 tablets by mouth Every 8 (Eight) Hours As Needed for Moderate Pain.      Last office visit with prescribing clinician: 5/30/2025   Last telemedicine visit with prescribing clinician: Visit date not found   Next office visit with prescribing clinician: 6/27/2025                         Would you like a call back once the refill request has been completed: [] Yes [] No    If the office needs to give you a call back, can they leave a voicemail: [] Yes [] No    Carmelina Rondon MA  06/27/25, 11:35 EDT

## 2025-06-27 NOTE — OUTREACH NOTE
SW attempted to contact pt a second time, and scheduled a third call for one week out. SW will attempt again in a week.     Jose COURTNEY -   Ambulatory Case Management    6/27/2025, 12:53 EDT

## 2025-07-02 ENCOUNTER — OFFICE VISIT (OUTPATIENT)
Dept: NEUROLOGY | Facility: CLINIC | Age: 75
End: 2025-07-02
Payer: MEDICARE

## 2025-07-02 ENCOUNTER — PATIENT OUTREACH (OUTPATIENT)
Age: 75
End: 2025-07-02
Payer: MEDICARE

## 2025-07-02 ENCOUNTER — LAB (OUTPATIENT)
Dept: LAB | Facility: HOSPITAL | Age: 75
End: 2025-07-02
Payer: MEDICARE

## 2025-07-02 VITALS
DIASTOLIC BLOOD PRESSURE: 76 MMHG | HEIGHT: 64 IN | WEIGHT: 217.4 LBS | SYSTOLIC BLOOD PRESSURE: 122 MMHG | RESPIRATION RATE: 14 BRPM | BODY MASS INDEX: 37.11 KG/M2 | HEART RATE: 110 BPM | OXYGEN SATURATION: 98 % | TEMPERATURE: 98.9 F

## 2025-07-02 DIAGNOSIS — R41.89 SUBJECTIVE MEMORY COMPLAINTS: ICD-10-CM

## 2025-07-02 DIAGNOSIS — R41.89 SUBJECTIVE MEMORY COMPLAINTS: Primary | ICD-10-CM

## 2025-07-02 DIAGNOSIS — G47.33 OSA (OBSTRUCTIVE SLEEP APNEA): ICD-10-CM

## 2025-07-02 PROCEDURE — 83520 IMMUNOASSAY QUANT NOS NONAB: CPT

## 2025-07-02 PROCEDURE — 36415 COLL VENOUS BLD VENIPUNCTURE: CPT

## 2025-07-02 NOTE — OUTREACH NOTE
UTRx3. SW has attempted to contact pt with UTRx3. SW will D/c pt due to UTR, if no response within a week. Pt may contact SW and received services, should they be needed in the future.     Jose COURTNEY -   Ambulatory Case Management    7/2/2025, 13:30 EDT

## 2025-07-02 NOTE — PROGRESS NOTES
Follow Up Office Visit      Patient Name: Harley Rodriges  : 1950   MRN: [id number]     Chief Complaint:    Chief Complaint   Patient presents with    Follow-up     Memory; pt reports that her memory has been worse.         History of Present Illness: Harley Rodriges is a 75 y.o. female who is here today to follow up with neurology for subjective memory complaints. She was last seen in clinic  (Denita).     She is currently undergoing an syncope workup with Cardiology (Palma) and EPS (Franki); she is wearing a Holter Monitor today. Denies seizure activity     She has resumed AutoPap therapy. DME [employer]. This is managed by Pulmonary (Shahnaz/Franki); she reports good compliance and tolerance. She feels she has the correct mask and this was corrected .    Reports she has been struggling with a Fibromyalgia flare up and has been in the bed resting; fibromyalgia gives her brain fog and exacerbates her. Needs assistance with driving. Has family group chat/text to help keep her organized. Ambulates with Rolator in the community; does not use rollator at home. She continues to struggle with word finding and slowed processing speed. She does not feel things have worsened with her memory. Got a pet dog, Zeva- this has helped with her mood  Got rid of bad roommate and this has also helped with her mood. Self ADLs. She is managing her household cooking, cleaning and shopping. She does not want to take memory medications.+ FMH Father- AD    Recent Imaging:      EEG 10/24 - normal  Home Sleep Study  + AHI 36.6 with lowest desaturation 80%  Neuropsychological Evaluation  + low average to high average visual perception, visual constructive ability, learning, memory, processing speed, auditory attention, verbal fluency, and word retrieval.  Her global cognitive ability was average.  Neurocognitive testing is not consistent with a neurodegenerative condition.  Polysomnography  + AHI  3.9/hr with lowest Sp02 80%   MRI Brain W/WO 11/23 + Small focus of susceptibility artifact in the right occipital lobe subdural space is compatible with punctate focus of calcification on CT. Small filling defects are visualized in the right transverse sinus, small blood clots not excluded.  CTA Neck 11/23 - noncontributory   CTA Head 11/23 -noncontributory   CT Head WO CVA Protocol - noncontributory      Pertinent Medical History: Obesity, GERD, IBS-D, RAO, anemia, MGUS, DM PN, SUPA (untreated), Concussion 1965 from Fall, Lyme Diease, fibromyalgia    Subjective      Review of Systems:   Review of Systems   Constitutional: Negative.    HENT: Negative.     Eyes: Negative.    Respiratory: Negative.     Cardiovascular: Negative.    Gastrointestinal: Negative.    Endocrine: Negative.    Genitourinary: Negative.    Musculoskeletal: Negative.    Skin: Negative.    Allergic/Immunologic: Negative.    Neurological:  Positive for syncope and memory problem.   Hematological: Negative.    Psychiatric/Behavioral: Negative.     All other systems reviewed and are negative.      I have reviewed and the following portions of the patient's history were updated as appropriate: past family history, past medical history, past social history, past surgical history and problem list.    Medications:     Current Outpatient Medications:     acetaminophen (TYLENOL) 500 MG tablet, Take 1 tablet by mouth Every 6 (Six) Hours As Needed for Mild Pain. Indications: Pain, Disp: , Rfl:     Alcohol Swabs (Alcohol Prep) 70 % pads, USE 2 ALCOHOL PADS PER DAY WHEN ADMINISTERING INSULIN OR TESTING BLOOD SUGAR *NEW PRESCRIPTION REQUEST*, Disp: 200 each, Rfl: 01    apixaban (Eliquis) 5 MG tablet tablet, TAKE ONE (1) TABLET BY MOUTH TWICE DAILY *NEW PRESCRIPTION REQUEST*, Disp: 180 tablet, Rfl: 1    celecoxib (CeleBREX) 200 MG capsule, Take 1 capsule by mouth Daily., Disp: , Rfl:     cyclobenzaprine (FLEXERIL) 10 MG tablet, TAKE 1 TABLET BY MOUTH EVERY DAY  *NEW PRESCRIPTION REQUEST*, Disp: 90 tablet, Rfl: 1    dicyclomine (BENTYL) 20 MG tablet, Take 1 tablet by mouth 3 (Three) Times a Day As Needed (lower abdominal pain and diarrhea). (Patient taking differently: Take 1 tablet by mouth As Needed (lower abdominal pain and diarrhea).), Disp: 30 tablet, Rfl: 1    DULoxetine (CYMBALTA) 60 MG capsule, TAKE ONE (1) CAPSULE BY MOUTH TWICE DAILY *NEW PRESCRIPTION REQUEST*, Disp: 180 capsule, Rfl: 1    estradiol (ESTRACE) 0.5 MG tablet, TAKE ONE TABLET BY MOUTH EVERY DAY, Disp: 90 tablet, Rfl: 2    glucose blood (Accu-Chek Guide Test) test strip, USE TO TEST BLOOD SUGAR TWICE DAILY *NEW PRESCRIPTION REQUEST*, Disp: 200 each, Rfl: 1    glucose monitor monitoring kit, Check glucose twice daily, Disp: 1 each, Rfl: 0    Jardiance 10 MG tablet tablet, TAKE ONE TABLET BY MOUTH EVERY DAY, Disp: 30 tablet, Rfl: 0    Lancets 30G misc, Check glucose twice daily, Disp: 100 each, Rfl: 5    Levoxyl 50 MCG tablet, Take 1 tablet by mouth Every Morning., Disp: 30 tablet, Rfl: 2    loratadine (CLARITIN) 10 MG tablet, TAKE 1 TABLET BY MOUTH EVERY DAY *NEW PRESCRIPTION REQUEST*, Disp: 90 tablet, Rfl: 1    meclizine (ANTIVERT) 25 MG tablet, TAKE 1 TABLET BY MOUTH EVERY 8 HOURS AS NEEDED *NEW PRESCRIPTION REQUEST*, Disp: 90 tablet, Rfl: 1    metoprolol succinate XL (TOPROL-XL) 25 MG 24 hr tablet, Take 1 tablet by mouth Daily., Disp: 90 tablet, Rfl: 3    Multiple Vitamins-Minerals (ONE-A-DAY WOMENS 50+ PO), Take  by mouth. Indications: Supplement, Disp: , Rfl:     nystatin (MYCOSTATIN) [id number] UNIT/GM cream, APPLY TO AFFECTED AREA(S) TOPICALLY TWICE DAILY AS DIRECTED, Disp: 30 g, Rfl: 11    omeprazole (priLOSEC) 40 MG capsule, TAKE ONE (1) CAPSULE BY MOUTH ONCE DAILY *NEW PRESCRIPTION REQUEST*, Disp: 90 capsule, Rfl: 1    pregabalin (Lyrica) 100 MG capsule, Take 1 capsule by mouth 2 (Two) Times a Day., Disp: 60 capsule, Rfl: 2    rosuvastatin (CRESTOR) 10 MG tablet, , Disp: , Rfl:     traMADol  "(ULTRAM) 50 MG tablet, Take 1-2 tablets by mouth Every 8 (Eight) Hours As Needed for Moderate Pain., Disp: 90 tablet, Rfl: 1    traZODone (DESYREL) 100 MG tablet, Take 0.5 tablets by mouth every night at bedtime. Indications: Anxiety Disorder, Trouble Sleeping, Disp: , Rfl:     vitamin D3 125 MCG (5000 UT) capsule capsule, Take 1 capsule by mouth Daily. Indications: Supplement, Disp: , Rfl:     Niva Thyroid 30 MG tablet, TAKE ONE TABLET BY MOUTH EVERY DAY, Disp: 30 tablet, Rfl: 1    nystatin (MYCOSTATIN) [id number] UNIT/GM powder, Apply  topically to the appropriate area as directed 2 (Two) Times a Day., Disp: 60 g, Rfl: 1    Allergies:   No Known Allergies    Objective     Physical Exam:  Vital Signs:   Vitals:    07/02/25 1047   BP: 122/76   BP Location: Right arm   Patient Position: Sitting   Cuff Size: Adult   Pulse: 110   Resp: 14   Temp: 98.9 °F (37.2 °C)   TempSrc: Temporal   SpO2: 98%   Weight: 98.6 kg (217 lb 6.4 oz)   Height: 162.6 cm (64.02\")   PainSc: 4    PainLoc: Leg  Comment: bilateral     Body mass index is 37.3 kg/m².    Physical Exam  Vitals and nursing note reviewed.   Constitutional:       General: She is not in acute distress.     Appearance: Normal appearance.   HENT:      Head: Normocephalic.      Nose: Nose normal.      Mouth/Throat:      Mouth: Mucous membranes are moist.      Pharynx: Oropharynx is clear.   Eyes:      General: Lids are normal.      Extraocular Movements: Extraocular movements intact.      Conjunctiva/sclera: Conjunctivae normal.      Pupils: Pupils are equal, round, and reactive to light.   Musculoskeletal:      Cervical back: Normal range of motion and neck supple.   Skin:     General: Skin is warm and dry.      Capillary Refill: Capillary refill takes less than 2 seconds.   Neurological:      Mental Status: She is oriented to person, place, and time.      Coordination: Romberg sign positive.      Gait: Gait abnormal.   Psychiatric:         Mood and Affect: Mood normal.        "  Behavior: Behavior normal.         Neurological Exam  Mental Status  Awake, alert and oriented to person, place and time. Oriented to person, place, and time.    Cranial Nerves  CN II: Visual acuity is normal. Visual fields full to confrontation.  CN III, IV, VI: Extraocular movements intact bilaterally. Normal lids and orbits bilaterally. Pupils equal round and reactive to light bilaterally.  CN V: Facial sensation is normal.  CN VII: Full and symmetric facial movement.  CN IX, X: Palate elevates symmetrically. Normal gag reflex.  CN XI: Shoulder shrug strength is normal.  CN XII: Tongue midline without atrophy or fasciculations.    Gait   Abnormal gait.Casual gait: Narrow stance. Reduced stride length. Romberg is present. Able to rise from chair without using arms.  Rollator .       Assessment / Plan      Assessment/Plan:   Diagnoses and all orders for this visit:    1. Subjective memory complaints (Primary)  -     ATN Profile; Future    2. SUPA (obstructive sleep apnea)         Follow Up:   Return in about 1 year (around 7/2/2026), or if symptoms worsen or fail to improve.    Anticipatory Guidance and Safety Reviewed  Patient Education Provided  MMSE 30/30  Labs: ATN profile per pt request  Discussed referral to board certified epileptologist for EMU and she would like to hold as she does not feel she is having seizure activity and EEG was normal   Discussed start of Aricept or Namenda therapy for prevention and she would like to consider; risks and benefits reviewed  Keep FU with Pulmonary for SUPA mgmt     RTC PRN Or within 1 year or sooner with issues    Madison Barger, DNP, APRN, FNP-C  [facility]

## 2025-07-03 ENCOUNTER — OFFICE VISIT (OUTPATIENT)
Dept: ONCOLOGY | Facility: CLINIC | Age: 75
End: 2025-07-03
Payer: MEDICARE

## 2025-07-03 ENCOUNTER — LAB (OUTPATIENT)
Dept: LAB | Facility: HOSPITAL | Age: 75
End: 2025-07-03
Payer: MEDICARE

## 2025-07-03 VITALS
TEMPERATURE: 96.8 F | DIASTOLIC BLOOD PRESSURE: 96 MMHG | OXYGEN SATURATION: 92 % | RESPIRATION RATE: 16 BRPM | HEART RATE: 95 BPM | WEIGHT: 215 LBS | BODY MASS INDEX: 36.7 KG/M2 | HEIGHT: 64 IN | SYSTOLIC BLOOD PRESSURE: 149 MMHG

## 2025-07-03 DIAGNOSIS — D47.2 MGUS (MONOCLONAL GAMMOPATHY OF UNKNOWN SIGNIFICANCE): Primary | ICD-10-CM

## 2025-07-03 DIAGNOSIS — D47.2 MGUS (MONOCLONAL GAMMOPATHY OF UNKNOWN SIGNIFICANCE): ICD-10-CM

## 2025-07-03 LAB
ALBUMIN SERPL-MCNC: 4.3 G/DL (ref 3.5–5.2)
ALBUMIN/GLOB SERPL: 1.3 G/DL
ALP SERPL-CCNC: 59 U/L (ref 39–117)
ALT SERPL W P-5'-P-CCNC: 15 U/L (ref 1–33)
ANION GAP SERPL CALCULATED.3IONS-SCNC: 13.7 MMOL/L (ref 5–15)
AST SERPL-CCNC: 29 U/L (ref 1–32)
B2 MICROGLOB SERPL-MCNC: 3.5 MG/L (ref 0.8–2.2)
BASOPHILS # BLD AUTO: 0.03 10*3/MM3 (ref 0–0.2)
BASOPHILS NFR BLD AUTO: 0.6 % (ref 0–1.5)
BILIRUB SERPL-MCNC: 0.2 MG/DL (ref 0–1.2)
BUN SERPL-MCNC: 25 MG/DL (ref 8–23)
BUN/CREAT SERPL: 22.1 (ref 7–25)
CALCIUM SPEC-SCNC: 10.1 MG/DL (ref 8.6–10.5)
CHLORIDE SERPL-SCNC: 96 MMOL/L (ref 98–107)
CO2 SERPL-SCNC: 20.3 MMOL/L (ref 22–29)
CREAT SERPL-MCNC: 1.13 MG/DL (ref 0.57–1)
DEPRECATED RDW RBC AUTO: 44.2 FL (ref 37–54)
EGFRCR SERPLBLD CKD-EPI 2021: 50.2 ML/MIN/1.73
EOSINOPHIL # BLD AUTO: 0.09 10*3/MM3 (ref 0–0.4)
EOSINOPHIL NFR BLD AUTO: 1.8 % (ref 0.3–6.2)
ERYTHROCYTE [DISTWIDTH] IN BLOOD BY AUTOMATED COUNT: 13.4 % (ref 12.3–15.4)
GLOBULIN UR ELPH-MCNC: 3.2 GM/DL
GLUCOSE SERPL-MCNC: 235 MG/DL (ref 65–99)
HCT VFR BLD AUTO: 38.1 % (ref 34–46.6)
HGB BLD-MCNC: 12.3 G/DL (ref 12–15.9)
IMM GRANULOCYTES # BLD AUTO: 0.02 10*3/MM3 (ref 0–0.05)
IMM GRANULOCYTES NFR BLD AUTO: 0.4 % (ref 0–0.5)
LYMPHOCYTES # BLD AUTO: 2.66 10*3/MM3 (ref 0.7–3.1)
LYMPHOCYTES NFR BLD AUTO: 53.2 % (ref 19.6–45.3)
MCH RBC QN AUTO: 29.1 PG (ref 26.6–33)
MCHC RBC AUTO-ENTMCNC: 32.3 G/DL (ref 31.5–35.7)
MCV RBC AUTO: 90.1 FL (ref 79–97)
MONOCYTES # BLD AUTO: 0.34 10*3/MM3 (ref 0.1–0.9)
MONOCYTES NFR BLD AUTO: 6.8 % (ref 5–12)
NEUTROPHILS NFR BLD AUTO: 1.86 10*3/MM3 (ref 1.7–7)
NEUTROPHILS NFR BLD AUTO: 37.2 % (ref 42.7–76)
NRBC BLD AUTO-RTO: 0 /100 WBC (ref 0–0.2)
PLATELET # BLD AUTO: 247 10*3/MM3 (ref 140–450)
PMV BLD AUTO: 9 FL (ref 6–12)
POTASSIUM SERPL-SCNC: 4.4 MMOL/L (ref 3.5–5.2)
PROT SERPL-MCNC: 7.5 G/DL (ref 6–8.5)
RBC # BLD AUTO: 4.23 10*6/MM3 (ref 3.77–5.28)
SODIUM SERPL-SCNC: 130 MMOL/L (ref 136–145)
WBC NRBC COR # BLD AUTO: 5 10*3/MM3 (ref 3.4–10.8)

## 2025-07-03 PROCEDURE — 80053 COMPREHEN METABOLIC PANEL: CPT

## 2025-07-03 PROCEDURE — 82784 ASSAY IGA/IGD/IGG/IGM EACH: CPT

## 2025-07-03 PROCEDURE — 82232 ASSAY OF BETA-2 PROTEIN: CPT

## 2025-07-03 PROCEDURE — 86334 IMMUNOFIX E-PHORESIS SERUM: CPT

## 2025-07-03 PROCEDURE — 84165 PROTEIN E-PHORESIS SERUM: CPT

## 2025-07-03 PROCEDURE — 85025 COMPLETE CBC W/AUTO DIFF WBC: CPT

## 2025-07-03 PROCEDURE — 83521 IG LIGHT CHAINS FREE EACH: CPT

## 2025-07-03 NOTE — PROGRESS NOTES
Follow Up Office Visit      Date: 2025     Patient Name: Harley Rodriges  MRN: [id number]  : 1950  Referring Physician: Bam Germna     Chief Complaint:  Follow-up for MGUS     History of Present Illness: Harley Rodriges is a pleasant 72 y.o. female with a past medical history of fibromyalgia, Lyme disease, iron deficiency, type 2 diabetes, hypertension, hypothyroidism who presents today for evaluation of anemia. The patient is accompanied by their friend who contributes to the history of their care.  Patient has been followed by the PCP as well as GI where she had labs checked last month which were notable for mild anemia with a hemoglobin of 11.3.  Iron studies were within normal limits and vitamin B12 was slightly elevated.  Per patient, she takes ferrous sulfate 3 times daily.  Denies any abdominal pain or discomfort.  Does note some dark stools related to the medication.  She has noted over the past 3 months that she is having significantly worsening fatigue, tiredness, and dizzy spells which appear worse when getting up to ambulate.  She has had to cut back on work and has had diminished quality of life as a result of this.     Interval History:  Presents to clinic for follow-up.  No major issues today.  Denies any new bone pain or discomfort    Oncology History:    Oncology/Hematology History    No history exists.       Subjective      Review of Systems:   Constitutional: Negative for fevers, chills, or weight loss  Eyes: Negative for blurred vision or discharge         Ear/Nose/Throat: Negative for difficulty swallowing, sore throat, LAD                                                       Respiratory: Negative for cough, SOA, wheezing                                                                                        Cardiovascular: Negative for chest pain or palpitations                                                                  Gastrointestinal: Negative for nausea, vomiting or  diarrhea                                                                     Genitourinary: Negative for dysuria or hematuria                                                                                           Musculoskeletal: Negative for any joint pains or muscle aches                                                                        Neurologic: Negative for any weakness, headaches, dizziness                                                                         Hematologic: Negative for any easy bleeding or bruising                                                                                   Psychiatric: Negative for anxiety or depression                          Past Medical History/Past Surgical History/ Family History/ Social History: Reviewed by me and unchanged from my previous documentation done on December 2024.     Medications:     Current Outpatient Medications:     acetaminophen (TYLENOL) 500 MG tablet, Take 1 tablet by mouth Every 6 (Six) Hours As Needed for Mild Pain. Indications: Pain, Disp: , Rfl:     Alcohol Swabs (Alcohol Prep) 70 % pads, USE 2 ALCOHOL PADS PER DAY WHEN ADMINISTERING INSULIN OR TESTING BLOOD SUGAR *NEW PRESCRIPTION REQUEST*, Disp: 200 each, Rfl: 01    apixaban (Eliquis) 5 MG tablet tablet, TAKE ONE (1) TABLET BY MOUTH TWICE DAILY *NEW PRESCRIPTION REQUEST*, Disp: 180 tablet, Rfl: 1    celecoxib (CeleBREX) 200 MG capsule, Take 1 capsule by mouth Daily., Disp: , Rfl:     cyclobenzaprine (FLEXERIL) 10 MG tablet, TAKE 1 TABLET BY MOUTH EVERY DAY *NEW PRESCRIPTION REQUEST*, Disp: 90 tablet, Rfl: 1    dicyclomine (BENTYL) 20 MG tablet, Take 1 tablet by mouth 3 (Three) Times a Day As Needed (lower abdominal pain and diarrhea). (Patient taking differently: Take 1 tablet by mouth As Needed (lower abdominal pain and diarrhea).), Disp: 30 tablet, Rfl: 1    DULoxetine (CYMBALTA) 60 MG capsule, TAKE ONE (1) CAPSULE BY MOUTH TWICE DAILY *NEW PRESCRIPTION REQUEST*, Disp: 180  capsule, Rfl: 1    estradiol (ESTRACE) 0.5 MG tablet, TAKE ONE TABLET BY MOUTH EVERY DAY, Disp: 90 tablet, Rfl: 2    glucose blood (Accu-Chek Guide Test) test strip, USE TO TEST BLOOD SUGAR TWICE DAILY *NEW PRESCRIPTION REQUEST*, Disp: 200 each, Rfl: 1    glucose monitor monitoring kit, Check glucose twice daily, Disp: 1 each, Rfl: 0    Jardiance 10 MG tablet tablet, TAKE ONE TABLET BY MOUTH EVERY DAY, Disp: 30 tablet, Rfl: 0    Lancets 30G misc, Check glucose twice daily, Disp: 100 each, Rfl: 5    Levoxyl 50 MCG tablet, Take 1 tablet by mouth Every Morning., Disp: 30 tablet, Rfl: 2    loratadine (CLARITIN) 10 MG tablet, TAKE 1 TABLET BY MOUTH EVERY DAY *NEW PRESCRIPTION REQUEST*, Disp: 90 tablet, Rfl: 1    meclizine (ANTIVERT) 25 MG tablet, TAKE 1 TABLET BY MOUTH EVERY 8 HOURS AS NEEDED *NEW PRESCRIPTION REQUEST*, Disp: 90 tablet, Rfl: 1    metoprolol succinate XL (TOPROL-XL) 25 MG 24 hr tablet, Take 1 tablet by mouth Daily., Disp: 90 tablet, Rfl: 3    Multiple Vitamins-Minerals (ONE-A-DAY WOMENS 50+ PO), Take  by mouth. Indications: Supplement, Disp: , Rfl:     nystatin (MYCOSTATIN) 463323 UNIT/GM cream, APPLY TO AFFECTED AREA(S) TOPICALLY TWICE DAILY AS DIRECTED, Disp: 30 g, Rfl: 11    omeprazole (priLOSEC) 40 MG capsule, TAKE ONE (1) CAPSULE BY MOUTH ONCE DAILY *NEW PRESCRIPTION REQUEST*, Disp: 90 capsule, Rfl: 1    pregabalin (Lyrica) 100 MG capsule, Take 1 capsule by mouth 2 (Two) Times a Day., Disp: 60 capsule, Rfl: 2    rosuvastatin (CRESTOR) 10 MG tablet, , Disp: , Rfl:     traMADol (ULTRAM) 50 MG tablet, Take 1-2 tablets by mouth Every 8 (Eight) Hours As Needed for Moderate Pain., Disp: 90 tablet, Rfl: 1    traZODone (DESYREL) 100 MG tablet, Take 0.5 tablets by mouth every night at bedtime. Indications: Anxiety Disorder, Trouble Sleeping, Disp: , Rfl:     vitamin D3 125 MCG (5000 UT) capsule capsule, Take 1 capsule by mouth Daily. Indications: Supplement, Disp: , Rfl:     Niva Thyroid 30 MG tablet, TAKE  "ONE TABLET BY MOUTH EVERY DAY, Disp: 30 tablet, Rfl: 1    nystatin (MYCOSTATIN) 485695 UNIT/GM powder, Apply  topically to the appropriate area as directed 2 (Two) Times a Day., Disp: 60 g, Rfl: 1    Allergies:   No Known Allergies    Objective     Physical Exam:  Vital Signs:   Vitals:    07/03/25 1016   BP: 149/96   Pulse: 95   Resp: 16   Temp: 96.8 °F (36 °C)   TempSrc: Infrared   SpO2: 92%   Weight: 97.5 kg (215 lb)   Height: 162.6 cm (64.02\")   PainSc: 5      Pain Score    07/03/25 1016   PainSc: 5      ECOG Performance Status: 1 - Symptomatic but completely ambulatory    Constitutional: NAD, ECOG 1  Eyes: PERRLA, scleral anicteric  ENT: No LAD, no thyromegaly  Respiratory: CTAB, no wheezing, rales, rhonchi  Cardiovascular: RRR, no murmurs, pulses 2+ bilaterally  Abdomen: soft, NT/ND, no HSM  Musculoskeletal: strength 5/5 bilaterally, no c/c/e  Neurologic: A&O x 3, CN II-XII intact grossly    Results Review:   No visits with results within 2 Week(s) from this visit.   Latest known visit with results is:   Office Visit on 05/27/2025   Component Date Value Ref Range Status    Color, UA 05/27/2025 Yellow  Yellow, Straw Final    Appearance, UA 05/27/2025 Clear  Clear Final    pH, UA 05/27/2025 7.0  5.0 - 8.0 Final    Specific Gravity, UA 05/27/2025 >1.030 (H)  1.005 - 1.030 Final    Glucose, UA 05/27/2025 >=1000 mg/dL (3+) (A)  Negative Final    Ketones, UA 05/27/2025 Negative  Negative Final    Bilirubin, UA 05/27/2025 Negative  Negative Final    Blood, UA 05/27/2025 Negative  Negative Final    Protein, UA 05/27/2025 30 mg/dL (1+) (A)  Negative Final    Leuk Esterase, UA 05/27/2025 Negative  Negative Final    Nitrite, UA 05/27/2025 Negative  Negative Final    Urobilinogen, UA 05/27/2025 0.2 E.U./dL  0.2 - 1.0 E.U./dL Final    Color 05/27/2025 Yellow  Yellow, Straw, Dark Yellow, Alba Final    Clarity, UA 05/27/2025 Clear  Clear Final    Specific Gravity  05/27/2025 1.010  1.005 - 1.030 Final    pH, Urine " 05/27/2025 6.5  5.0 - 8.0 Final    Leukocytes 05/27/2025 Negative  Negative Final    Nitrite, UA 05/27/2025 Negative  Negative Final    Protein, POC 05/27/2025 Negative  Negative mg/dL Final    Glucose, UA 05/27/2025 3+ (A)  Negative mg/dL Final    Ketones, UA 05/27/2025 Negative  Negative Final    Urobilinogen, UA 05/27/2025 Normal  Normal, 0.2 E.U./dL Final    Bilirubin 05/27/2025 Negative  Negative Final    Blood, UA 05/27/2025 Negative  Negative Final    Lot Number 05/27/2025 [id number]   Final    Expiration Date 05/27/2025 07/08/2026   Final    Extra Tube 05/27/2025 Hold for add-ons.   Final    Auto resulted.    RBC, UA 05/27/2025 0-2  None Seen, 0-2 /HPF Final    WBC, UA 05/27/2025 None Seen  None Seen, 0-2 /HPF Final    Bacteria, UA 05/27/2025 None Seen  None Seen /HPF Final    Squamous Epithelial Cells, UA 05/27/2025 7-12 (A)  None Seen, 0-2 /HPF Final    Hyaline Casts, UA 05/27/2025 None Seen  None Seen /LPF Final    Methodology 05/27/2025 Automated Microscopy   Final       No results found.    Assessment / Plan      Assessment/Plan:   1. MGUS (monoclonal gammopathy of unknown significance) (Primary)  -Work-up for anemia notable for an M spike of 0.7 with an IgG kappa light chain component in May 2023  -Kappa/lambda light chain ratio within normal limits, beta-2 microglobulin mildly elevated in May 2023  -Discussed the diagnosis which represents a 1% chance additive per year increase of developing into multiple myeloma  -Myeloma studies stable in December 2025  -Plan to recheck myeloma studies today  -Will consider bone marrow biopsy and PET/CT based off results  -Plan for repeat myeloma studies in 6 months         Follow Up:   Follow-up in 6 months     Elmer Pacheco MD  Hematology and Oncology     Please note that portions of this note may have been completed with a voice recognition program. Efforts were made to edit the dictations, but occasionally words are mistranscribed.

## 2025-07-07 ENCOUNTER — TELEPHONE (OUTPATIENT)
Dept: FAMILY MEDICINE CLINIC | Facility: CLINIC | Age: 75
End: 2025-07-07

## 2025-07-07 ENCOUNTER — PATIENT OUTREACH (OUTPATIENT)
Age: 75
End: 2025-07-07
Payer: MEDICARE

## 2025-07-07 DIAGNOSIS — E11.40 TYPE 2 DIABETES MELLITUS WITH DIABETIC NEUROPATHY, WITHOUT LONG-TERM CURRENT USE OF INSULIN: ICD-10-CM

## 2025-07-07 LAB
ALBUMIN SERPL ELPH-MCNC: 4 G/DL (ref 2.9–4.4)
ALBUMIN/GLOB SERPL: 1.3 {RATIO} (ref 0.7–1.7)
ALPHA1 GLOB SERPL ELPH-MCNC: 0.2 G/DL (ref 0–0.4)
ALPHA2 GLOB SERPL ELPH-MCNC: 0.8 G/DL (ref 0.4–1)
B-GLOBULIN SERPL ELPH-MCNC: 0.9 G/DL (ref 0.7–1.3)
GAMMA GLOB SERPL ELPH-MCNC: 1.4 G/DL (ref 0.4–1.8)
GLOBULIN SER-MCNC: 3.3 G/DL (ref 2.2–3.9)
IGA SERPL-MCNC: 37 MG/DL (ref 64–422)
IGG SERPL-MCNC: 1493 MG/DL (ref 586–1602)
IGM SERPL-MCNC: 67 MG/DL (ref 26–217)
INTERPRETATION SERPL IEP-IMP: ABNORMAL
KAPPA LC FREE SER-MCNC: 59.6 MG/L (ref 3.3–19.4)
KAPPA LC FREE/LAMBDA FREE SER: 3.43 {RATIO} (ref 0.26–1.65)
LABORATORY COMMENT REPORT: ABNORMAL
LAMBDA LC FREE SERPL-MCNC: 17.4 MG/L (ref 5.7–26.3)
M PROTEIN SERPL ELPH-MCNC: 0.9 G/DL
PROT SERPL-MCNC: 7.3 G/DL (ref 6–8.5)

## 2025-07-07 NOTE — TELEPHONE ENCOUNTER
Caller: Harley Rodriges    Relationship: Self    Best call back number: [phone]     Requested Prescriptions:   Requested Prescriptions     Pending Prescriptions Disp Refills    empagliflozin (Jardiance) 10 MG tablet tablet 30 tablet 0     Sig: Take 1 tablet by mouth Daily.        Pharmacy where request should be sent: [address] [phone] [facility] [phone]      Last office visit with prescribing clinician: 5/30/2025   Last telemedicine visit with prescribing clinician: Visit date not found   Next office visit with prescribing clinician: 7/30/2025     Additional details provided by patient: OUT OF MEDICATION     Does the patient have less than a 3 day supply:  [x] Yes  [] No    Would you like a call back once the refill request has been completed: [] Yes [x] No    If the office needs to give you a call back, can they leave a voicemail: [] Yes [x] No    Luther Villagomez   07/07/25 14:23 EDT

## 2025-07-07 NOTE — OUTREACH NOTE
Patient Outreach    SW returned pt call. Pt stated she has lost access to her email and My Chart presently. SW offered to give pt web address for Extra Help application, but pt declined. Pt is agreeable to call SW at another date when she is ready to take down the information.     Jose COURTNEY -   Ambulatory Case Management    7/7/2025, 13:14 EDT

## 2025-07-08 ENCOUNTER — OFFICE VISIT (OUTPATIENT)
Dept: FAMILY MEDICINE CLINIC | Facility: CLINIC | Age: 75
End: 2025-07-08
Payer: MEDICARE

## 2025-07-08 VITALS
SYSTOLIC BLOOD PRESSURE: 122 MMHG | DIASTOLIC BLOOD PRESSURE: 82 MMHG | HEART RATE: 74 BPM | HEIGHT: 64 IN | RESPIRATION RATE: 18 BRPM | WEIGHT: 217.4 LBS | OXYGEN SATURATION: 94 % | BODY MASS INDEX: 37.11 KG/M2

## 2025-07-08 DIAGNOSIS — L30.9 DERMATITIS: Primary | ICD-10-CM

## 2025-07-08 LAB
A -- BETA-AMYLOID 42/40 RATIO: 0.11
AL BETA40 PLAS-MCNC: 232.43 PG/ML
AL BETA42 PLAS-MCNC: 25.47 PG/ML
ATN SUMMARY1: ABNORMAL
INFORMATION:: ABNORMAL
NFL CHAIN SERPL IA-MCNC: 3.09 PG/ML (ref 0–6.04)
P-TAU181 PLAS IA-MCNC: 1.02 PG/ML (ref 0–0.97)

## 2025-07-08 PROCEDURE — 1159F MED LIST DOCD IN RCRD: CPT | Performed by: NURSE PRACTITIONER

## 2025-07-08 PROCEDURE — 1125F AMNT PAIN NOTED PAIN PRSNT: CPT | Performed by: NURSE PRACTITIONER

## 2025-07-08 PROCEDURE — 1160F RVW MEDS BY RX/DR IN RCRD: CPT | Performed by: NURSE PRACTITIONER

## 2025-07-08 PROCEDURE — 99213 OFFICE O/P EST LOW 20 MIN: CPT | Performed by: NURSE PRACTITIONER

## 2025-07-08 RX ORDER — FLUOCINONIDE 0.5 MG/G
1 OINTMENT TOPICAL 2 TIMES DAILY
Qty: 15 G | Refills: 0 | Status: SHIPPED | OUTPATIENT
Start: 2025-07-08

## 2025-07-08 NOTE — PROGRESS NOTES
Subjective     Chief Complaint:    Chief Complaint   Patient presents with    Blister     Pt blistered from adhesive from heart monitor.        History of Present Illness  75-year-old female with rash. Started 30-day heart monitor on 06/24/2025, removed on 07/03/2025 due to confusion. Rash at adhesive site, not itchy or burning. Hesitant to reapply adhesive for 20 days. Electrophysiologist's office sending different adhesive. Applied triple antibiotic ointment,        Review of Systems  Gen- No fevers, chills  CV- No chest pain, palpitations  Resp- No cough, dyspnea  GI- No N/V/D, abd pain  Neuro-No dizziness, headaches      I have reviewed and/or updated the patient's past medical, surgical, family, social history and problem list as appropriate.     Medications:    Current Outpatient Medications:     acetaminophen (TYLENOL) 500 MG tablet, Take 1 tablet by mouth Every 6 (Six) Hours As Needed for Mild Pain. Indications: Pain, Disp: , Rfl:     Alcohol Swabs (Alcohol Prep) 70 % pads, USE 2 ALCOHOL PADS PER DAY WHEN ADMINISTERING INSULIN OR TESTING BLOOD SUGAR *NEW PRESCRIPTION REQUEST*, Disp: 200 each, Rfl: 01    apixaban (Eliquis) 5 MG tablet tablet, TAKE ONE (1) TABLET BY MOUTH TWICE DAILY *NEW PRESCRIPTION REQUEST*, Disp: 180 tablet, Rfl: 1    celecoxib (CeleBREX) 200 MG capsule, Take 1 capsule by mouth Daily., Disp: , Rfl:     cyclobenzaprine (FLEXERIL) 10 MG tablet, TAKE 1 TABLET BY MOUTH EVERY DAY *NEW PRESCRIPTION REQUEST*, Disp: 90 tablet, Rfl: 1    dicyclomine (BENTYL) 20 MG tablet, Take 1 tablet by mouth 3 (Three) Times a Day As Needed (lower abdominal pain and diarrhea). (Patient taking differently: Take 1 tablet by mouth As Needed (lower abdominal pain and diarrhea).), Disp: 30 tablet, Rfl: 1    DULoxetine (CYMBALTA) 60 MG capsule, TAKE ONE (1) CAPSULE BY MOUTH TWICE DAILY *NEW PRESCRIPTION REQUEST*, Disp: 180 capsule, Rfl: 1    empagliflozin (Jardiance) 10 MG tablet tablet, Take 1 tablet by mouth  ----- Message from Roxana Sue MD sent at 2/26/2019  3:52 PM CST -----  Please inform patient that labs are within normal limit.   Daily., Disp: 30 tablet, Rfl: 0    estradiol (ESTRACE) 0.5 MG tablet, TAKE ONE TABLET BY MOUTH EVERY DAY, Disp: 90 tablet, Rfl: 2    glucose blood (Accu-Chek Guide Test) test strip, USE TO TEST BLOOD SUGAR TWICE DAILY *NEW PRESCRIPTION REQUEST*, Disp: 200 each, Rfl: 1    glucose monitor monitoring kit, Check glucose twice daily, Disp: 1 each, Rfl: 0    Lancets 30G misc, Check glucose twice daily, Disp: 100 each, Rfl: 5    Levoxyl 50 MCG tablet, Take 1 tablet by mouth Every Morning., Disp: 30 tablet, Rfl: 2    loratadine (CLARITIN) 10 MG tablet, TAKE 1 TABLET BY MOUTH EVERY DAY *NEW PRESCRIPTION REQUEST*, Disp: 90 tablet, Rfl: 1    meclizine (ANTIVERT) 25 MG tablet, TAKE 1 TABLET BY MOUTH EVERY 8 HOURS AS NEEDED *NEW PRESCRIPTION REQUEST*, Disp: 90 tablet, Rfl: 1    metoprolol succinate XL (TOPROL-XL) 25 MG 24 hr tablet, Take 1 tablet by mouth Daily., Disp: 90 tablet, Rfl: 3    Multiple Vitamins-Minerals (ONE-A-DAY WOMENS 50+ PO), Take  by mouth. Indications: Supplement, Disp: , Rfl:     nystatin (MYCOSTATIN) 734094 UNIT/GM cream, APPLY TO AFFECTED AREA(S) TOPICALLY TWICE DAILY AS DIRECTED, Disp: 30 g, Rfl: 11    omeprazole (priLOSEC) 40 MG capsule, TAKE ONE (1) CAPSULE BY MOUTH ONCE DAILY *NEW PRESCRIPTION REQUEST*, Disp: 90 capsule, Rfl: 1    pregabalin (Lyrica) 100 MG capsule, Take 1 capsule by mouth 2 (Two) Times a Day., Disp: 60 capsule, Rfl: 2    rosuvastatin (CRESTOR) 10 MG tablet, , Disp: , Rfl:     traMADol (ULTRAM) 50 MG tablet, Take 1-2 tablets by mouth Every 8 (Eight) Hours As Needed for Moderate Pain., Disp: 90 tablet, Rfl: 1    traZODone (DESYREL) 100 MG tablet, Take 0.5 tablets by mouth every night at bedtime. Indications: Anxiety Disorder, Trouble Sleeping, Disp: , Rfl:     fluocinonide (LIDEX) 0.05 % ointment, Apply 1 Application topically to the appropriate area as directed 2 (Two) Times a Day., Disp: 15 g, Rfl: 0    Allergies:  No Known Allergies    Objective     Vital Signs:   Vitals:    07/08/25  "0824   BP: 122/82   Pulse: 74   Resp: 18   SpO2: 94%   Weight: 98.6 kg (217 lb 6.4 oz)   Height: 162.6 cm (64.02\")     Body mass index is 37.29 kg/m².    Physical Exam:    Physical Exam  HENT:      Head: Normocephalic and atraumatic.      Nose: Nose normal.   Eyes:      Pupils: Pupils are equal, round, and reactive to light.   Cardiovascular:      Rate and Rhythm: Normal rate.   Pulmonary:      Effort: Pulmonary effort is normal.   Skin:     Findings: Rash (erythematous raised rash noted on chest) present.   Neurological:      General: No focal deficit present.      Mental Status: She is alert and oriented to person, place, and time.   Psychiatric:         Mood and Affect: Mood normal.         Behavior: Behavior normal.                 Assessment / Plan     Assessment/Plan:   Problem List Items Addressed This Visit    None  Visit Diagnoses         Dermatitis    -  Primary    Relevant Medications    fluocinonide (LIDEX) 0.05 % ointment            Assessment & Plan  Allergic reaction to adhesive  - Physical exam consistent with allergic reaction  - Advised to contact electrophysiologist's office regarding rash and delay in reapplying monitor  - Prescribed topical corticosteroid 2-3 times daily initially, then twice daily; avoid Neosporin         Discussed plan of care in detail with pt today; pt verb understanding and agrees.    Follow up:  As needed    Electronically signed by AUSTIN Hickey   07/08/2025 10:41 EDT    Patient or patient representative verbalized consent for the use of Ambient Listening during the visit with  AUSTIN Hickey for chart documentation. 7/8/2025  10:43 EDT    Please note that portions of this note were completed with a voice recognition program.   "

## 2025-07-10 ENCOUNTER — HOSPITAL ENCOUNTER (OUTPATIENT)
Dept: ULTRASOUND IMAGING | Facility: HOSPITAL | Age: 75
Discharge: HOME | End: 2025-07-10
Admitting: NURSE PRACTITIONER
Payer: MEDICARE

## 2025-07-10 DIAGNOSIS — R55 SYNCOPE, UNSPECIFIED SYNCOPE TYPE: ICD-10-CM

## 2025-07-10 PROCEDURE — 93880 EXTRACRANIAL BILAT STUDY: CPT

## 2025-07-18 ENCOUNTER — PATIENT OUTREACH (OUTPATIENT)
Age: 75
End: 2025-07-18
Payer: MEDICARE

## 2025-07-18 NOTE — OUTREACH NOTE
Patient Outreach    SW F/u with pt and offered to email her the Extra Help application. Per pt, she can open it on her phone. ANGELA will D/c due to initial outreach goal being met.    Jose COURTNEY -   Ambulatory Case Management    7/18/2025, 11:44 EDT

## 2025-07-21 ENCOUNTER — OFFICE VISIT (OUTPATIENT)
Dept: FAMILY MEDICINE CLINIC | Facility: CLINIC | Age: 75
End: 2025-07-21
Payer: MEDICARE

## 2025-07-21 VITALS
WEIGHT: 217.2 LBS | DIASTOLIC BLOOD PRESSURE: 84 MMHG | RESPIRATION RATE: 16 BRPM | TEMPERATURE: 98.2 F | HEART RATE: 83 BPM | BODY MASS INDEX: 37.08 KG/M2 | OXYGEN SATURATION: 95 % | HEIGHT: 64 IN | SYSTOLIC BLOOD PRESSURE: 124 MMHG

## 2025-07-21 DIAGNOSIS — G89.29 CHRONIC BILATERAL LOW BACK PAIN WITHOUT SCIATICA: ICD-10-CM

## 2025-07-21 DIAGNOSIS — M54.50 CHRONIC BILATERAL LOW BACK PAIN WITHOUT SCIATICA: ICD-10-CM

## 2025-07-21 DIAGNOSIS — E11.40 TYPE 2 DIABETES MELLITUS WITH DIABETIC NEUROPATHY, WITHOUT LONG-TERM CURRENT USE OF INSULIN: ICD-10-CM

## 2025-07-21 DIAGNOSIS — Z20.7 SCABIES EXPOSURE: Primary | ICD-10-CM

## 2025-07-21 PROCEDURE — 1159F MED LIST DOCD IN RCRD: CPT | Performed by: FAMILY MEDICINE

## 2025-07-21 PROCEDURE — 99213 OFFICE O/P EST LOW 20 MIN: CPT | Performed by: FAMILY MEDICINE

## 2025-07-21 PROCEDURE — 1160F RVW MEDS BY RX/DR IN RCRD: CPT | Performed by: FAMILY MEDICINE

## 2025-07-21 PROCEDURE — 1125F AMNT PAIN NOTED PAIN PRSNT: CPT | Performed by: FAMILY MEDICINE

## 2025-07-21 RX ORDER — TRAMADOL HYDROCHLORIDE 50 MG/1
50-100 TABLET ORAL EVERY 8 HOURS PRN
Qty: 90 TABLET | Refills: 1 | Status: CANCELLED | OUTPATIENT
Start: 2025-07-21

## 2025-07-21 RX ORDER — TRAMADOL HYDROCHLORIDE 50 MG/1
50-100 TABLET ORAL EVERY 8 HOURS PRN
Qty: 90 TABLET | Refills: 0 | Status: SHIPPED | OUTPATIENT
Start: 2025-07-21

## 2025-07-21 RX ORDER — PERMETHRIN 50 MG/G
1 CREAM TOPICAL ONCE
Qty: 30 G | Refills: 1 | Status: SHIPPED | OUTPATIENT
Start: 2025-07-21 | End: 2025-07-21

## 2025-07-21 NOTE — TELEPHONE ENCOUNTER
Rx Refill Note  Requested Prescriptions     Pending Prescriptions Disp Refills    traMADol (ULTRAM) 50 MG tablet 90 tablet 1     Sig: Take 1-2 tablets by mouth Every 8 (Eight) Hours As Needed for Moderate Pain.     Signed Prescriptions Disp Refills    empagliflozin (Jardiance) 10 MG tablet tablet 30 tablet 0     Sig: Take 1 tablet by mouth Daily.     Authorizing Provider: CHERYL AREVALO     Ordering User: JOSEF RONDON      Last office visit with prescribing clinician: 7/8/2025   Last telemedicine visit with prescribing clinician: Visit date not found   Next office visit with prescribing clinician: 7/30/2025                         Would you like a call back once the refill request has been completed: [] Yes [] No    If the office needs to give you a call back, can they leave a voicemail: [] Yes [] No    Josef Rondon MA  07/21/25, 11:18 EDT

## 2025-07-21 NOTE — TELEPHONE ENCOUNTER
Caller: Harley Rodriges    Relationship: Self    Best call back number: [phone]     Requested Prescriptions:   Requested Prescriptions     Pending Prescriptions Disp Refills    empagliflozin (Jardiance) 10 MG tablet tablet 30 tablet 0     Sig: Take 1 tablet by mouth Daily.    traMADol (ULTRAM) 50 MG tablet 90 tablet 1     Sig: Take 1-2 tablets by mouth Every 8 (Eight) Hours As Needed for Moderate Pain.        Pharmacy where request should be sent: [address] [phone] [facility] [phone]      Last office visit with prescribing clinician: 7/8/2025   Last telemedicine visit with prescribing clinician: Visit date not found   Next office visit with prescribing clinician: 7/30/2025     Additional details provided by patient: PATIENT IS COMPLETELY OUT OF TRAMADOL      Does the patient have less than a 3 day supply:  [x] Yes  [] No    Would you like a call back once the refill request has been completed: [] Yes [x] No    If the office needs to give you a call back, can they leave a voicemail: [] Yes [x] No    Luther Reid Rep   07/21/25 10:31 EDT

## 2025-07-21 NOTE — PROGRESS NOTES
"    Office Note     Name: Harley Rodriges  : 1950   MRN: [id number]     Chief Complaint:  exposure to scabies (Pt has been exposed to scabies. Her dog and neighbors dog has scabies and she wants to be examined to see if she has it as well. She has irritation from tape on heart monitor but she does not see any spots or itching but wants to be sure)    Subjective        History of Present Illness:  Harley Rodriges is a 75 y.o. female who presents today for exposure to scabies.    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 Her dog has recurrent mange, attributed to allergies. In 2025, a friend stayed in the same room and both she and her dog were diagnosed with scabies. She herself does not exhibit signs of scabies. Reports itching due to allergy to neem and a large bite yash, possibly from a spider. No red streaks on skin. Experiences itching on scalp and neck at night, possibly related to fibromyalgia. Washing belongings in hot water and drying them. Her dog, a shelter dog, treated for mange and loses hair during allergy episodes, resulting in crusty patches.      I have reviewed the following portions of the patient's history and these were updated and discussed with the patient as appropriate: past family history, past medical history, past social history, past surgical history, and problem list.     Objective     Vital Signs  /84   Pulse 83   Temp 98.2 °F (36.8 °C) (Oral)   Resp 16   Ht 162.6 cm (64.02\")   Wt 98.5 kg (217 lb 3.2 oz)   SpO2 95%   BMI 37.26 kg/m²   Estimated body mass index is 37.26 kg/m² as calculated from the following:    Height as of this encounter: 162.6 cm (64.02\").    Weight as of this encounter: 98.5 kg (217 lb 3.2 oz).    Physical Exam  Vitals reviewed.   Constitutional:       General: She is not in acute distress.     Appearance: Normal appearance. She is not ill-appearing.   HENT:      Head: " Normocephalic.   Eyes:      Extraocular Movements: Extraocular movements intact.   Cardiovascular:      Rate and Rhythm: Normal rate.   Pulmonary:      Effort: Pulmonary effort is normal.      Breath sounds: Normal breath sounds.   Skin:     General: Skin is warm and dry.      Comments: No sign of insect bites, rash, or any tiny red bumps or blisters   Neurological:      Mental Status: She is alert and oriented to person, place, and time.   Psychiatric:         Mood and Affect: Mood normal.         Behavior: Behavior normal.            Assessment and Plan     Diagnoses and all orders for this visit:    1. Scabies exposure (Primary)  -     permethrin (ELIMITE) 5 % cream; Apply 1 Application topically to the appropriate area as directed 1 (One) Time for 1 dose.  Dispense: 30 g; Refill: 1         Scabies exposure  - Possible scabies infestation, no definitive signs observed  - No visible evidence of scabies on exam; no red streaks or typical bite patterns  - Discussed thorough cleaning of living environment, including washing bedding and clothing in hot water and vacuuming or steam cleaning mattress  - Prescription for Elimite 30 g provided with instructions to apply all over body, leave on for 8 hours, then wash off; repeat after 2 weeks      Discussion Summary:     Discussed plan of care in detail with patient today. Patient was encouraged to keep me informed of any acute changes, lack of improvement, or any new concerning symptoms.  Patient is also aware of reasons to seek emergent care. Patient verbalized understanding and agrees with plan of care.    This visit was billed based on [facility].    Follow Up  No follow-ups on file.    Patient or patient representative verbalized consent for the use of Ambient Listening during the visit with  Luis Lemus MD for chart documentation. 7/21/2025  16:20 EDT    Please note that portions of this note may have been completed with a voice recognition program.     Luis Lemus MD,  THAO Wyatt

## 2025-07-21 NOTE — TELEPHONE ENCOUNTER
QING reviewed. Refill approved. Medication E rx'd to pharmacy as requested. Pt to keep f/u apt as scheduled.

## 2025-07-22 ENCOUNTER — OFFICE VISIT (OUTPATIENT)
Dept: PULMONOLOGY | Facility: CLINIC | Age: 75
End: 2025-07-22
Payer: MEDICARE

## 2025-07-22 VITALS
HEART RATE: 86 BPM | BODY MASS INDEX: 37.22 KG/M2 | DIASTOLIC BLOOD PRESSURE: 76 MMHG | OXYGEN SATURATION: 97 % | WEIGHT: 218 LBS | SYSTOLIC BLOOD PRESSURE: 128 MMHG | RESPIRATION RATE: 16 BRPM | HEIGHT: 64 IN

## 2025-07-22 DIAGNOSIS — G47.33 OBSTRUCTIVE SLEEP APNEA: Primary | ICD-10-CM

## 2025-07-22 DIAGNOSIS — E03.9 ACQUIRED HYPOTHYROIDISM: ICD-10-CM

## 2025-07-22 DIAGNOSIS — E66.812 OBESITY, CLASS II, BMI 35-39.9, ISOLATED (SEE ACTUAL BMI): ICD-10-CM

## 2025-07-22 PROCEDURE — 99214 OFFICE O/P EST MOD 30 MIN: CPT | Performed by: INTERNAL MEDICINE

## 2025-07-22 RX ORDER — METOPROLOL TARTRATE 25 MG/1
TABLET, FILM COATED ORAL
COMMUNITY
Start: 2025-07-22

## 2025-07-22 NOTE — PROGRESS NOTES
"  Chief Complaint   Patient presents with    Sleeping Problem    Follow-up       Subjective   Harley Rodriges is a 75 y.o. female.   Patient was evaluated today for follow up of Sleep apnea. Patient does report some initial improvement but now feels that the PAP device is not relieving some of the symptoms.    Patient reports slight worsening in daytime sleepiness when compared to before. Patient is not aware of snoring through the device.     she is complaining of issues with occasional excessive dryness.    Patient is also having occasional leaks with the mask which makes it hard for her to use the mask on a consistent basis.     Patient says that the compliance with the use of the equipment is good.       The following portions of the patient's history were reviewed and updated as appropriate: allergies, current medications, past family history, past medical history, past social history, and past surgical history.    Review of Systems   HENT:  Negative for sinus pressure, sneezing and sore throat.    Respiratory:  Negative for cough, chest tightness, shortness of breath and wheezing.        Objective   Visit Vitals  /76   Pulse 86   Resp 16   Ht 162.6 cm (64\") Comment: pt reported   Wt 98.9 kg (218 lb)   SpO2 97%   BMI 37.42 kg/m²       BMI Readings from Last 8 Encounters:   07/22/25 37.42 kg/m²   07/21/25 37.26 kg/m²   07/08/25 37.29 kg/m²   07/03/25 36.89 kg/m²   07/02/25 37.30 kg/m²   05/30/25 37.08 kg/m²   05/27/25 37.08 kg/m²   05/05/25 37.25 kg/m²       Physical Exam  Vitals reviewed.   Constitutional:       Appearance: She is well-developed.   HENT:      Head: Atraumatic.      Mouth/Throat:      Comments: Oropharynx was crowded.  Musculoskeletal:      Comments: Used a walker   Neurological:      Mental Status: She is alert and oriented to person, place, and time.           Assessment & Plan   Diagnoses and all orders for this visit:    1. Obstructive sleep apnea (Primary)  -     BIPAP / CPAP " Adjustment    2. Obesity, Class II, BMI 35-39.9, isolated (see actual BMI)           Return in about 7 months (around 2/22/2026) for Garrison/Julissa.    DISCUSSION (if any):  Sleep study performed in Dec 2024  AHI was 37 / hour.   Supine AHI was 46/hour.     Latest PAP device provided in Jan 2025  DME company: [employer]    Current PAP settings: 6-20  Current mask type: FFM    Compliance data will be obtained from the her device/DME company.    I told the patient that her symptoms are consistent with partially controlled sleep apnea.    I have decided to empirically change her to AutoPAP at a pressure of 9-18.    If the symptoms do not improve, even after undertaking the above measures, the patient may have to undergo a full night titration study.    Patient was advised to continue using PAP for at least 4 hours every night.    Patient was advised to call this office with any issues.    I spent a total of 31 minutes face to face with this patient. her pertinent current and previous data, as applicable, were reviewed with the patient. Patient's diagnostic studies were also reviewed, including previous sleep study. We also discussed sleep hygiene measures and measures to increase compliance with the CPAP device.  Time also includes documentation in the chart and reviewing data in the chart.     Dictated utilizing Dragon dictation.    This document was electronically signed by Mayte Christie MD on 07/22/25 at 14:39 EDT

## 2025-07-23 NOTE — TELEPHONE ENCOUNTER
Rx Refill Note  Requested Prescriptions     Pending Prescriptions Disp Refills    Levoxyl 50 MCG tablet [Pharmacy Med Name: LEVOXYL 50 MCG TABLET 50 Tablet] 30 tablet 11     Sig: TAKE 1 TABLET BY MOUTH EVERY MORNING.      Last office visit with prescribing clinician: 5/27/2025     Next office visit with prescribing clinician: 10/14/2025     }    Elif Vera MA  07/23/25, 10:44 EDT

## 2025-07-24 DIAGNOSIS — E11.65 TYPE 2 DIABETES MELLITUS WITH HYPERGLYCEMIA, WITHOUT LONG-TERM CURRENT USE OF INSULIN: ICD-10-CM

## 2025-07-24 DIAGNOSIS — E03.9 ACQUIRED HYPOTHYROIDISM: Primary | ICD-10-CM

## 2025-07-25 RX ORDER — LEVOTHYROXINE SODIUM 50 UG/1
50 TABLET ORAL
Qty: 30 TABLET | Refills: 2 | Status: SHIPPED | OUTPATIENT
Start: 2025-07-25

## 2025-08-01 DIAGNOSIS — E03.9 ACQUIRED HYPOTHYROIDISM: Primary | ICD-10-CM

## 2025-08-11 ENCOUNTER — OFFICE VISIT (OUTPATIENT)
Dept: FAMILY MEDICINE CLINIC | Facility: CLINIC | Age: 75
End: 2025-08-11
Payer: MEDICARE

## 2025-08-11 VITALS
BODY MASS INDEX: 37.01 KG/M2 | SYSTOLIC BLOOD PRESSURE: 108 MMHG | DIASTOLIC BLOOD PRESSURE: 68 MMHG | HEART RATE: 74 BPM | HEIGHT: 64 IN | WEIGHT: 216.8 LBS | OXYGEN SATURATION: 94 %

## 2025-08-11 DIAGNOSIS — Z12.31 ENCOUNTER FOR SCREENING MAMMOGRAM FOR MALIGNANT NEOPLASM OF BREAST: ICD-10-CM

## 2025-08-11 DIAGNOSIS — G62.9 PERIPHERAL POLYNEUROPATHY: ICD-10-CM

## 2025-08-11 DIAGNOSIS — E11.65 TYPE 2 DIABETES MELLITUS WITH HYPERGLYCEMIA, WITHOUT LONG-TERM CURRENT USE OF INSULIN: ICD-10-CM

## 2025-08-11 DIAGNOSIS — M54.50 CHRONIC BILATERAL LOW BACK PAIN WITHOUT SCIATICA: ICD-10-CM

## 2025-08-11 DIAGNOSIS — G89.29 CHRONIC BILATERAL LOW BACK PAIN WITHOUT SCIATICA: ICD-10-CM

## 2025-08-11 DIAGNOSIS — E03.9 HYPOTHYROIDISM, UNSPECIFIED TYPE: ICD-10-CM

## 2025-08-11 DIAGNOSIS — E03.9 ACQUIRED HYPOTHYROIDISM: ICD-10-CM

## 2025-08-11 DIAGNOSIS — M79.7 FIBROMYALGIA: ICD-10-CM

## 2025-08-11 DIAGNOSIS — R53.83 FATIGUE, UNSPECIFIED TYPE: ICD-10-CM

## 2025-08-11 DIAGNOSIS — K21.00 GASTROESOPHAGEAL REFLUX DISEASE WITH ESOPHAGITIS WITHOUT HEMORRHAGE: ICD-10-CM

## 2025-08-11 DIAGNOSIS — E11.65 TYPE 2 DIABETES MELLITUS WITH HYPERGLYCEMIA, WITHOUT LONG-TERM CURRENT USE OF INSULIN: Primary | ICD-10-CM

## 2025-08-11 DIAGNOSIS — I48.0 PAROXYSMAL ATRIAL FIBRILLATION: ICD-10-CM

## 2025-08-11 LAB
EXPIRATION DATE: ABNORMAL
HBA1C MFR BLD: 8.5 % (ref 4.5–5.7)
Lab: ABNORMAL

## 2025-08-11 PROCEDURE — 1159F MED LIST DOCD IN RCRD: CPT | Performed by: NURSE PRACTITIONER

## 2025-08-11 PROCEDURE — 3052F HG A1C>EQUAL 8.0%<EQUAL 9.0%: CPT | Performed by: NURSE PRACTITIONER

## 2025-08-11 PROCEDURE — 1160F RVW MEDS BY RX/DR IN RCRD: CPT | Performed by: NURSE PRACTITIONER

## 2025-08-11 PROCEDURE — 99214 OFFICE O/P EST MOD 30 MIN: CPT | Performed by: NURSE PRACTITIONER

## 2025-08-11 PROCEDURE — 83036 HEMOGLOBIN GLYCOSYLATED A1C: CPT | Performed by: NURSE PRACTITIONER

## 2025-08-11 PROCEDURE — 1125F AMNT PAIN NOTED PAIN PRSNT: CPT | Performed by: NURSE PRACTITIONER

## 2025-08-13 ENCOUNTER — TELEPHONE (OUTPATIENT)
Dept: ENDOCRINOLOGY | Facility: CLINIC | Age: 75
End: 2025-08-13
Payer: MEDICARE

## 2025-08-14 DIAGNOSIS — G89.29 CHRONIC BILATERAL LOW BACK PAIN WITHOUT SCIATICA: ICD-10-CM

## 2025-08-14 DIAGNOSIS — G62.9 PERIPHERAL POLYNEUROPATHY: ICD-10-CM

## 2025-08-14 DIAGNOSIS — M54.50 CHRONIC BILATERAL LOW BACK PAIN WITHOUT SCIATICA: ICD-10-CM

## 2025-08-14 DIAGNOSIS — M79.7 FIBROMYALGIA: ICD-10-CM

## 2025-08-15 RX ORDER — PREGABALIN 100 MG/1
100 CAPSULE ORAL 2 TIMES DAILY
Qty: 60 CAPSULE | Refills: 3 | Status: SHIPPED | OUTPATIENT
Start: 2025-08-15

## (undated) DEVICE — FRCP BX RADJAW4 NDL 2.8 240 STD OG

## (undated) DEVICE — FRCP BIOP COLD ENDOJAW ALLGTR W/NDL 2.8X2300MM BLU

## (undated) DEVICE — LUBE JELLY PK/2.75GM STRL BX/144

## (undated) DEVICE — SUCTION CANISTER, 1500CC, RIGID: Brand: DEROYAL

## (undated) DEVICE — Device

## (undated) DEVICE — ENDOSCOPY PORT CONNECTOR FOR OLYMPUS® SCOPES: Brand: ERBE

## (undated) DEVICE — VLV SXN AIR/H2O ORCAPOD3 1P/U STRL

## (undated) DEVICE — HYBRID TUBING/CAP SET FOR OLYMPUS® SCOPES: Brand: ERBE

## (undated) DEVICE — PAD GRND REM POLYHESIVE A/ DISP

## (undated) DEVICE — QUICK CATCH IN-LINE SUCTION POLYP TRAP IS USED FOR SUCTION RETRIEVAL OF ENDOSCOPICALLY REMOVED POLYPS.

## (undated) DEVICE — CONMED SCOPE SAVER BITE BLOCK, 20X27 MM: Brand: SCOPE SAVER

## (undated) DEVICE — SNAR POLYP SENSATION STDOVL 27 240 BX40